# Patient Record
Sex: MALE | Race: WHITE | NOT HISPANIC OR LATINO | Employment: OTHER | URBAN - METROPOLITAN AREA
[De-identification: names, ages, dates, MRNs, and addresses within clinical notes are randomized per-mention and may not be internally consistent; named-entity substitution may affect disease eponyms.]

---

## 2017-10-03 ENCOUNTER — TRANSCRIBE ORDERS (OUTPATIENT)
Dept: CARDIOLOGY | Facility: CLINIC | Age: 67
End: 2017-10-03

## 2017-10-03 DIAGNOSIS — R94.39 ABNORMAL STRESS ECHO: Primary | ICD-10-CM

## 2017-10-05 ENCOUNTER — PREP FOR SURGERY (OUTPATIENT)
Dept: OTHER | Facility: HOSPITAL | Age: 67
End: 2017-10-05

## 2017-10-05 DIAGNOSIS — R94.39 ABNORMAL STRESS TEST: Primary | ICD-10-CM

## 2017-10-05 RX ORDER — ONDANSETRON 2 MG/ML
4 INJECTION INTRAMUSCULAR; INTRAVENOUS EVERY 6 HOURS PRN
Status: CANCELLED | OUTPATIENT
Start: 2017-10-05

## 2017-10-05 RX ORDER — ASPIRIN 325 MG
325 TABLET, DELAYED RELEASE (ENTERIC COATED) ORAL DAILY
Status: CANCELLED | OUTPATIENT
Start: 2017-10-06

## 2017-10-05 RX ORDER — SODIUM CHLORIDE 0.9 % (FLUSH) 0.9 %
1-10 SYRINGE (ML) INJECTION AS NEEDED
Status: CANCELLED | OUTPATIENT
Start: 2017-10-05

## 2017-10-05 RX ORDER — NITROGLYCERIN 0.4 MG/1
0.4 TABLET SUBLINGUAL
Status: CANCELLED | OUTPATIENT
Start: 2017-10-05

## 2017-10-05 RX ORDER — ASPIRIN 325 MG
325 TABLET ORAL ONCE
Status: CANCELLED | OUTPATIENT
Start: 2017-10-05 | End: 2017-10-05

## 2017-10-05 RX ORDER — ACETAMINOPHEN 325 MG/1
650 TABLET ORAL EVERY 4 HOURS PRN
Status: CANCELLED | OUTPATIENT
Start: 2017-10-05

## 2017-10-06 ENCOUNTER — HOSPITAL ENCOUNTER (OUTPATIENT)
Facility: HOSPITAL | Age: 67
Setting detail: HOSPITAL OUTPATIENT SURGERY
Discharge: HOME | End: 2017-10-06
Attending: INTERNAL MEDICINE | Admitting: INTERNAL MEDICINE

## 2017-10-06 VITALS
TEMPERATURE: 98.8 F | WEIGHT: 255.95 LBS | OXYGEN SATURATION: 93 % | HEART RATE: 63 BPM | HEIGHT: 72 IN | SYSTOLIC BLOOD PRESSURE: 95 MMHG | DIASTOLIC BLOOD PRESSURE: 55 MMHG | RESPIRATION RATE: 18 BRPM | BODY MASS INDEX: 34.67 KG/M2

## 2017-10-06 DIAGNOSIS — R94.39 ABNORMAL STRESS TEST: ICD-10-CM

## 2017-10-06 DIAGNOSIS — R94.39 ABNORMAL STRESS ECHO: ICD-10-CM

## 2017-10-06 LAB
ARTICHOKE IGE QN: 74 MG/DL (ref 0–130)
CHOLEST SERPL-MCNC: 144 MG/DL (ref 0–200)
GLUCOSE BLDC GLUCOMTR-MCNC: 184 MG/DL (ref 70–130)
HBA1C MFR BLD: 7.1 % (ref 4.8–5.6)
HDLC SERPL-MCNC: 30 MG/DL (ref 40–60)
INR PPP: 1.06
PROTHROMBIN TIME: 11.6 SECONDS (ref 9.6–11.5)
TRIGL SERPL-MCNC: 252 MG/DL (ref 0–150)

## 2017-10-06 PROCEDURE — 93571 IV DOP VEL&/PRESS C FLO 1ST: CPT | Performed by: INTERNAL MEDICINE

## 2017-10-06 PROCEDURE — 93458 L HRT ARTERY/VENTRICLE ANGIO: CPT | Performed by: INTERNAL MEDICINE

## 2017-10-06 PROCEDURE — 25010000002 FENTANYL CITRATE (PF) 100 MCG/2ML SOLUTION: Performed by: INTERNAL MEDICINE

## 2017-10-06 PROCEDURE — S0260 H&P FOR SURGERY: HCPCS | Performed by: INTERNAL MEDICINE

## 2017-10-06 PROCEDURE — 83036 HEMOGLOBIN GLYCOSYLATED A1C: CPT | Performed by: NURSE PRACTITIONER

## 2017-10-06 PROCEDURE — 80061 LIPID PANEL: CPT | Performed by: NURSE PRACTITIONER

## 2017-10-06 PROCEDURE — C1894 INTRO/SHEATH, NON-LASER: HCPCS | Performed by: INTERNAL MEDICINE

## 2017-10-06 PROCEDURE — 82962 GLUCOSE BLOOD TEST: CPT

## 2017-10-06 PROCEDURE — C1769 GUIDE WIRE: HCPCS | Performed by: INTERNAL MEDICINE

## 2017-10-06 PROCEDURE — C1887 CATHETER, GUIDING: HCPCS | Performed by: INTERNAL MEDICINE

## 2017-10-06 PROCEDURE — 85610 PROTHROMBIN TIME: CPT | Performed by: PHYSICIAN ASSISTANT

## 2017-10-06 PROCEDURE — 0 IOPAMIDOL PER 1 ML: Performed by: INTERNAL MEDICINE

## 2017-10-06 PROCEDURE — 36415 COLL VENOUS BLD VENIPUNCTURE: CPT

## 2017-10-06 PROCEDURE — 25010000002 MIDAZOLAM PER 1 MG: Performed by: INTERNAL MEDICINE

## 2017-10-06 PROCEDURE — 25010000002 HEPARIN (PORCINE) PER 1000 UNITS: Performed by: INTERNAL MEDICINE

## 2017-10-06 PROCEDURE — 25010000002 BIVALIRUDIN 5 MG/ML: Performed by: INTERNAL MEDICINE

## 2017-10-06 RX ORDER — METOPROLOL SUCCINATE 100 MG/1
100 TABLET, EXTENDED RELEASE ORAL DAILY
COMMUNITY
End: 2023-01-05

## 2017-10-06 RX ORDER — LOSARTAN POTASSIUM 50 MG/1
50 TABLET ORAL DAILY
COMMUNITY
End: 2022-10-28

## 2017-10-06 RX ORDER — ASPIRIN 325 MG
325 TABLET, DELAYED RELEASE (ENTERIC COATED) ORAL DAILY
Status: DISCONTINUED | OUTPATIENT
Start: 2017-10-07 | End: 2017-10-06 | Stop reason: HOSPADM

## 2017-10-06 RX ORDER — SODIUM CHLORIDE 9 MG/ML
1-3 INJECTION, SOLUTION INTRAVENOUS CONTINUOUS
Status: DISCONTINUED | OUTPATIENT
Start: 2017-10-06 | End: 2017-10-06 | Stop reason: HOSPADM

## 2017-10-06 RX ORDER — NITROGLYCERIN 0.4 MG/1
0.4 TABLET SUBLINGUAL
Status: DISCONTINUED | OUTPATIENT
Start: 2017-10-06 | End: 2017-10-06 | Stop reason: HOSPADM

## 2017-10-06 RX ORDER — FENTANYL CITRATE 50 UG/ML
INJECTION, SOLUTION INTRAMUSCULAR; INTRAVENOUS AS NEEDED
Status: DISCONTINUED | OUTPATIENT
Start: 2017-10-06 | End: 2017-10-06 | Stop reason: HOSPADM

## 2017-10-06 RX ORDER — PRAVASTATIN SODIUM 80 MG/1
80 TABLET ORAL DAILY
COMMUNITY
Start: 2013-12-25 | End: 2023-01-05

## 2017-10-06 RX ORDER — MAGNESIUM OXIDE 400 MG/1
400 TABLET ORAL DAILY
COMMUNITY

## 2017-10-06 RX ORDER — ASPIRIN 325 MG
325 TABLET ORAL ONCE
Status: COMPLETED | OUTPATIENT
Start: 2017-10-06 | End: 2017-10-06

## 2017-10-06 RX ORDER — HYDROCODONE BITARTRATE AND ACETAMINOPHEN 5; 325 MG/1; MG/1
1 TABLET ORAL EVERY 4 HOURS PRN
Status: CANCELLED | OUTPATIENT
Start: 2017-10-06 | End: 2017-10-16

## 2017-10-06 RX ORDER — CHLORAL HYDRATE 500 MG
2000 CAPSULE ORAL 2 TIMES DAILY WITH MEALS
COMMUNITY
End: 2023-01-05

## 2017-10-06 RX ORDER — MIDAZOLAM HYDROCHLORIDE 1 MG/ML
INJECTION INTRAMUSCULAR; INTRAVENOUS AS NEEDED
Status: DISCONTINUED | OUTPATIENT
Start: 2017-10-06 | End: 2017-10-06 | Stop reason: HOSPADM

## 2017-10-06 RX ORDER — AMLODIPINE BESYLATE 10 MG/1
10 TABLET ORAL DAILY
COMMUNITY
End: 2022-08-23

## 2017-10-06 RX ORDER — ACETAMINOPHEN 325 MG/1
650 TABLET ORAL EVERY 4 HOURS PRN
Status: DISCONTINUED | OUTPATIENT
Start: 2017-10-06 | End: 2017-10-06 | Stop reason: HOSPADM

## 2017-10-06 RX ORDER — ASPIRIN 81 MG/1
81 TABLET ORAL DAILY
COMMUNITY

## 2017-10-06 RX ORDER — PRAVASTATIN SODIUM 40 MG
80 TABLET ORAL NIGHTLY
COMMUNITY
End: 2019-11-12 | Stop reason: SDUPTHER

## 2017-10-06 RX ORDER — ACETAMINOPHEN 325 MG/1
650 TABLET ORAL EVERY 4 HOURS PRN
Status: CANCELLED | OUTPATIENT
Start: 2017-10-06

## 2017-10-06 RX ORDER — SODIUM CHLORIDE 0.9 % (FLUSH) 0.9 %
1-10 SYRINGE (ML) INJECTION AS NEEDED
Status: CANCELLED | OUTPATIENT
Start: 2017-10-06

## 2017-10-06 RX ORDER — LIDOCAINE HYDROCHLORIDE 10 MG/ML
INJECTION, SOLUTION INFILTRATION; PERINEURAL AS NEEDED
Status: DISCONTINUED | OUTPATIENT
Start: 2017-10-06 | End: 2017-10-06 | Stop reason: HOSPADM

## 2017-10-06 RX ORDER — WARFARIN SODIUM 5 MG/1
7.5 TABLET ORAL
COMMUNITY
End: 2022-08-23

## 2017-10-06 RX ORDER — ONDANSETRON 2 MG/ML
4 INJECTION INTRAMUSCULAR; INTRAVENOUS EVERY 6 HOURS PRN
Status: DISCONTINUED | OUTPATIENT
Start: 2017-10-06 | End: 2017-10-06 | Stop reason: HOSPADM

## 2017-10-06 RX ORDER — SODIUM CHLORIDE 0.9 % (FLUSH) 0.9 %
1-10 SYRINGE (ML) INJECTION AS NEEDED
Status: DISCONTINUED | OUTPATIENT
Start: 2017-10-06 | End: 2017-10-06 | Stop reason: HOSPADM

## 2017-10-06 RX ADMIN — ASPIRIN 325 MG ORAL TABLET 325 MG: 325 PILL ORAL at 10:45

## 2017-10-06 RX ADMIN — SODIUM CHLORIDE 3 ML/KG/HR: 9 INJECTION, SOLUTION INTRAVENOUS at 10:43
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
## 2017-10-06 NOTE — PLAN OF CARE
Problem: Patient Care Overview (Adult)  Goal: Plan of Care Review  Outcome: Outcome(s) achieved Date Met:  10/06/17    10/06/17 1350   Coping/Psychosocial Response Interventions   Plan Of Care Reviewed With patient   Patient Care Overview   Progress no change   Outcome Evaluation   Outcome Summary/Follow up Plan Patient's site stable at time of discharge. The patient is being discharged home with family.        Goal: Adult Individualization and Mutuality  Outcome: Outcome(s) achieved Date Met:  10/06/17  Goal: Discharge Needs Assessment  Outcome: Outcome(s) achieved Date Met:  10/06/17

## 2017-10-09 ENCOUNTER — DOCUMENTATION (OUTPATIENT)
Dept: CARDIAC REHAB | Facility: HOSPITAL | Age: 67
End: 2017-10-09

## 2019-11-12 ENCOUNTER — OFFICE VISIT (OUTPATIENT)
Dept: PULMONOLOGY | Facility: CLINIC | Age: 69
End: 2019-11-12

## 2019-11-12 ENCOUNTER — DOCUMENTATION (OUTPATIENT)
Dept: ONCOLOGY | Facility: CLINIC | Age: 69
End: 2019-11-12

## 2019-11-12 VITALS
HEART RATE: 59 BPM | OXYGEN SATURATION: 95 % | DIASTOLIC BLOOD PRESSURE: 62 MMHG | SYSTOLIC BLOOD PRESSURE: 116 MMHG | BODY MASS INDEX: 38.65 KG/M2 | TEMPERATURE: 98 F | WEIGHT: 270 LBS | HEIGHT: 70 IN

## 2019-11-12 DIAGNOSIS — R59.0 MEDIASTINAL ADENOPATHY: Primary | ICD-10-CM

## 2019-11-12 DIAGNOSIS — R91.8 LUNG MASS: Primary | ICD-10-CM

## 2019-11-12 DIAGNOSIS — R91.8 ABNORMAL CT LUNG SCREENING: ICD-10-CM

## 2019-11-12 DIAGNOSIS — T45.515A PROTHROMBIN TIME INCREASED DUE TO COUMADIN: ICD-10-CM

## 2019-11-12 DIAGNOSIS — R79.1 PROTHROMBIN TIME INCREASED DUE TO COUMADIN: ICD-10-CM

## 2019-11-12 DIAGNOSIS — R91.8 LUNG MASS: ICD-10-CM

## 2019-11-12 PROBLEM — R94.39 ABNORMAL STRESS ECHO: Status: RESOLVED | Noted: 2017-10-03 | Resolved: 2019-11-12

## 2019-11-12 PROCEDURE — 99203 OFFICE O/P NEW LOW 30 MIN: CPT | Performed by: INTERNAL MEDICINE

## 2019-11-12 PROCEDURE — 94729 DIFFUSING CAPACITY: CPT | Performed by: INTERNAL MEDICINE

## 2019-11-12 PROCEDURE — 94010 BREATHING CAPACITY TEST: CPT | Performed by: INTERNAL MEDICINE

## 2019-11-12 PROCEDURE — 94726 PLETHYSMOGRAPHY LUNG VOLUMES: CPT | Performed by: INTERNAL MEDICINE

## 2019-11-12 RX ORDER — PHYTONADIONE 5 MG/1
10 TABLET ORAL ONCE
Qty: 2 TABLET | Refills: 0 | Status: SHIPPED | OUTPATIENT
Start: 2019-11-12 | End: 2019-11-12

## 2019-11-12 RX ORDER — GABAPENTIN 300 MG/1
300 CAPSULE ORAL 3 TIMES DAILY PRN
Status: ON HOLD | COMMUNITY
Start: 2019-09-06 | End: 2023-02-27

## 2019-11-12 RX ORDER — PHYTONADIONE 5 MG/1
10 TABLET ORAL ONCE
Status: DISCONTINUED | OUTPATIENT
Start: 2019-11-12 | End: 2023-01-05

## 2019-11-12 RX ORDER — PIOGLITAZONEHYDROCHLORIDE 45 MG/1
45 TABLET ORAL DAILY
Status: ON HOLD | COMMUNITY
Start: 2019-09-21 | End: 2023-02-27

## 2019-11-12 RX ORDER — SILDENAFIL 100 MG/1
100 TABLET, FILM COATED ORAL DAILY PRN
COMMUNITY
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
## 2019-11-12 NOTE — PROGRESS NOTES
Met patient in lung nodule clinic with Dr. Mitchell. He has smoked at least 1ppd since age 13. History of prostate cancer treated with XRT. Patient had abnormal follow-up CT chest with subsequent hypermetabolic PET in right perihilar region along with multiple mediastinal lymph nodes. He denies weight loss, hemoptysis, new or worsening SOA. Scans and PFT's reviewed. Per Dr. Mitchell hold coumadin and vitamin K x1 prior to EBUS for biopsy and staging. Introduced lung navigator role and provided contact information. He v/u and agreeable to plan. He knows to call with questions or concerns.

## 2019-11-13 ENCOUNTER — TRANSCRIBE ORDERS (OUTPATIENT)
Dept: PULMONOLOGY | Facility: CLINIC | Age: 69
End: 2019-11-13

## 2019-11-14 ENCOUNTER — APPOINTMENT (OUTPATIENT)
Dept: PREADMISSION TESTING | Facility: HOSPITAL | Age: 69
End: 2019-11-14

## 2019-11-14 VITALS — WEIGHT: 268.52 LBS | BODY MASS INDEX: 36.37 KG/M2 | HEIGHT: 72 IN

## 2019-11-14 LAB
ALBUMIN SERPL-MCNC: 4.4 G/DL (ref 3.5–5.2)
ALBUMIN/GLOB SERPL: 1.6 G/DL
ALP SERPL-CCNC: 55 U/L (ref 39–117)
ALT SERPL W P-5'-P-CCNC: 18 U/L (ref 1–41)
ANION GAP SERPL CALCULATED.3IONS-SCNC: 13 MMOL/L (ref 5–15)
APTT PPP: 34.6 SECONDS (ref 24–37)
AST SERPL-CCNC: 18 U/L (ref 1–40)
BILIRUB SERPL-MCNC: 0.7 MG/DL (ref 0.2–1.2)
BUN BLD-MCNC: 12 MG/DL (ref 8–23)
BUN/CREAT SERPL: 15.4 (ref 7–25)
CALCIUM SPEC-SCNC: 9.5 MG/DL (ref 8.6–10.5)
CHLORIDE SERPL-SCNC: 102 MMOL/L (ref 98–107)
CO2 SERPL-SCNC: 27 MMOL/L (ref 22–29)
CREAT BLD-MCNC: 0.78 MG/DL (ref 0.76–1.27)
DEPRECATED RDW RBC AUTO: 56 FL (ref 37–54)
ERYTHROCYTE [DISTWIDTH] IN BLOOD BY AUTOMATED COUNT: 14.5 % (ref 12.3–15.4)
GFR SERPL CREATININE-BSD FRML MDRD: 99 ML/MIN/1.73
GLOBULIN UR ELPH-MCNC: 2.7 GM/DL
GLUCOSE BLD-MCNC: 118 MG/DL (ref 65–99)
HCT VFR BLD AUTO: 45.4 % (ref 37.5–51)
HGB BLD-MCNC: 15 G/DL (ref 13–17.7)
INR PPP: 1.84 (ref 0.85–1.16)
MCH RBC QN AUTO: 34.2 PG (ref 26.6–33)
MCHC RBC AUTO-ENTMCNC: 33 G/DL (ref 31.5–35.7)
MCV RBC AUTO: 103.7 FL (ref 79–97)
PLATELET # BLD AUTO: 153 10*3/MM3 (ref 140–450)
PMV BLD AUTO: 10.3 FL (ref 6–12)
POTASSIUM BLD-SCNC: 4.4 MMOL/L (ref 3.5–5.2)
PROT SERPL-MCNC: 7.1 G/DL (ref 6–8.5)
PROTHROMBIN TIME: 20.4 SECONDS (ref 11.2–14.3)
RBC # BLD AUTO: 4.38 10*6/MM3 (ref 4.14–5.8)
SODIUM BLD-SCNC: 142 MMOL/L (ref 136–145)
WBC NRBC COR # BLD: 5.87 10*3/MM3 (ref 3.4–10.8)

## 2019-11-14 PROCEDURE — 85730 THROMBOPLASTIN TIME PARTIAL: CPT | Performed by: INTERNAL MEDICINE

## 2019-11-14 PROCEDURE — 85610 PROTHROMBIN TIME: CPT | Performed by: INTERNAL MEDICINE

## 2019-11-14 PROCEDURE — 85027 COMPLETE CBC AUTOMATED: CPT | Performed by: INTERNAL MEDICINE

## 2019-11-14 PROCEDURE — 93005 ELECTROCARDIOGRAM TRACING: CPT

## 2019-11-14 PROCEDURE — 36415 COLL VENOUS BLD VENIPUNCTURE: CPT

## 2019-11-14 PROCEDURE — 80053 COMPREHEN METABOLIC PANEL: CPT | Performed by: INTERNAL MEDICINE

## 2019-11-14 PROCEDURE — 93010 ELECTROCARDIOGRAM REPORT: CPT | Performed by: INTERNAL MEDICINE

## 2019-11-14 NOTE — DISCHARGE INSTRUCTIONS
The following information and instructions were given:    Do not eat, drink, smoke or chew gum after midnight the night before surgery. This also includes no mints.  Take all routine, prescribed medications including heart and blood pressure medicines with a sip of water unless otherwise instructed by your physician.   Do NOT take diabetic medication unless instructed by your physician.    If you were instructed to drink 20 ounces (or until full) of Gatorade the morning of surgery, the Gatorade must be completed 1 hour before arrival time on the day of surgery .  No RED Gatorade.      DO NOT shave for two days before your procedure.  Do not wear makeup.      DO NOT wear fingernail polish (gel/regular) and/or acrylic/artificial nails on the day of surgery.   If you have had a recent manicure and would rather not remove polish or artificial nails, then the minimum requirement is that the polish/artificial nails must be removed from the middle finger on each hand.      If you are having surgery/procedure on an upper extremity, then fingernail polish/artificial fingernails must be removed for surgery.  NO EXCEPTIONS.      If you are having surgery/procedure on a lower extremity, then toenail polish on both extremities must be removed for surgery.  NO EXCEPTIONS.    Remove all jewelry (advise to go to jeweler if unable to remove).  Jewelry, especially rings, can no longer be taped for surgery.    Leave anything you consider valuable at home.    Leave your suitcase in the car until after your surgery.    Bring the following with you the day of your procedure (when applicable)       -picture ID and insurance cards       -Co-pay/deductible required by insurance       -Medications in the original bottles (not a list) including all over-the-counter medications if not brought to PAT       -Copy of advance directive, living will or power of  documents if not brought to PAT       -CPAP or BIPAP mask and tubing (do not  bring machine)       -Skin prep instruction(s) sheet       -PAT Pass    Education booklet, brochure, handout or binder related to procedure given to patient.    When applicable, an ERAS booklet was given to patient.    Pain Control After Surgery handout given to patient.    Respirex use (handout given to patient) and pneumonia prevention education provided.    Signs and Symptoms of infection discussed.    DVT Prevention education given.  Stressing the importance of ambulation.    Please apply Chlorhexadine wipes to surgical area (if instructed) the night before procedure and the AM of procedure and document date/time of applications on skin prep instruction sheet.        '

## 2019-11-20 ENCOUNTER — HOSPITAL ENCOUNTER (OUTPATIENT)
Facility: HOSPITAL | Age: 69
Setting detail: HOSPITAL OUTPATIENT SURGERY
Discharge: HOME | End: 2019-11-20
Attending: INTERNAL MEDICINE | Admitting: INTERNAL MEDICINE

## 2019-11-20 ENCOUNTER — ANESTHESIA EVENT (OUTPATIENT)
Dept: GASTROENTEROLOGY | Facility: HOSPITAL | Age: 69
End: 2019-11-20

## 2019-11-20 ENCOUNTER — ANESTHESIA (OUTPATIENT)
Dept: GASTROENTEROLOGY | Facility: HOSPITAL | Age: 69
End: 2019-11-20

## 2019-11-20 VITALS
HEART RATE: 73 BPM | HEIGHT: 72 IN | RESPIRATION RATE: 18 BRPM | BODY MASS INDEX: 36.57 KG/M2 | DIASTOLIC BLOOD PRESSURE: 63 MMHG | WEIGHT: 270 LBS | OXYGEN SATURATION: 92 % | TEMPERATURE: 98.6 F | SYSTOLIC BLOOD PRESSURE: 100 MMHG

## 2019-11-20 DIAGNOSIS — R91.8 LUNG MASS: ICD-10-CM

## 2019-11-20 LAB
GLUCOSE BLDC GLUCOMTR-MCNC: 107 MG/DL (ref 70–130)
GLUCOSE BLDC GLUCOMTR-MCNC: 99 MG/DL (ref 70–130)

## 2019-11-20 PROCEDURE — 88305 TISSUE EXAM BY PATHOLOGIST: CPT | Performed by: INTERNAL MEDICINE

## 2019-11-20 PROCEDURE — 88341 IMHCHEM/IMCYTCHM EA ADD ANTB: CPT | Performed by: INTERNAL MEDICINE

## 2019-11-20 PROCEDURE — 31652 BRONCH EBUS SAMPLNG 1/2 NODE: CPT | Performed by: INTERNAL MEDICINE

## 2019-11-20 PROCEDURE — 87206 SMEAR FLUORESCENT/ACID STAI: CPT | Performed by: INTERNAL MEDICINE

## 2019-11-20 PROCEDURE — 87186 SC STD MICRODIL/AGAR DIL: CPT | Performed by: INTERNAL MEDICINE

## 2019-11-20 PROCEDURE — 25010000002 PROPOFOL 10 MG/ML EMULSION: Performed by: NURSE ANESTHETIST, CERTIFIED REGISTERED

## 2019-11-20 PROCEDURE — 25010000002 NEOSTIGMINE 10 MG/10ML SOLUTION: Performed by: NURSE ANESTHETIST, CERTIFIED REGISTERED

## 2019-11-20 PROCEDURE — 88112 CYTOPATH CELL ENHANCE TECH: CPT | Performed by: INTERNAL MEDICINE

## 2019-11-20 PROCEDURE — 25010000002 ONDANSETRON PER 1 MG: Performed by: NURSE ANESTHETIST, CERTIFIED REGISTERED

## 2019-11-20 PROCEDURE — 87205 SMEAR GRAM STAIN: CPT | Performed by: INTERNAL MEDICINE

## 2019-11-20 PROCEDURE — 25010000002 FENTANYL CITRATE (PF) 100 MCG/2ML SOLUTION: Performed by: NURSE ANESTHETIST, CERTIFIED REGISTERED

## 2019-11-20 PROCEDURE — C1726 CATH, BAL DIL, NON-VASCULAR: HCPCS | Performed by: INTERNAL MEDICINE

## 2019-11-20 PROCEDURE — 87077 CULTURE AEROBIC IDENTIFY: CPT | Performed by: INTERNAL MEDICINE

## 2019-11-20 PROCEDURE — 87102 FUNGUS ISOLATION CULTURE: CPT | Performed by: INTERNAL MEDICINE

## 2019-11-20 PROCEDURE — 87116 MYCOBACTERIA CULTURE: CPT | Performed by: INTERNAL MEDICINE

## 2019-11-20 PROCEDURE — 88342 IMHCHEM/IMCYTCHM 1ST ANTB: CPT | Performed by: INTERNAL MEDICINE

## 2019-11-20 PROCEDURE — 82962 GLUCOSE BLOOD TEST: CPT

## 2019-11-20 PROCEDURE — 87070 CULTURE OTHR SPECIMN AEROBIC: CPT | Performed by: INTERNAL MEDICINE

## 2019-11-20 RX ORDER — FAMOTIDINE 10 MG/ML
20 INJECTION, SOLUTION INTRAVENOUS ONCE
Status: COMPLETED | OUTPATIENT
Start: 2019-11-20 | End: 2019-11-20

## 2019-11-20 RX ORDER — ONDANSETRON 2 MG/ML
4 INJECTION INTRAMUSCULAR; INTRAVENOUS ONCE AS NEEDED
Status: DISCONTINUED | OUTPATIENT
Start: 2019-11-20 | End: 2019-11-20 | Stop reason: HOSPADM

## 2019-11-20 RX ORDER — SODIUM CHLORIDE, SODIUM LACTATE, POTASSIUM CHLORIDE, CALCIUM CHLORIDE 600; 310; 30; 20 MG/100ML; MG/100ML; MG/100ML; MG/100ML
9 INJECTION, SOLUTION INTRAVENOUS CONTINUOUS
Status: DISCONTINUED | OUTPATIENT
Start: 2019-11-20 | End: 2019-11-20 | Stop reason: HOSPADM

## 2019-11-20 RX ORDER — SODIUM CHLORIDE 0.9 % (FLUSH) 0.9 %
3-10 SYRINGE (ML) INJECTION AS NEEDED
Status: DISCONTINUED | OUTPATIENT
Start: 2019-11-20 | End: 2019-11-20 | Stop reason: HOSPADM

## 2019-11-20 RX ORDER — IPRATROPIUM BROMIDE AND ALBUTEROL SULFATE 2.5; .5 MG/3ML; MG/3ML
3 SOLUTION RESPIRATORY (INHALATION) ONCE AS NEEDED
Status: DISCONTINUED | OUTPATIENT
Start: 2019-11-20 | End: 2019-11-20 | Stop reason: HOSPADM

## 2019-11-20 RX ORDER — HYDRALAZINE HYDROCHLORIDE 20 MG/ML
5 INJECTION INTRAMUSCULAR; INTRAVENOUS
Status: DISCONTINUED | OUTPATIENT
Start: 2019-11-20 | End: 2019-11-20 | Stop reason: HOSPADM

## 2019-11-20 RX ORDER — LIDOCAINE HYDROCHLORIDE 10 MG/ML
INJECTION, SOLUTION EPIDURAL; INFILTRATION; INTRACAUDAL; PERINEURAL AS NEEDED
Status: DISCONTINUED | OUTPATIENT
Start: 2019-11-20 | End: 2019-11-20 | Stop reason: SURG

## 2019-11-20 RX ORDER — NALOXONE HCL 0.4 MG/ML
0.4 VIAL (ML) INJECTION AS NEEDED
Status: DISCONTINUED | OUTPATIENT
Start: 2019-11-20 | End: 2019-11-20 | Stop reason: HOSPADM

## 2019-11-20 RX ORDER — NEOSTIGMINE METHYLSULFATE 1 MG/ML
INJECTION, SOLUTION INTRAVENOUS AS NEEDED
Status: DISCONTINUED | OUTPATIENT
Start: 2019-11-20 | End: 2019-11-20 | Stop reason: SURG

## 2019-11-20 RX ORDER — SODIUM CHLORIDE 0.9 % (FLUSH) 0.9 %
3 SYRINGE (ML) INJECTION EVERY 12 HOURS SCHEDULED
Status: DISCONTINUED | OUTPATIENT
Start: 2019-11-20 | End: 2019-11-20 | Stop reason: HOSPADM

## 2019-11-20 RX ORDER — ATORVASTATIN CALCIUM 80 MG/1
80 TABLET, FILM COATED ORAL DAILY
COMMUNITY

## 2019-11-20 RX ORDER — PROMETHAZINE HYDROCHLORIDE 25 MG/1
25 SUPPOSITORY RECTAL ONCE AS NEEDED
Status: DISCONTINUED | OUTPATIENT
Start: 2019-11-20 | End: 2019-11-20 | Stop reason: HOSPADM

## 2019-11-20 RX ORDER — FAMOTIDINE 20 MG/1
20 TABLET, FILM COATED ORAL ONCE
Status: DISCONTINUED | OUTPATIENT
Start: 2019-11-20 | End: 2019-11-20

## 2019-11-20 RX ORDER — PROMETHAZINE HYDROCHLORIDE 25 MG/1
25 TABLET ORAL ONCE AS NEEDED
Status: DISCONTINUED | OUTPATIENT
Start: 2019-11-20 | End: 2019-11-20 | Stop reason: HOSPADM

## 2019-11-20 RX ORDER — ONDANSETRON 2 MG/ML
INJECTION INTRAMUSCULAR; INTRAVENOUS AS NEEDED
Status: DISCONTINUED | OUTPATIENT
Start: 2019-11-20 | End: 2019-11-20 | Stop reason: SURG

## 2019-11-20 RX ORDER — GLYCOPYRROLATE 0.2 MG/ML
INJECTION INTRAMUSCULAR; INTRAVENOUS AS NEEDED
Status: DISCONTINUED | OUTPATIENT
Start: 2019-11-20 | End: 2019-11-20 | Stop reason: SURG

## 2019-11-20 RX ORDER — FENTANYL CITRATE 50 UG/ML
INJECTION, SOLUTION INTRAMUSCULAR; INTRAVENOUS AS NEEDED
Status: DISCONTINUED | OUTPATIENT
Start: 2019-11-20 | End: 2019-11-20 | Stop reason: SURG

## 2019-11-20 RX ORDER — HYDROCODONE BITARTRATE AND ACETAMINOPHEN 5; 325 MG/1; MG/1
1 TABLET ORAL ONCE AS NEEDED
Status: DISCONTINUED | OUTPATIENT
Start: 2019-11-20 | End: 2019-11-20 | Stop reason: HOSPADM

## 2019-11-20 RX ORDER — ROCURONIUM BROMIDE 10 MG/ML
INJECTION, SOLUTION INTRAVENOUS AS NEEDED
Status: DISCONTINUED | OUTPATIENT
Start: 2019-11-20 | End: 2019-11-20 | Stop reason: SURG

## 2019-11-20 RX ORDER — PROMETHAZINE HYDROCHLORIDE 25 MG/ML
6.25 INJECTION, SOLUTION INTRAMUSCULAR; INTRAVENOUS ONCE AS NEEDED
Status: DISCONTINUED | OUTPATIENT
Start: 2019-11-20 | End: 2019-11-20 | Stop reason: HOSPADM

## 2019-11-20 RX ORDER — MEPERIDINE HYDROCHLORIDE 50 MG/ML
12.5 INJECTION INTRAMUSCULAR; INTRAVENOUS; SUBCUTANEOUS
Status: DISCONTINUED | OUTPATIENT
Start: 2019-11-20 | End: 2019-11-20 | Stop reason: HOSPADM

## 2019-11-20 RX ORDER — PROPOFOL 10 MG/ML
VIAL (ML) INTRAVENOUS AS NEEDED
Status: DISCONTINUED | OUTPATIENT
Start: 2019-11-20 | End: 2019-11-20 | Stop reason: SURG

## 2019-11-20 RX ORDER — LABETALOL HYDROCHLORIDE 5 MG/ML
5 INJECTION, SOLUTION INTRAVENOUS
Status: DISCONTINUED | OUTPATIENT
Start: 2019-11-20 | End: 2019-11-20 | Stop reason: HOSPADM

## 2019-11-20 RX ORDER — FENTANYL CITRATE 50 UG/ML
50 INJECTION, SOLUTION INTRAMUSCULAR; INTRAVENOUS
Status: DISCONTINUED | OUTPATIENT
Start: 2019-11-20 | End: 2019-11-20 | Stop reason: HOSPADM

## 2019-11-20 RX ORDER — LIDOCAINE HYDROCHLORIDE 10 MG/ML
0.5 INJECTION, SOLUTION EPIDURAL; INFILTRATION; INTRACAUDAL; PERINEURAL ONCE AS NEEDED
Status: DISCONTINUED | OUTPATIENT
Start: 2019-11-20 | End: 2019-11-20

## 2019-11-20 RX ADMIN — PROPOFOL 75 MCG/KG/MIN: 10 INJECTION, EMULSION INTRAVENOUS at 10:26

## 2019-11-20 RX ADMIN — ROCURONIUM BROMIDE 45 MG: 10 INJECTION INTRAVENOUS at 10:18

## 2019-11-20 RX ADMIN — FENTANYL CITRATE 50 MCG: 50 INJECTION, SOLUTION INTRAMUSCULAR; INTRAVENOUS at 10:36

## 2019-11-20 RX ADMIN — ONDANSETRON 4 MG: 2 INJECTION INTRAMUSCULAR; INTRAVENOUS at 10:44

## 2019-11-20 RX ADMIN — GLYCOPYRROLATE 0.4 MG: 0.2 INJECTION, SOLUTION INTRAMUSCULAR; INTRAVENOUS at 10:47

## 2019-11-20 RX ADMIN — PROPOFOL 200 MG: 10 INJECTION, EMULSION INTRAVENOUS at 10:18

## 2019-11-20 RX ADMIN — SODIUM CHLORIDE, POTASSIUM CHLORIDE, SODIUM LACTATE AND CALCIUM CHLORIDE 9 ML/HR: 600; 310; 30; 20 INJECTION, SOLUTION INTRAVENOUS at 10:00

## 2019-11-20 RX ADMIN — NEOSTIGMINE METHYLSULFATE 3 MG: 1 INJECTION, SOLUTION INTRAVENOUS at 10:47

## 2019-11-20 RX ADMIN — FAMOTIDINE 20 MG: 10 INJECTION INTRAVENOUS at 10:04

## 2019-11-20 RX ADMIN — LIDOCAINE HYDROCHLORIDE 50 MG: 10 INJECTION, SOLUTION EPIDURAL; INFILTRATION; INTRACAUDAL; PERINEURAL at 10:18

## 2019-11-20 RX ADMIN — FENTANYL CITRATE 50 MCG: 50 INJECTION, SOLUTION INTRAMUSCULAR; INTRAVENOUS at 10:18

## 2019-11-20 NOTE — INTERVAL H&P NOTE
I have discussed the case with Dr. Mitchell and I feel that the most appropriate action would be to proceed with bronchoscopy for airway examination as well as interbronchial ultrasound to survey there is right hilar mass which I believe represents extension into station 11 R and is probably originating either from that area or from the right lower lobe proximally.  In addition, there is PET avidity and station 4R and 7 and I will surveyed these as well in sample if possible.    There is been no change in the patient's state of health since the examination by partner last week.    I have explained the risks, benefits, and alternatives to the procedure and the patient has agreed to proceed.

## 2019-11-20 NOTE — ANESTHESIA PROCEDURE NOTES
Airway  Urgency: elective    Date/Time: 11/20/2019 10:20 AM  Airway not difficult    General Information and Staff    Patient location during procedure: OR  CRNA: Jamel Barraza CRNA    Indications and Patient Condition  Indications for airway management: airway protection    Preoxygenated: yes  MILS not maintained throughout  Mask difficulty assessment: 2 - vent by mask + OA or adjuvant +/- NMBA    Final Airway Details  Final airway type: endotracheal airway      Successful airway: ETT  Cuffed: yes   Successful intubation technique: direct laryngoscopy  Endotracheal tube insertion site: oral  Blade: Zaki  Blade size: 4  ETT size (mm): 9.0  Cormack-Lehane Classification: grade I - full view of glottis  Placement verified by: chest auscultation and capnometry   Cuff volume (mL): 7  Measured from: lips  ETT/EBT  to lips (cm): 22  Number of attempts at approach: 1  Assessment: lips, teeth, and gum same as pre-op and atraumatic intubation    Additional Comments  Negative epigastric sounds, Breath sound equal bilaterally with symmetric chest rise and fall

## 2019-11-20 NOTE — ANESTHESIA POSTPROCEDURE EVALUATION
Patient: Stephane Noe    Procedure Summary     Date:  11/20/19 Room / Location:   [facility]    Anesthesia Start:  1013 Anesthesia Stop:  1111    Procedure:  BRONCHOSCOPY WITH EBUS (N/A Bronchus) Diagnosis:      Surgeon:  Keshawn Morejon MD Provider:  Brian Aaron MD    Anesthesia Type:  general ASA Status:  3          Anesthesia Type: general  Last vitals  BP   112/81   Temp 98.2   Pulse 70   Resp 20   SpO2 89%     Post Anesthesia Care and Evaluation    Patient location during evaluation: PACU  Patient participation: complete - patient participated  Level of consciousness: awake  Pain score: 0  Pain management: adequate  Airway patency: patent  Anesthetic complications: No anesthetic complications  PONV Status: none  Cardiovascular status: hemodynamically stable and acceptable  Respiratory status: nonlabored ventilation, acceptable and nasal cannula  Hydration status: acceptable

## 2019-11-20 NOTE — OP NOTE
Bronchoscopy Procedural Note       Date of Operation: 11/20/2019    Indication: This is a 68 y.o. found on routine screening CT to have adenopathy as well as a right hilar mass.  This was followed by a PET scan which showed PET avidity in the mediastinum as well as a right hilum.  He presents today for diagnostic bronchoscopy.    Pre-op Diagnosis: Right hilar lung mass with PET avid adenopathy    Post-op Diagnosis: Same    Surgeon: Keshawn Morejon MD    Referring Physician: Toby Mitchell MD    Procedures Performed:  Flexible fiberoptic bronchoscopy  Bronchial washings  Endobronchial ultrasound with transbronchial needle aspiration of station 7 and station 11 R lung mass    Anesthesia: General    Estimated Blood Loss: <5 ml    Description of Procedure:  After obtaining informed consent and completing a procedural pause, the flexible fiberoptic bronchoscope was passed through the pre-existing endotracheal tube.  The endotracheal tube is well positioned proximately 3 cm above the irving.  The irving is sharp.  The visualized portions of the trachea appear normal.  I first proceeded to the left side and surveyed the left upper lobe, the lingula, and the left lower lobe.  These are seem to be structurally normal with no sign of endobronchial mucosal abnormality or retained secretions.  I then proceeded to the right side and surveyed the right upper lobe, the right middle lobe, and the right lower lobe to the level of the subsegments.  These are similarly seen to be free of interbronchial lesions or secretions.  Of note, there is a bit of what appears to be some extrinsic compression of the superior segment of the right lower lobe although this may just be an anatomic variance.  No endobronchial lesions or neoplasia was seen.    I then switched to the endobronchial ultrasound scope and completed a survey of the bilateral darleen and mediastinum.  I was able to identify 2 separate targets including the approximately  1 cm station 7 lymph node conglomeration which is well-circumscribed as well as approximately a 2.5 cm right hilar soft tissue density.  Both of these correspond to PET avid lesions on the PET scan.  In that order, I took multiple transbronchial needle aspirations from those stations with good return of material.  Hemostasis was good and estimated blood loss was less than 5 mL.  No other acceptable targets were identified.  The airways were suctioned clean and the scope was removed.  The patient was turned over to anesthesia for postoperative management.  He was extubated without difficulty.      Findings and Recommendations:  We will follow-up on microbiological as well as pathological studies.    I was able to discuss this with the patient post procedure as well as his ex-wife who accompanied him today.    Our office will plan to touch base with him via telephone when some results are available within the next 24 to 48 hours if possible.    He will be able to resume all of his medicines except for warfarin which she will resume tomorrow.      [facility]eshawn Morejon MD, [facility]  Pulmonary and Critical Care Medicine   11/20/19 11:55 AM

## 2019-11-20 NOTE — ANESTHESIA PREPROCEDURE EVALUATION
Anesthesia Evaluation                  Airway   Mallampati: I  TM distance: >3 FB  Neck ROM: full  No difficulty expected  Dental      Pulmonary    Cardiovascular     (+) hypertension, CAD, dysrhythmias Atrial Fib, hyperlipidemia,       Neuro/Psych  GI/Hepatic/Renal/Endo    (+)   diabetes mellitus,     Musculoskeletal     Abdominal    Substance History      OB/GYN          Other                        Anesthesia Plan    ASA 3     general     intravenous induction     Anesthetic plan, all risks, benefits, and alternatives have been provided, discussed and informed consent has been obtained with: patient.    Plan discussed with CRNA.

## 2019-11-22 LAB
BACTERIA SPEC RESP CULT: ABNORMAL
BACTERIA SPEC RESP CULT: ABNORMAL
GRAM STN SPEC: ABNORMAL

## 2019-11-23 LAB
GIE STN SPEC: NORMAL
GIE STN SPEC: NORMAL

## 2019-11-24 LAB
CYTO UR: NORMAL
LAB AP CASE REPORT: NORMAL
LAB AP CLINICAL INFORMATION: NORMAL
LAB AP SPECIAL STAINS: NORMAL
PATH REPORT.FINAL DX SPEC: NORMAL
PATH REPORT.GROSS SPEC: NORMAL

## 2019-11-25 ENCOUNTER — DOCUMENTATION (OUTPATIENT)
Dept: PULMONOLOGY | Facility: CLINIC | Age: 69
End: 2019-11-25

## 2019-11-25 LAB
LAB AP CASE REPORT: NORMAL
PATH REPORT.FINAL DX SPEC: NORMAL

## 2019-11-25 NOTE — PROGRESS NOTES
I was able to contact Mr. Noe via telephone today at 1:40 PM.  I was able to give him the results from the biopsy of station 7 as well as the R mass which I performed last week.  This has been found positive for adenocarcinoma likely of lung primary.  He has already had his PET scan completed and I believe that he will need an MRI of the brain.  I will help facilitate scheduling for radiation and medical oncology appointments.  I was able to answer his questions to the best my ability.    Keshawn Morejon MD

## 2019-11-26 DIAGNOSIS — C78.01 SECONDARY ADENOCARCINOMA OF RIGHT LUNG (HCC): ICD-10-CM

## 2019-11-26 DIAGNOSIS — C34.91 MALIGNANT NEOPLASM OF RIGHT LUNG, UNSPECIFIED PART OF LUNG (HCC): Primary | ICD-10-CM

## 2019-11-26 DIAGNOSIS — C34.91 ADENOCARCINOMA OF RIGHT LUNG (HCC): Primary | ICD-10-CM

## 2019-12-09 ENCOUNTER — OFFICE VISIT (OUTPATIENT)
Dept: PULMONOLOGY | Facility: CLINIC | Age: 69
End: 2019-12-09

## 2019-12-09 VITALS
TEMPERATURE: 97.9 F | OXYGEN SATURATION: 92 % | SYSTOLIC BLOOD PRESSURE: 132 MMHG | HEIGHT: 72 IN | DIASTOLIC BLOOD PRESSURE: 78 MMHG | HEART RATE: 89 BPM | BODY MASS INDEX: 36.52 KG/M2 | WEIGHT: 269.6 LBS

## 2019-12-09 DIAGNOSIS — C34.91 PRIMARY LUNG CANCER, RIGHT (HCC): Primary | ICD-10-CM

## 2019-12-09 PROCEDURE — 99213 OFFICE O/P EST LOW 20 MIN: CPT | Performed by: INTERNAL MEDICINE
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
## 2019-12-11 ENCOUNTER — HOSPITAL ENCOUNTER (OUTPATIENT)
Dept: RADIATION ONCOLOGY | Facility: HOSPITAL | Age: 69
Setting detail: RADIATION/ONCOLOGY SERIES
Discharge: HOME | End: 2019-12-11

## 2019-12-12 ENCOUNTER — HOSPITAL ENCOUNTER (OUTPATIENT)
Dept: GENERAL RADIOLOGY | Facility: HOSPITAL | Age: 69
Discharge: HOME | End: 2019-12-12

## 2019-12-12 ENCOUNTER — HOSPITAL ENCOUNTER (OUTPATIENT)
Dept: MRI IMAGING | Facility: HOSPITAL | Age: 69
Discharge: HOME | End: 2019-12-12
Admitting: NURSE PRACTITIONER

## 2019-12-12 DIAGNOSIS — Z13.89 ENCOUNTER FOR IMAGING TO SCREEN FOR METAL PRIOR TO MRI: ICD-10-CM

## 2019-12-12 DIAGNOSIS — C34.91 ADENOCARCINOMA OF RIGHT LUNG (HCC): ICD-10-CM

## 2019-12-12 PROCEDURE — 70553 MRI BRAIN STEM W/O & W/DYE: CPT

## 2019-12-12 PROCEDURE — 0 GADOBENATE DIMEGLUMINE 529 MG/ML SOLUTION: Performed by: NURSE PRACTITIONER

## 2019-12-12 PROCEDURE — A9577 INJ MULTIHANCE: HCPCS | Performed by: NURSE PRACTITIONER

## 2019-12-12 PROCEDURE — 74018 RADEX ABDOMEN 1 VIEW: CPT

## 2019-12-12 RX ADMIN — GADOBENATE DIMEGLUMINE 20 ML: 529 INJECTION, SOLUTION INTRAVENOUS at 16:36

## 2020-01-01 LAB
FUNGUS WND CULT: NORMAL
MYCOBACTERIUM SPEC CULT: NORMAL
NIGHT BLUE STAIN TISS: NORMAL

## 2020-07-01 ENCOUNTER — OFFICE VISIT (OUTPATIENT)
Dept: PULMONOLOGY | Facility: CLINIC | Age: 70
End: 2020-07-01

## 2020-07-01 VITALS
OXYGEN SATURATION: 95 % | DIASTOLIC BLOOD PRESSURE: 80 MMHG | HEIGHT: 72 IN | BODY MASS INDEX: 31.86 KG/M2 | HEART RATE: 86 BPM | SYSTOLIC BLOOD PRESSURE: 120 MMHG | WEIGHT: 235.2 LBS | TEMPERATURE: 97.8 F

## 2020-07-01 DIAGNOSIS — R91.8 LUNG MASS: Primary | ICD-10-CM

## 2020-07-01 DIAGNOSIS — C34.91 PRIMARY LUNG CANCER, RIGHT (HCC): ICD-10-CM

## 2020-07-01 PROCEDURE — 99213 OFFICE O/P EST LOW 20 MIN: CPT | Performed by: INTERNAL MEDICINE
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
## 2020-12-08 ENCOUNTER — OFFICE VISIT (OUTPATIENT)
Dept: PULMONOLOGY | Facility: CLINIC | Age: 70
End: 2020-12-08

## 2020-12-08 VITALS
OXYGEN SATURATION: 92 % | HEIGHT: 72 IN | WEIGHT: 243 LBS | DIASTOLIC BLOOD PRESSURE: 86 MMHG | SYSTOLIC BLOOD PRESSURE: 130 MMHG | TEMPERATURE: 97.8 F | BODY MASS INDEX: 32.91 KG/M2 | HEART RATE: 88 BPM

## 2020-12-08 DIAGNOSIS — C34.91 PRIMARY LUNG CANCER, RIGHT (HCC): Primary | ICD-10-CM

## 2020-12-08 DIAGNOSIS — F17.210 CIGARETTE NICOTINE DEPENDENCE WITHOUT COMPLICATION: ICD-10-CM

## 2020-12-08 PROCEDURE — 99213 OFFICE O/P EST LOW 20 MIN: CPT | Performed by: NURSE PRACTITIONER

## 2020-12-08 NOTE — PROGRESS NOTES
[facility] Pulmonary Follow up    CHIEF COMPLAINT    History of Lung Cancer    HISTORY OF PRESENT ILLNESS    Stephane Noe is a 70 y.o.male here today for follow-up of a right hilar mass with adenopathy on the screening CT scan.  His biopsy was positive for adenocarcinoma.  He completed XRT, Taxol and carboplatin x6 completed in February 2020.  He then started durvalumab in May and has 4 more treatments left.  His next scan is later this month.    He was last seen by Dr. Mitchell in July.  He denies any respiratory illnesses or exacerbations since his last appointment.    He continues to smoke 2 to 3 cigarettes/day.  He does not have a stop date planned currently.  He has a 55-pack-year history.    He denies fever, chills, sputum production, hemoptysis, night sweats, weight loss, chest pain or palpitations.  Denies any lower extremity edema or calf tenderness.  He denies any sinus or allergy symptoms.  He denies reflux symptoms.    He received his influenza vaccination in October per his PCP.    Patient Active Problem List   Diagnosis   • Lung mass, right lower lobe vs right hilum, PET avid   • Mediastinal adenopathy, PET avidity station 4R, &, 11R/mass   • Primary lung cancer, right (CMS/HCC)   • Cigarette nicotine dependence without complication       Allergies   Allergen Reactions   • No Known Drug Allergy        Current Outpatient Medications:   •  amLODIPine (NORVASC) 10 MG tablet, Take 10 mg by mouth Daily., Disp: , Rfl:   •  aspirin 81 MG EC tablet, Take 81 mg by mouth Daily., Disp: , Rfl:   •  atorvastatin (LIPITOR) 80 MG tablet, Take 80 mg by mouth Daily., Disp: , Rfl:   •  gabapentin (NEURONTIN) 300 MG capsule, Take 300 mg by mouth 3 (Three) Times a Day As Needed., Disp: , Rfl:   •  losartan (COZAAR) 50 MG tablet, Take 50 mg by mouth Daily., Disp: , Rfl:   •  magnesium oxide (MAG-OX) 400 MG tablet, Take 400 mg by mouth Daily., Disp: , Rfl:   •  metFORMIN (GLUCOPHAGE) 500 MG tablet, Take 2,000 mg by  mouth Every Night., Disp: , Rfl:   •  metoprolol succinate XL (TOPROL-XL) 100 MG 24 hr tablet, Take 100 mg by mouth Daily., Disp: , Rfl:   •  Omega-3 Fatty Acids (FISH OIL) 1000 MG capsule capsule, Take 2,000 mg by mouth 2 (Two) Times a Day With Meals., Disp: , Rfl:   •  pioglitazone (ACTOS) 45 MG tablet, Take 45 mg by mouth Daily., Disp: , Rfl:   •  pravastatin (PRAVACHOL) 80 MG tablet, Take 80 mg by mouth Daily., Disp: , Rfl:   •  sildenafil (VIAGRA) 100 MG tablet, Take 100 mg by mouth Daily As Needed for Erectile Dysfunction., Disp: , Rfl:   •  warfarin (COUMADIN) 5 MG tablet, Take 7.5 mg by mouth Daily. Tuesday and Friday 10 mg and every other day 1.5 tablet so 7.5 mg   15 ld, Disp: , Rfl:     Current Facility-Administered Medications:   •  phytonadione (MEPHYTON, VITAMIN K) tablet 10 mg, 10 mg, Oral, Once, Kath Mitchell MD  MEDICATION LIST AND ALLERGIES REVIEWED.    Social History     Tobacco Use   • Smoking status: Current Every Day Smoker     Packs/day: 1.00     Years: 55.00     Pack years: 55.00     Types: Cigarettes   • Smokeless tobacco: Never Used   Substance Use Topics   • Alcohol use: Yes     Alcohol/week: 7.0 standard drinks     Types: 7 Shots of liquor per week     Frequency: Never   • Drug use: No       FAMILY AND SOCIAL HISTORY REVIEWED.    Review of Systems   Constitutional: Negative for activity change, appetite change, fatigue, fever and unexpected weight change.   HENT: Negative for congestion, postnasal drip, rhinorrhea, sinus pressure, sore throat and voice change.    Eyes: Negative for visual disturbance.   Respiratory: Negative for cough, chest tightness, shortness of breath and wheezing.    Cardiovascular: Negative for chest pain, palpitations and leg swelling.   Gastrointestinal: Negative for abdominal distention, abdominal pain, nausea and vomiting.   Endocrine: Negative for cold intolerance and heat intolerance.   Genitourinary: Negative for difficulty urinating and urgency.  "  Musculoskeletal: Negative for arthralgias, back pain and neck pain.   Skin: Negative for color change and pallor.   Allergic/Immunologic: Negative for environmental allergies and food allergies.   Neurological: Negative for dizziness, syncope, weakness and light-headedness.   Hematological: Negative for adenopathy. Does not bruise/bleed easily.   Psychiatric/Behavioral: Negative for agitation and behavioral problems.   .    /86   Pulse 88   Temp 97.8 °F (36.6 °C)   Ht 182.9 cm (72\")   Wt 110 kg (243 lb)   SpO2 92% Comment: resting, room air  BMI 32.96 kg/m²     Immunization History   Administered Date(s) Administered   • Fluzone High Dose =>65 Years ([employer] ONLY) 12/13/2018, 12/17/2019   • Influenza, Unspecified 10/14/2020       Physical Exam  Vitals signs and nursing note reviewed.   Constitutional:       Appearance: He is well-developed. He is not diaphoretic.   HENT:      Head: Normocephalic and atraumatic.   Eyes:      Pupils: Pupils are equal, round, and reactive to light.   Neck:      Musculoskeletal: Normal range of motion and neck supple.      Thyroid: No thyromegaly.   Cardiovascular:      Rate and Rhythm: Normal rate and regular rhythm.      Heart sounds: Normal heart sounds. No murmur. No friction rub. No gallop.    Pulmonary:      Effort: Pulmonary effort is normal. No respiratory distress.      Breath sounds: Normal breath sounds. No wheezing or rales.      Comments: Rhonchi present in upper lobes  Chest:      Chest wall: No tenderness.   Abdominal:      General: Bowel sounds are normal.      Palpations: Abdomen is soft.      Tenderness: There is no abdominal tenderness.   Musculoskeletal: Normal range of motion.         General: No swelling.   Lymphadenopathy:      Cervical: No cervical adenopathy.   Skin:     General: Skin is warm and dry.      Capillary Refill: Capillary refill takes less than 2 seconds.   Neurological:      Mental Status: He is alert and oriented to person, place, and " time.   Psychiatric:         Mood and Affect: Mood normal.         Behavior: Behavior normal.           RESULTS      PROBLEM LIST    Problem List Items Addressed This Visit        Respiratory    Primary lung cancer, right (CMS/HCC) - Primary       Other    Cigarette nicotine dependence without complication            DISCUSSION    Mr. Noe was here for follow-up of his history of lung cancer.  He will finish his treatment after 4 more doses.  He does follow with [facility].  He has a CT scan scheduled for later this month.    He denies any pulmonary complaints in the office today.    We did discuss smoking cessation for 3 minutes and he is not interested in any medication aids at this time.  He does not have a stop date planned currently.    He  would like to follow-up in 1 year with PFTs.  I did advise him that if he were to develop new symptoms he could be seen sooner.  He will call with any additional concerns or questions.  I spent 10-19 minutes with the patient. I spent > 50% percent of this time counseling and discussing diagnosis, prognosis, diagnostic testing, evaluation, current status, treatment options and management.    Olesya Jin, APRN  12/08/202009:18 EST  Electronically signed     Please note that portions of this note were completed with a voice recognition program. Efforts were made to edit the dictations, but occasionally words are mistranscribed.      CC: Nain Morel MD

## 2022-08-23 RX ORDER — WARFARIN SODIUM 5 MG/1
TABLET ORAL
Qty: 163 TABLET | Refills: 3 | Status: SHIPPED | OUTPATIENT
Start: 2022-08-23 | End: 2023-01-05

## 2022-08-23 RX ORDER — AMLODIPINE BESYLATE 10 MG/1
TABLET ORAL
Qty: 90 TABLET | Refills: 3 | Status: SHIPPED | OUTPATIENT
Start: 2022-08-23

## 2022-10-26 RX ORDER — METOPROLOL SUCCINATE 50 MG/1
TABLET, EXTENDED RELEASE ORAL
Qty: 90 TABLET | Refills: 0 | Status: SHIPPED | OUTPATIENT
Start: 2022-10-26

## 2022-10-28 RX ORDER — LOSARTAN POTASSIUM 50 MG/1
TABLET ORAL
Qty: 90 TABLET | Refills: 0 | Status: SHIPPED | OUTPATIENT
Start: 2022-10-28

## 2022-12-09 ENCOUNTER — TELEPHONE (OUTPATIENT)
Dept: FAMILY MEDICINE CLINIC | Facility: CLINIC | Age: 72
End: 2022-12-09

## 2022-12-09 DIAGNOSIS — R42 VERTIGO: Primary | ICD-10-CM

## 2022-12-09 RX ORDER — METFORMIN HYDROCHLORIDE 500 MG/1
TABLET, EXTENDED RELEASE ORAL
COMMUNITY
Start: 2022-10-12

## 2022-12-09 RX ORDER — MECLIZINE HYDROCHLORIDE 25 MG/1
25 TABLET ORAL 3 TIMES DAILY PRN
Status: CANCELLED | OUTPATIENT
Start: 2022-12-09

## 2022-12-09 RX ORDER — MECLIZINE HYDROCHLORIDE 25 MG/1
TABLET ORAL
Qty: 21 TABLET | Refills: 0 | Status: SHIPPED | OUTPATIENT
Start: 2022-12-09

## 2022-12-09 NOTE — TELEPHONE ENCOUNTER
Phone conversation with patient indicates since yesterday he has developed some recurrent vertigo described as some dizziness with spinning sensation but no sense of presyncope.  No chest pains or palpitations.  He has experienced this in the past and had been diagnosed with vertigo.  Requesting meclizine which we will prescribe at 25 mg, 1/2-1 3 times daily as needed.  Advise if symptoms not resolving.  He is scheduled for routine follow-up in 1/2023

## 2022-12-09 NOTE — TELEPHONE ENCOUNTER
Caller: Stephane Noe    Relationship: Self    Best call back number: [phone]     Who are you requesting to speak with (clinical staff, provider,  specific staff member): CLINICAL     What was the call regarding: PATIENT SAID HE IS DIZZY AND CAN'T HARDLY STAND UP   HE IS REQUESTING MECLIZINE       [facility] [place]     Do you require a callback: YES

## 2023-01-05 ENCOUNTER — OFFICE VISIT (OUTPATIENT)
Dept: FAMILY MEDICINE CLINIC | Facility: CLINIC | Age: 73
End: 2023-01-05
Payer: MEDICARE

## 2023-01-05 VITALS
TEMPERATURE: 98.2 F | OXYGEN SATURATION: 96 % | WEIGHT: 210.6 LBS | BODY MASS INDEX: 28.53 KG/M2 | HEART RATE: 83 BPM | HEIGHT: 72 IN | DIASTOLIC BLOOD PRESSURE: 82 MMHG | SYSTOLIC BLOOD PRESSURE: 132 MMHG

## 2023-01-05 DIAGNOSIS — F17.210 CIGARETTE NICOTINE DEPENDENCE WITHOUT COMPLICATION: ICD-10-CM

## 2023-01-05 DIAGNOSIS — I48.21 PERMANENT ATRIAL FIBRILLATION: ICD-10-CM

## 2023-01-05 DIAGNOSIS — Z00.01 ENCOUNTER FOR GENERAL ADULT MEDICAL EXAMINATION WITH ABNORMAL FINDINGS: Primary | ICD-10-CM

## 2023-01-05 DIAGNOSIS — I25.10 ATHEROSCLEROSIS OF NATIVE CORONARY ARTERY OF NATIVE HEART WITHOUT ANGINA PECTORIS: ICD-10-CM

## 2023-01-05 DIAGNOSIS — Z23 NEED FOR VACCINATION: ICD-10-CM

## 2023-01-05 DIAGNOSIS — G89.29 CHRONIC MIDLINE LOW BACK PAIN WITHOUT SCIATICA: ICD-10-CM

## 2023-01-05 DIAGNOSIS — E78.5 DYSLIPIDEMIA: ICD-10-CM

## 2023-01-05 DIAGNOSIS — M70.21 OLECRANON BURSITIS OF RIGHT ELBOW: ICD-10-CM

## 2023-01-05 DIAGNOSIS — E11.42 TYPE 2 DIABETES MELLITUS WITH DIABETIC POLYNEUROPATHY, WITHOUT LONG-TERM CURRENT USE OF INSULIN: ICD-10-CM

## 2023-01-05 DIAGNOSIS — N52.01 ERECTILE DYSFUNCTION DUE TO ARTERIAL INSUFFICIENCY: ICD-10-CM

## 2023-01-05 DIAGNOSIS — G57.12 MERALGIA PARESTHETICA OF LEFT SIDE: ICD-10-CM

## 2023-01-05 DIAGNOSIS — Z11.59 NEED FOR HEPATITIS C SCREENING TEST: ICD-10-CM

## 2023-01-05 DIAGNOSIS — C34.91 PRIMARY LUNG CANCER, RIGHT: ICD-10-CM

## 2023-01-05 DIAGNOSIS — Z95.5 HISTORY OF HEART ARTERY STENT: ICD-10-CM

## 2023-01-05 DIAGNOSIS — Z98.890 HISTORY OF AAA (ABDOMINAL AORTIC ANEURYSM) REPAIR: ICD-10-CM

## 2023-01-05 DIAGNOSIS — J30.1 NON-SEASONAL ALLERGIC RHINITIS DUE TO POLLEN: ICD-10-CM

## 2023-01-05 DIAGNOSIS — K02.9 DENTAL CARIES: ICD-10-CM

## 2023-01-05 DIAGNOSIS — I10 ESSENTIAL HYPERTENSION, BENIGN: ICD-10-CM

## 2023-01-05 DIAGNOSIS — M54.50 CHRONIC MIDLINE LOW BACK PAIN WITHOUT SCIATICA: ICD-10-CM

## 2023-01-05 DIAGNOSIS — C44.509 SKIN CANCER OF ANTERIOR CHEST: ICD-10-CM

## 2023-01-05 DIAGNOSIS — Z85.46 HISTORY OF PROSTATE CANCER: ICD-10-CM

## 2023-01-05 DIAGNOSIS — J44.9 COPD, SEVERE: ICD-10-CM

## 2023-01-05 PROBLEM — C61 PROSTATE CANCER: Status: ACTIVE | Noted: 2023-01-05

## 2023-01-05 PROBLEM — I73.9 PERIPHERAL VASCULAR DISEASE: Status: ACTIVE | Noted: 2023-01-05

## 2023-01-05 PROBLEM — M54.9 CHRONIC BACK PAIN: Status: ACTIVE | Noted: 2023-01-05

## 2023-01-05 PROBLEM — N52.9 ERECTILE DYSFUNCTION: Status: ACTIVE | Noted: 2023-01-05

## 2023-01-05 PROBLEM — C10.9 MALIGNANT NEOPLASM OF OROPHARYNX (HCC): Status: ACTIVE | Noted: 2023-01-05

## 2023-01-05 PROBLEM — I48.91 ATRIAL FIBRILLATION: Status: ACTIVE | Noted: 2023-01-05

## 2023-01-05 PROCEDURE — 93000 ELECTROCARDIOGRAM COMPLETE: CPT | Performed by: INTERNAL MEDICINE

## 2023-01-05 PROCEDURE — 99214 OFFICE O/P EST MOD 30 MIN: CPT | Performed by: INTERNAL MEDICINE

## 2023-01-05 PROCEDURE — G0008 ADMIN INFLUENZA VIRUS VAC: HCPCS | Performed by: INTERNAL MEDICINE

## 2023-01-05 PROCEDURE — 90662 IIV NO PRSV INCREASED AG IM: CPT | Performed by: INTERNAL MEDICINE

## 2023-01-05 PROCEDURE — G0009 ADMIN PNEUMOCOCCAL VACCINE: HCPCS | Performed by: INTERNAL MEDICINE

## 2023-01-05 PROCEDURE — 90677 PCV20 VACCINE IM: CPT | Performed by: INTERNAL MEDICINE

## 2023-01-05 RX ORDER — PREDNISONE 20 MG/1
20 TABLET ORAL DAILY
Qty: 5 TABLET | Refills: 0 | Status: SHIPPED | OUTPATIENT
Start: 2023-01-05 | End: 2023-01-10

## 2023-01-05 NOTE — PROGRESS NOTES
The ABCs of the Annual Wellness Visit  Subsequent Medicare Wellness Visit    Subjective    Stephane Noe is a 72 y.o. male who presents for a Subsequent Medicare Wellness Visit.    The following portions of the patient's history were reviewed and   updated as appropriate: allergies, current medications, past family history, past medical history, past social history, past surgical history and problem list.    Compared to one year ago, the patient feels his physical   health is the same.    Compared to one year ago, the patient feels his mental   health is the same.    Recent Hospitalizations:  He was not admitted to the hospital during the last year.       Current Medical Providers:  Patient Care Team:  Nain Morel MD as PCP - General (Internal Medicine)  Hanna Angel MD as Consulting Physician (Internal Medicine)  Keshawn Morejon MD as Referring Physician (Pulmonary Disease)  Viktoriya Kovacs MD as Consulting Physician (Radiation Oncology)    Outpatient Medications Prior to Visit   Medication Sig Dispense Refill   • amLODIPine (NORVASC) 10 MG tablet TAKE 1 TABLET EVERY DAY 90 tablet 3   • aspirin 81 MG EC tablet Take 81 mg by mouth Daily.     • atorvastatin (LIPITOR) 80 MG tablet Take 80 mg by mouth Daily.     • gabapentin (NEURONTIN) 300 MG capsule Take 300 mg by mouth 3 (Three) Times a Day As Needed.     • losartan (COZAAR) 50 MG tablet TAKE 1 TABLET EVERY DAY  .  STOP  LISINOPRIL 90 tablet 0   • magnesium oxide (MAG-OX) 400 MG tablet Take 400 mg by mouth Daily.     • meclizine (ANTIVERT) 25 MG tablet 1/2 to 1 tablet 3 times daily as needed for vertigo 21 tablet 0   • metFORMIN ER (GLUCOPHAGE-XR) 500 MG 24 hr tablet      • pioglitazone (ACTOS) 45 MG tablet Take 45 mg by mouth Daily.     • pravastatin (PRAVACHOL) 80 MG tablet Take 80 mg by mouth Daily.     • sildenafil (VIAGRA) 100 MG tablet Take 100 mg by mouth Daily As Needed for Erectile Dysfunction.     • metoprolol succinate XL (TOPROL-XL) 100  MG 24 hr tablet Take 100 mg by mouth Daily.     • metoprolol succinate XL (TOPROL-XL) 50 MG 24 hr tablet TAKE 1 TABLET EVERY DAY 90 tablet 0   • Omega-3 Fatty Acids (FISH OIL) 1000 MG capsule capsule Take 2,000 mg by mouth 2 (Two) Times a Day With Meals.     • warfarin (COUMADIN) 5 MG tablet TAKE 2 TABLETS ON MONDAY, WEDNESDAY, FRIDAY, SUNDAY AND TAKE 1 AND 1/2 TABLETS ON TUESDAY, THURSDAY AND SATURDAY 163 tablet 3     Facility-Administered Medications Prior to Visit   Medication Dose Route Frequency Provider Last Rate Last Admin   • phytonadione (MEPHYTON, VITAMIN K) tablet 10 mg  10 mg Oral Once Kath Mitchell MD           No opioid medication identified on active medication list. I have reviewed chart for other potential  high risk medication/s and harmful drug interactions in the elderly.          Aspirin is on active medication list. Aspirin use is indicated based on review of current medical condition/s. Pros and cons of this therapy have been discussed today. Benefits of this medication outweigh potential harm.  Patient has been encouraged to continue taking this medication.  .      Patient Active Problem List   Diagnosis   • Lung mass, right lower lobe vs right hilum, PET avid   • Mediastinal adenopathy, PET avidity station 4R, &, 11R/mass   • Primary lung cancer, right (HCC)   • Cigarette nicotine dependence without complication   • Allergic rhinitis due to pollen   • Atherosclerotic heart disease   • Atrial fibrillation (HCC)   • Chronic back pain   • COPD, severe (HCC)   • Dyslipidemia   • Erectile dysfunction   • Malignant neoplasm of oropharynx (HCC)   • Peripheral vascular disease (HCC)   • Prostate cancer (HCC)   • Type 2 diabetes mellitus with diabetic polyneuropathy (HCC)     Advance Care Planning  Advance Directive is not on file.  ACP discussion was held with the patient during this visit. Patient does not have an advance directive, information provided.     Objective    Vitals:     01/05/23 1020   BP: 132/82   BP Location: Left arm   Patient Position: Sitting   Cuff Size: Adult   Pulse: 83   Temp: 98.2 °F (36.8 °C)   TempSrc: Temporal   SpO2: 96%   Weight: 95.5 kg (210 lb 9.6 oz)   Height: 182.9 cm (72\")     Estimated body mass index is 28.56 kg/m² as calculated from the following:    Height as of this encounter: 182.9 cm (72\").    Weight as of this encounter: 95.5 kg (210 lb 9.6 oz).    BMI is >= 25 and <30. (Overweight) The following options were offered after discussion;: weight loss educational material (shared in after visit summary), exercise counseling/recommendations and nutrition counseling/recommendations      Does the patient have evidence of cognitive impairment? Yes          HEALTH RISK ASSESSMENT    Smoking Status:  Social History     Tobacco Use   Smoking Status Every Day   • Packs/day: 1.00   • Years: 55.00   • Pack years: 55.00   • Types: Cigarettes   Smokeless Tobacco Never     Alcohol Consumption:  Social History     Substance and Sexual Activity   Alcohol Use Yes   • Alcohol/week: 7.0 standard drinks   • Types: 7 Shots of liquor per week    Comment: Yes, 1-2 shots of whiskey daily     Fall Risk Screen:    LALI Fall Risk Assessment was completed, and patient is at LOW risk for falls.Assessment completed on:1/5/2023    Depression Screening:  PHQ-2/PHQ-9 Depression Screening 1/5/2023   Little Interest or Pleasure in Doing Things 0-->not at all   Feeling Down, Depressed or Hopeless 0-->not at all   PHQ-9: Brief Depression Severity Measure Score 0       Health Habits and Functional and Cognitive Screening:  No flowsheet data found.    Age-appropriate Screening Schedule:  Refer to the list below for future screening recommendations based on patient's age, sex and/or medical conditions. Orders for these recommended tests are listed in the plan section. The patient has been provided with a written plan.    Health Maintenance   Topic Date Due   • URINE MICROALBUMIN  Never done   •  TDAP/TD VACCINES (1 - Tdap) Never done   • ZOSTER VACCINE (1 of 2) Never done   • DIABETIC FOOT EXAM  Never done   • DIABETIC EYE EXAM  Never done   • HEMOGLOBIN A1C  04/06/2018   • LIPID PANEL  10/06/2018   • INFLUENZA VACCINE  Completed                CMS Preventative Services Quick Reference  Risk Factors Identified During Encounter  Alcohol Misuse: Patient encouraged to limit alcohol use to no more than 1 standard alcoholic beverage per day. (12 ounce beer, 6 ounce wine, one shot liquor)  Chronic Pain: NSAIDs per medication orders.  Immunizations Discussed/Encouraged: Influenza and Prevnar 20 (Pneumococcal 20-valent conjugate)  Tobacco Use/Dependance Risk cessation discussed  Dental Screening Recommended recommended dental evaluation  Vision Screening Recommended recommended ophthalmology or tongue to evaluate  The above risks/problems have been discussed with the patient.  Pertinent information has been shared with the patient in the After Visit Summary.  An After Visit Summary and PPPS were made available to the patient.    Follow Up:   Next Medicare Wellness visit to be scheduled in 1 year.       Additional E&M Note during same encounter follows:  Patient has multiple medical problems which are significant and separately identifiable that require additional work above and beyond the Medicare Wellness Visit.      Chief Complaint  Annual Exam    Subjective      HPI  Stephane Noe is also being seen today for general complete physical and review.  Patient has a history of adenocarcinoma lung cancer, supraglottic squamous cell cancer, and prostate cancer, also coronary artery disease with chronic atrial fibrillation, EMMETT placement, no longer on warfarin given throat hemorrhaging with treatment with a supraglottic cancer, decision made that negative risk-benefit, now just on aspirin.  No chest pains palpitations dyspnea dizziness or edema.  History of diabetes with some mild neuropathy, most recent hemoglobin  A1c 5.6% in 7/2022, blood sugars in the low 100s, blood pressures averaging 120/80 range.  History of COPD with ongoing 5 cigarette/day smoker, current with CT screening follow-up with Dr. Ray of oncology for his prior lung cancer, history of AAA repair in 2011 by Dr. Marshall Palencia.  He does have a probable skin cancer in his upper chest that is to be excised, does complain acutely of some right elbow pain with some swelling redness, nontraumatic.  No other acute problems or concerns         Objective   Vital Signs:  /82 (BP Location: Left arm, Patient Position: Sitting, Cuff Size: Adult)   Pulse 83   Temp 98.2 °F (36.8 °C) (Temporal)   Ht 182.9 cm (72\")   Wt 95.5 kg (210 lb 9.6 oz)   SpO2 96%   BMI 28.56 kg/m²     Physical Exam  Vitals and nursing note reviewed.   Constitutional:       Appearance: Normal appearance.      Comments: No acute distress, alert and oriented, fluent speech, overweight, weight down 10 pounds in the last 6 months, chronically always has a gravelly voice   HENT:      Head: Normocephalic and atraumatic.      Right Ear: Tympanic membrane, ear canal and external ear normal.      Left Ear: Tympanic membrane, ear canal and external ear normal.      Nose: Nose normal.      Comments: Chronic flattening of nose, chronic nasal congestion with clear turbinates and scant mucus     Mouth/Throat:      Mouth: Mucous membranes are moist.      Pharynx: Oropharynx is clear.      Comments: Upper denture plate, partial lower plate with residual dentition poor condition, mucous membranes otherwise moist and clear  Eyes:      Extraocular Movements: Extraocular movements intact.      Conjunctiva/sclera: Conjunctivae normal.      Pupils: Pupils are equal, round, and reactive to light.      Comments: Fundi poorly evaluated given limitations of exam always appears to have some mild injection of his conjunctive a bilaterally with no discharge and no lid edema or chemosis   Neck:      Comments:  Moderate decreased range of motion of his neck to rotation and extension with good flexion, no adenopathy masses or tenderness, 2+ carotids without bruits bilaterally  Cardiovascular:      Pulses: Normal pulses.      Heart sounds: Normal heart sounds.      Comments: Irregular rate rhythm with no auscultated murmurs gallops or rubs, chronically having trace to 1+ stasis minimally pitting edema in both lower extremities around ankles, 2+ carotids without bruits, 2+ radial pulses, femoral pulses not examined, 1-2+ DP and difficult to palpate PT pulses bilaterally given some stasis edema, 2-second capillary perfusion, chronic hyperemia of toes, chronically dry feet with multi toenail onychomycosis, sensation in both feet intact to fine touch vibration and pinprick  Pulmonary:      Effort: Pulmonary effort is normal.      Breath sounds: Normal breath sounds.      Comments: Lungs clear with no wheeze tachypnea dyspnea or cough  Abdominal:      Palpations: There is no mass.      Tenderness: There is no abdominal tenderness. There is no guarding or rebound.      Comments: Mild to moderate androgenic obesity, no rebound or guard, no organomegaly masses, normal active bowel sounds, well-healed lower abdominal surgical   Genitourinary:     Comments: Normal uncircumcised male with testes descended bilaterally, no nodules or tenderness, no inguinal herniation or adenopathy, patient declining rectal exam as routinely performed by urology  Musculoskeletal:         General: No swelling, tenderness or deformity. Normal range of motion.      Right lower leg: No edema.      Left lower leg: No edema.      Comments: Right external elbow with several centimeter diameter area of mild swelling warmth and tenderness but no skin breakdown, consistent with olecranon bursitis, mild degenerative changes in his extremities nonspecifically with no functional limitations, does intermittently have some low back pain but none to palpation   Skin:      General: Skin is warm and dry.      Capillary Refill: Capillary refill takes less than 2 seconds.      Findings: No lesion or rash.      Comments: 1/2 cm raised irregular bordered pink skin lesion in the superior sternal supraglottic notch region consistent with a skin cancer, skin in feet generally dry, multi toenail onychomycosis   Neurological:      General: No focal deficit present.      Mental Status: He is alert and oriented to person, place, and time. Mental status is at baseline.      Comments: Does note chronically some vague numbness on the left anterolateral thigh   Psychiatric:         Mood and Affect: Mood normal.         Behavior: Behavior normal.         Thought Content: Thought content normal.         Judgment: Judgment normal.              ECG 12 Lead    Date/Time: 1/5/2023 11:44 AM  Performed by: Nain Morel MD  Authorized by: Nain Morel MD   Comparison: compared with previous ECG from 1/3/2022  Similar to previous ECG  Comparison to previous ECG: Atrial fibrillation, rate 69, no acute ischemic change, no significant change versus prior study                  Assessment and Plan Diagnoses and all orders for this visit:    1. Encounter for general adult medical examination with abnormal findings (Primary)  -     TSH Rfx On Abnormal To Free T4; Future  -     CBC & Differential; Future  -     Comprehensive Metabolic Panel; Future  -     Lipid Panel; Future  -     Hepatitis C Antibody; Future  -     Hemoglobin A1c; Future  -     MicroAlbumin, Urine, Random - Urine, Clean Catch; Future  -     Urinalysis With Culture If Indicated -; Future  -     PSA DIAGNOSTIC ONLY; Future    2. Olecranon bursitis of right elbow  -     predniSONE (DELTASONE) 20 MG tablet; Take 1 tablet by mouth Daily for 5 days.  Dispense: 5 tablet; Refill: 0  Nontraumatic.  No evidence of secondary infection.  Treat with prednisone conservative dose as noted along with ibuprofen 600 mg 3 times daily PC as needed, and avoid  pressure on the lesion.  Advise if not improving over the next couple weeks at which point we will then consider orthopedic referral  3. Meralgia paresthetica of left side  Discussed pathophysiology, recommending weight loss and avoiding tight fitting pants  4. Atherosclerosis of native coronary artery of native heart without angina pectoris  -     ECG 12 Lead  Asymptomatic.  Continue risk factor modification with his metoprolol XL 50 mg daily, atorvastatin 80 mg nightly, losartan 50 mg daily, and aspirin daily.  EKG as noted revealing stable persistent A. fib with no acute ischemic change.  5. Permanent atrial fibrillation (HCC)  -     ECG 12 Lead  Previous hemorrhage  oral hemorrhaging when previously taking warfarin with his radiation therapy for supraglottic cancer, decision made secondarily by his oncologist Dr. Ray to discontinue given poor risk-benefit.  We discussed pros and cons of anticoagulation versus aspirin and we have agreed to continue aspirin alone recognizing that there is a increased risk of clot albeit a lower risk of recurrent hemorrhage.  EKG today revealing a stable persistent atrial fibrillation with no acute ischemic changes.  6. History of heart artery stent  -     ECG 12 Lead  Continue risk factor modification.  7. Type 2 diabetes mellitus with diabetic polyneuropathy, without long-term current use of insulin (HCC)  -     Comprehensive Metabolic Panel; Future  -     Hemoglobin A1c; Future  Most recent hemoglobin A1c 5.6% in 7/2022.  Continue current regimen with metformin  mg at 4 tablets daily and Actos 45 mg daily, taking ACE inhibitor.  Not requiring any targeted medication for his neuropathic symptoms  8. Essential hypertension, benign  -     Comprehensive Metabolic Panel; Future  -     ECG 12 Lead  Satisfactory control blood pressure acutely chronically, continuing current regimen of losartan 50 mg daily, amlodipine 10 mg daily, and metoprolol XL 50 mg daily.  9.  Dyslipidemia  -     Lipid Panel; Future  Continues on atorvastatin 80 mg nightly.  Update lipid profile.  10. History of AAA (abdominal aortic aneurysm) repair  -     [place] aaa screen limited; Future  Status post repair in 2011 by Dr. Andi Palencia.  We will repeat aortic surveillance ultrasound  11. COPD, severe (HCC)  Really having very minimal symptoms continue to smoke about 5 cigarettes daily, longstanding heavy cigarette smoker.  Does not require any targeted inhalers at this time, previously having never taken them when prescribed  12. Primary lung cancer, right (HCC)  Keep follow-up with Dr. Ray, appears to be in remission per most recent imaging  13. Cigarette nicotine dependence without complication  Currently reduced from his prior 1.5 pack/day cigarette smoking down to 5 cigarettes daily.  Encouraged complete abstinence  14. History of prostate cancer  -     PSA DIAGNOSTIC ONLY; Future  Check diagnostic PSA.  Keep follow-up with Dr. Anand Watson  15. Erectile dysfunction due to arterial insufficiency    16. Chronic midline low back pain without sciatica  Intermittent symptoms.  Not lifestyle limiting  17. Non-seasonal allergic rhinitis due to pollen  Has prior prescription of nasal steroid inhaler which she can use as needed.  18. Dental caries  Advised regarding need for dental evaluation.  19. Skin cancer of anterior chest  Patient indicates already scheduled to see dermatology for excision.  20. Need for vaccination  -     Fluzone High-Dose 65+yrs  -     Pneumococcal Conjugate Vaccine 20-Valent All    21. Need for hepatitis C screening test  -     Hepatitis C Antibody; Future    Patient current with colonoscopy screening, 5-year follow-up due in 9/2026      Follow Up Return in about 6 months (around 7/5/2023) for Recheck.  Patient was given instructions and counseling regarding his condition or for health maintenance advice. Please see specific information pulled into the AVS if appropriate.

## 2023-01-06 ENCOUNTER — CLINICAL SUPPORT (OUTPATIENT)
Dept: FAMILY MEDICINE CLINIC | Facility: CLINIC | Age: 73
End: 2023-01-06
Payer: MEDICARE

## 2023-01-06 DIAGNOSIS — Z00.01 ENCOUNTER FOR GENERAL ADULT MEDICAL EXAMINATION WITH ABNORMAL FINDINGS: ICD-10-CM

## 2023-01-06 DIAGNOSIS — E78.5 DYSLIPIDEMIA: ICD-10-CM

## 2023-01-06 DIAGNOSIS — E11.42 TYPE 2 DIABETES MELLITUS WITH DIABETIC POLYNEUROPATHY, WITHOUT LONG-TERM CURRENT USE OF INSULIN: ICD-10-CM

## 2023-01-06 DIAGNOSIS — Z85.46 HISTORY OF PROSTATE CANCER: ICD-10-CM

## 2023-01-06 DIAGNOSIS — Z11.59 NEED FOR HEPATITIS C SCREENING TEST: ICD-10-CM

## 2023-01-06 DIAGNOSIS — I10 ESSENTIAL HYPERTENSION, BENIGN: ICD-10-CM

## 2023-01-06 PROCEDURE — 36415 COLL VENOUS BLD VENIPUNCTURE: CPT | Performed by: INTERNAL MEDICINE

## 2023-01-06 NOTE — PROGRESS NOTES
Venipuncture Blood Specimen Collection  Venipuncture performed in left arm by Erin Gutierrez MA with good hemostasis. Patient tolerated the procedure well without complications.   01/06/23   Erin Gutierrez MA

## 2023-01-07 LAB
ALBUMIN SERPL-MCNC: 4.3 G/DL (ref 3.7–4.7)
ALBUMIN/GLOB SERPL: 1.6 {RATIO} (ref 1.2–2.2)
ALP SERPL-CCNC: 79 IU/L (ref 44–121)
ALT SERPL-CCNC: 22 IU/L (ref 0–44)
APPEARANCE UR: CLEAR
AST SERPL-CCNC: 36 IU/L (ref 0–40)
BACTERIA #/AREA URNS HPF: NORMAL /[HPF]
BASOPHILS # BLD AUTO: 0 X10E3/UL (ref 0–0.2)
BASOPHILS NFR BLD AUTO: 1 %
BILIRUB SERPL-MCNC: 0.7 MG/DL (ref 0–1.2)
BILIRUB UR QL STRIP: NEGATIVE
BUN SERPL-MCNC: 14 MG/DL (ref 8–27)
BUN/CREAT SERPL: 16 (ref 10–24)
CALCIUM SERPL-MCNC: 9.5 MG/DL (ref 8.6–10.2)
CASTS URNS QL MICRO: NORMAL /LPF
CHLORIDE SERPL-SCNC: 99 MMOL/L (ref 96–106)
CHOLEST SERPL-MCNC: 94 MG/DL (ref 100–199)
CO2 SERPL-SCNC: 22 MMOL/L (ref 20–29)
COLOR UR: YELLOW
CREAT SERPL-MCNC: 0.85 MG/DL (ref 0.76–1.27)
EGFRCR SERPLBLD CKD-EPI 2021: 92 ML/MIN/1.73
EOSINOPHIL # BLD AUTO: 0 X10E3/UL (ref 0–0.4)
EOSINOPHIL NFR BLD AUTO: 1 %
EPI CELLS #/AREA URNS HPF: NORMAL /HPF (ref 0–10)
ERYTHROCYTE [DISTWIDTH] IN BLOOD BY AUTOMATED COUNT: 14.4 % (ref 11.6–15.4)
GLOBULIN SER CALC-MCNC: 2.7 G/DL (ref 1.5–4.5)
GLUCOSE SERPL-MCNC: 81 MG/DL (ref 70–99)
GLUCOSE UR QL STRIP: NEGATIVE
HBA1C MFR BLD: 5.6 % (ref 4.8–5.6)
HCT VFR BLD AUTO: 40.6 % (ref 37.5–51)
HCV AB S/CO SERPL IA: <0.1 S/CO RATIO (ref 0–0.9)
HDLC SERPL-MCNC: 42 MG/DL
HGB BLD-MCNC: 14.2 G/DL (ref 13–17.7)
HGB UR QL STRIP: NEGATIVE
IMM GRANULOCYTES # BLD AUTO: 0 X10E3/UL (ref 0–0.1)
IMM GRANULOCYTES NFR BLD AUTO: 1 %
KETONES UR QL STRIP: ABNORMAL
LDLC SERPL CALC-MCNC: 33 MG/DL (ref 0–99)
LEUKOCYTE ESTERASE UR QL STRIP: NEGATIVE
LYMPHOCYTES # BLD AUTO: 1.1 X10E3/UL (ref 0.7–3.1)
LYMPHOCYTES NFR BLD AUTO: 22 %
MCH RBC QN AUTO: 38.2 PG (ref 26.6–33)
MCHC RBC AUTO-ENTMCNC: 35 G/DL (ref 31.5–35.7)
MCV RBC AUTO: 109 FL (ref 79–97)
MICRO URNS: ABNORMAL
MICRO URNS: ABNORMAL
MICROALBUMIN UR-MCNC: 17.8 UG/ML
MONOCYTES # BLD AUTO: 0.4 X10E3/UL (ref 0.1–0.9)
MONOCYTES NFR BLD AUTO: 8 %
NEUTROPHILS # BLD AUTO: 3.4 X10E3/UL (ref 1.4–7)
NEUTROPHILS NFR BLD AUTO: 67 %
NITRITE UR QL STRIP: NEGATIVE
PH UR STRIP: 5.5 [PH] (ref 5–7.5)
PLATELET # BLD AUTO: 186 X10E3/UL (ref 150–450)
POTASSIUM SERPL-SCNC: 4.5 MMOL/L (ref 3.5–5.2)
PROT SERPL-MCNC: 7 G/DL (ref 6–8.5)
PROT UR QL STRIP: NEGATIVE
PSA SERPL-MCNC: 0.2 NG/ML (ref 0–4)
RBC # BLD AUTO: 3.72 X10E6/UL (ref 4.14–5.8)
RBC #/AREA URNS HPF: NORMAL /HPF (ref 0–2)
SODIUM SERPL-SCNC: 140 MMOL/L (ref 134–144)
SP GR UR STRIP: 1.01 (ref 1–1.03)
TRIGL SERPL-MCNC: 98 MG/DL (ref 0–149)
TSH SERPL DL<=0.005 MIU/L-ACNC: 3.82 UIU/ML (ref 0.45–4.5)
URINALYSIS REFLEX: ABNORMAL
UROBILINOGEN UR STRIP-MCNC: 0.2 MG/DL (ref 0.2–1)
VLDLC SERPL CALC-MCNC: 19 MG/DL (ref 5–40)
WBC # BLD AUTO: 4.9 X10E3/UL (ref 3.4–10.8)
WBC #/AREA URNS HPF: NORMAL /HPF (ref 0–5)

## 2023-01-10 ENCOUNTER — TELEPHONE (OUTPATIENT)
Dept: FAMILY MEDICINE CLINIC | Facility: CLINIC | Age: 73
End: 2023-01-10
Payer: MEDICARE

## 2023-01-10 NOTE — TELEPHONE ENCOUNTER
I have spoke with him regarding his lab results and have let him know we will repeat in a year. TF

## 2023-01-10 NOTE — TELEPHONE ENCOUNTER
----- Message from Nain Morel MD sent at 1/10/2023  4:02 PM EST -----  Please advise patient that his recent ultrasound of the aorta reveals his stent in good position, with a very minimal dilation of his aorta which will simply be monitored on a yearly basis with another ultrasound.

## 2023-01-10 NOTE — TELEPHONE ENCOUNTER
----- Message from Nain Morel MD sent at 1/10/2023  4:00 PM EST -----  Please advise patient that his recently obtained lab testing is very satisfactory including his hemoglobin A1c screening for diabetes very borderline at 5.6%, cholesterol profile excellent, urinalysis normal, urine microalbumin for protein normal, hepatitis C negative, CBC normal other than enlarged red blood cells which are consistent with his alcohol consumption advised to discontinue, CMP/chemistry profile normal, thyroid testing normal and PSA for prostate cancer screening normal.  No changes to be made regarding medication.

## 2023-02-20 ENCOUNTER — OFFICE VISIT (OUTPATIENT)
Dept: FAMILY MEDICINE CLINIC | Facility: CLINIC | Age: 73
End: 2023-02-20
Payer: MEDICARE

## 2023-02-20 VITALS
BODY MASS INDEX: 27.71 KG/M2 | DIASTOLIC BLOOD PRESSURE: 58 MMHG | HEART RATE: 78 BPM | TEMPERATURE: 97.2 F | SYSTOLIC BLOOD PRESSURE: 107 MMHG | WEIGHT: 204.6 LBS | OXYGEN SATURATION: 96 % | HEIGHT: 72 IN

## 2023-02-20 DIAGNOSIS — E11.621 ULCER OF RIGHT GREAT TOE DUE TO DIABETES MELLITUS: Primary | ICD-10-CM

## 2023-02-20 DIAGNOSIS — L97.519 ULCER OF RIGHT GREAT TOE DUE TO DIABETES MELLITUS: Primary | ICD-10-CM

## 2023-02-20 DIAGNOSIS — L97.909 PERIPHERAL VASCULAR DISEASE OF LOWER EXTREMITY WITH ULCERATION: ICD-10-CM

## 2023-02-20 DIAGNOSIS — I73.9 PERIPHERAL VASCULAR DISEASE OF LOWER EXTREMITY WITH ULCERATION: ICD-10-CM

## 2023-02-20 DIAGNOSIS — E11.42 TYPE 2 DIABETES MELLITUS WITH DIABETIC POLYNEUROPATHY, WITHOUT LONG-TERM CURRENT USE OF INSULIN: ICD-10-CM

## 2023-02-20 PROBLEM — C32.1 MALIGNANT NEOPLASM OF SUPRAGLOTTIS (HCC): Status: ACTIVE | Noted: 2023-01-04

## 2023-02-20 PROBLEM — C34.81 MALIGNANT NEOPLASM OF OVERLAPPING SITES OF RIGHT LUNG: Status: ACTIVE | Noted: 2023-01-04

## 2023-02-20 PROCEDURE — 99214 OFFICE O/P EST MOD 30 MIN: CPT | Performed by: INTERNAL MEDICINE

## 2023-02-20 RX ORDER — AMOXICILLIN AND CLAVULANATE POTASSIUM 875; 125 MG/1; MG/1
1 TABLET, FILM COATED ORAL 2 TIMES DAILY
Qty: 20 TABLET | Refills: 0 | Status: SHIPPED | OUTPATIENT
Start: 2023-02-20 | End: 2023-02-20

## 2023-02-20 RX ORDER — AMOXICILLIN AND CLAVULANATE POTASSIUM 875; 125 MG/1; MG/1
1 TABLET, FILM COATED ORAL 2 TIMES DAILY
Qty: 20 TABLET | Refills: 0 | Status: SHIPPED | OUTPATIENT
Start: 2023-02-20 | End: 2023-03-02

## 2023-02-20 NOTE — PROGRESS NOTES
Follow Up Office Visit      Date: 2023   Patient Name: Stephane Noe  : 1950   MRN: [id number]     Chief Complaint:    Chief Complaint   Patient presents with   • Toe Pain     Bleeding. Black. oozing       History of Present Illness: Stephane Noe is a 72 y.o. male who is here today for note of blister with oozing and blackened discoloration on the right great toe 2 or 3 days ago, having some mild associated pain.  No history of trauma.  No fevers or chills.  Patient is a diabetic historically having good control with blood sugars around 100, history of PVD of bilateral lower extremities, status post AAA repair by Dr. Palencia in .  Patient continues to smoke, approximately 2 or 3 cigarettes daily, previous 1 pack/day cigarette smoker, history of lung cancer in remission, epiglottic carcinoma in remission.    Subjective      Review of Systems:   Review of Systems    I have reviewed the patients family history, social history, past medical history, past surgical history and have updated it as appropriate.     Medications:     Current Outpatient Medications:   •  amLODIPine (NORVASC) 10 MG tablet, TAKE 1 TABLET EVERY DAY, Disp: 90 tablet, Rfl: 3  •  amoxicillin-clavulanate (Augmentin) 875-125 MG per tablet, Take 1 tablet by mouth 2 (Two) Times a Day for 10 days. Take with probiotics over-the-counter twice daily, Disp: 20 tablet, Rfl: 0  •  aspirin 81 MG EC tablet, Take 81 mg by mouth Daily., Disp: , Rfl:   •  atorvastatin (LIPITOR) 80 MG tablet, Take 80 mg by mouth Daily., Disp: , Rfl:   •  gabapentin (NEURONTIN) 300 MG capsule, Take 300 mg by mouth 3 (Three) Times a Day As Needed., Disp: , Rfl:   •  losartan (COZAAR) 50 MG tablet, TAKE 1 TABLET EVERY DAY  .  STOP  LISINOPRIL, Disp: 90 tablet, Rfl: 0  •  magnesium oxide (MAG-OX) 400 MG tablet, Take 400 mg by mouth Daily., Disp: , Rfl:   •  meclizine (ANTIVERT) 25 MG tablet, 1/2 to 1 tablet 3 times daily as needed for vertigo, Disp: 21 tablet,  "Rfl: 0  •  metFORMIN ER (GLUCOPHAGE-XR) 500 MG 24 hr tablet, , Disp: , Rfl:   •  metoprolol succinate XL (TOPROL-XL) 50 MG 24 hr tablet, TAKE 1 TABLET EVERY DAY, Disp: 90 tablet, Rfl: 0  •  pioglitazone (ACTOS) 45 MG tablet, Take 45 mg by mouth Daily., Disp: , Rfl:   •  sildenafil (VIAGRA) 100 MG tablet, Take 100 mg by mouth Daily As Needed for Erectile Dysfunction., Disp: , Rfl:     Allergies:   Allergies   Allergen Reactions   • Lisinopril Cough       Objective     Physical Exam: Please see above  Vital Signs:   Vitals:    02/20/23 1148   BP: 107/58   Pulse: 78   Temp: 97.2 °F (36.2 °C)   TempSrc: Temporal   SpO2: 96%   Weight: 92.8 kg (204 lb 9.6 oz)   Height: 182.9 cm (72\")     Body mass index is 27.75 kg/m².       Physical Exam  General: Pleasant 72-year-old white male, BMI 27.7, no acute distress.  Lungs clear  Cardiac regular rate rhythm with no murmurs gallops or rubs  Bilateral femoral arteries 2+ with no auscultated bruits, bipedal exam with difficult to palpate bipedal pulses, dry flaky skin chronically with multi toenail onychomycosis, noting the distal phalanx of the right great toe has a large 3 x 3.5 cm bullous lesion covered by bandage, removing the bandage causing the skin to exude with copious serous material, underlying erythema and some purplish discoloration of the base of the ulcer with some mild tenderness, foot does appear warm again but poorly palpable pulses, 2-second capillary perfusion.  The open ulcer on the right great toe was cleansed with chlorhexidine water wash, followed by Maxorb application, nonstick pad, and gauze wrap    Procedures    Results:   Labs:   Hemoglobin A1C   Date Value Ref Range Status   01/06/2023 5.6 4.8 - 5.6 % Final     Comment:              Prediabetes: 5.7 - 6.4           Diabetes: >6.4           Glycemic control for adults with diabetes: <7.0     10/06/2017 7.10 (H) 4.80 - 5.60 % Final     TSH   Date Value Ref Range Status   01/06/2023 3.820 0.450 - 4.500 " uIU/mL Final        POCT Results (if applicable):   Results for orders placed or performed in visit on 01/06/23   Hemoglobin A1c    Specimen: Arm, Left; Blood    Blood  Release to fredis   Result Value Ref Range    Hemoglobin A1C 5.6 4.8 - 5.6 %   Lipid Panel    Specimen: Arm, Left; Blood    Blood  Release to [facility]   Result Value Ref Range    Total Cholesterol 94 (L) 100 - 199 mg/dL    Triglycerides 98 0 - 149 mg/dL    HDL Cholesterol 42 >39 mg/dL    VLDL Cholesterol Henrry 19 5 - 40 mg/dL    LDL Chol Calc (NIH) 33 0 - 99 mg/dL   Hepatitis C Antibody    Specimen: Arm, Left; Blood    Blood  Release to [facility]redis   Result Value Ref Range    Hep C Virus Ab <0.1 0.0 - 0.9 s/co ratio   Comprehensive Metabolic Panel    Specimen: Arm, Left; Blood    Blood  Release to [facility]redis   Result Value Ref Range    Glucose 81 70 - 99 mg/dL    BUN 14 8 - 27 mg/dL    Creatinine 0.85 0.76 - 1.27 mg/dL    EGFR Result 92 >59 mL/min/1.73    BUN/Creatinine Ratio 16 10 - 24    Sodium 140 134 - 144 mmol/L    Potassium 4.5 3.5 - 5.2 mmol/L    Chloride 99 96 - 106 mmol/L    Total CO2 22 20 - 29 mmol/L    Calcium 9.5 8.6 - 10.2 mg/dL    Total Protein 7.0 6.0 - 8.5 g/dL    Albumin 4.3 3.7 - 4.7 g/dL    Globulin 2.7 1.5 - 4.5 g/dL    A/G Ratio 1.6 1.2 - 2.2    Total Bilirubin 0.7 0.0 - 1.2 mg/dL    Alkaline Phosphatase 79 44 - 121 IU/L    AST (SGOT) 36 0 - 40 IU/L    ALT (SGPT) 22 0 - 44 IU/L   TSH Rfx On Abnormal To Free T4    Specimen: Arm, Left; Blood    Blood  Release to fredis   Result Value Ref Range    TSH 3.820 0.450 - 4.500 uIU/mL   PSA DIAGNOSTIC ONLY    Specimen: Arm, Left; Blood    Blood  Release to fredis   Result Value Ref Range    PSA 0.2 0.0 - 4.0 ng/mL   Urinalysis With Culture If Indicated - Urine, Random Void    Specimen: Urine, Random Void    Urine   Result Value Ref Range    Specific Gravity, UA 1.015 1.005 - 1.030    pH, UA 5.5 5.0 - 7.5    Color, UA Yellow Yellow    Appearance, UA Clear Clear    Leukocytes, UA Negative Negative    Protein  Negative Negative/Trace    Glucose, UA Negative Negative    Ketones Trace (A) Negative    Blood, UA Negative Negative    Bilirubin, UA Negative Negative    Urobilinogen, UA 0.2 0.2 - 1.0 mg/dL    Nitrite, UA Negative Negative    Microscopic Examination Comment     Microscopic Examination See below:     Urinalysis Reflex Comment    MicroAlbumin, Urine, Random - Urine, Random Void    Specimen: Urine, Random Void    Urine   Result Value Ref Range    Microalbumin, Urine 17.8 Not Estab. ug/mL   Microscopic Examination -    Urine   Result Value Ref Range    WBC, UA 0-5 0 - 5 /hpf    RBC, UA None seen 0 - 2 /hpf    Epithelial Cells (non renal) 0-10 0 - 10 /hpf    Casts None seen None seen /lpf    Bacteria, UA None seen None seen/Few   CBC & Differential    Specimen: Arm, Left; Blood    Blood  Release to fredis   Result Value Ref Range    WBC 4.9 3.4 - 10.8 x10E3/uL    RBC 3.72 (L) 4.14 - 5.80 x10E6/uL    Hemoglobin 14.2 13.0 - 17.7 g/dL    Hematocrit 40.6 37.5 - 51.0 %     (H) 79 - 97 fL    MCH 38.2 (H) 26.6 - 33.0 pg    MCHC 35.0 31.5 - 35.7 g/dL    RDW 14.4 11.6 - 15.4 %    Platelets 186 150 - 450 x10E3/uL    Neutrophil Rel % 67 Not Estab. %    Lymphocyte Rel % 22 Not Estab. %    Monocyte Rel % 8 Not Estab. %    Eosinophil Rel % 1 Not Estab. %    Basophil Rel % 1 Not Estab. %    Neutrophils Absolute 3.4 1.4 - 7.0 x10E3/uL    Lymphocytes Absolute 1.1 0.7 - 3.1 x10E3/uL    Monocytes Absolute 0.4 0.1 - 0.9 x10E3/uL    Eosinophils Absolute 0.0 0.0 - 0.4 x10E3/uL    Basophils Absolute 0.0 0.0 - 0.2 x10E3/uL    Immature Granulocyte Rel % 1 Not Estab. %    Immature Grans Absolute 0.0 0.0 - 0.1 x10E3/uL       Imaging:   No valid procedures specified.     Smoking Cessation:   3-10 mintues spent counseling Will try to cut down    Assessment / Plan      Assessment/Plan:   Diagnoses and all orders for this visit:    1. Ulcer of right great toe due to diabetes mellitus (HCC) (Primary)  -     Discontinue: amoxicillin-clavulanate  (Augmentin) 875-125 MG per tablet; Take 1 tablet by mouth 2 (Two) Times a Day for 10 days. Take with probiotics over-the-counter twice daily  Dispense: 20 tablet; Refill: 0  -     amoxicillin-clavulanate (Augmentin) 875-125 MG per tablet; Take 1 tablet by mouth 2 (Two) Times a Day for 10 days. Take with probiotics over-the-counter twice daily  Dispense: 20 tablet; Refill: 0  Patient has developed spontaneous large ulcer on the right great toe, distal phalanx, with possible secondary infection though not definitive.  Concern regarding underlying PVD as the etiology compounded by his diabetes mellitus, though the latter has historically had good control recently.  The wound has been dressed with chlorhexidine followed by Maxorb and dressing, patient to be referred to both wound care as well as cardiovascular surgery, noting he has previous documented aorta iliac disease and previous AAA repair by Dr. Marshall Palencia of CV surgery.  Patient advised strongly of need to completely discontinue his cigarette habit  2. Peripheral vascular disease of lower extremity with ulceration (HCC)  See above.  3. Type 2 diabetes mellitus with diabetic polyneuropathy, without long-term current use of insulin (HCC)  Most recent hemoglobin A1c 5.6% in 1/2023, currently taking Actos 45 mg daily and metformin XL 2000 mg daily.    Addendum: Patient has been scheduled for wound care through [facility] this coming Friday, 2/24/2023, and advised to change his dressings in 2 days, with the above samples having been given to patient, which should be sufficient until his wound care appointment.  He will also be given appointment to follow-up with CV surgery as referred above.  Patient is aware of his above appointment.    Follow Up:   Return if symptoms worsen or fail to improve.      At [facility], we believe that sharing information builds trust and better relationships. You are receiving this note because you recently visited [facility]. It is possible you will see health information before a provider has talked with you about it. This kind of information can be easy to misunderstand. To help you fully understand what it means for your health, we urge you to discuss this note with your provider.    Nain Morel MD  [facility]

## 2023-02-24 ENCOUNTER — HOSPITAL ENCOUNTER (OUTPATIENT)
Dept: PHYSICAL THERAPY | Facility: HOSPITAL | Age: 73
Setting detail: THERAPIES SERIES
Discharge: HOME | End: 2023-02-24
Payer: MEDICARE

## 2023-02-24 DIAGNOSIS — S91.101D OPEN WOUND OF RIGHT GREAT TOE, SUBSEQUENT ENCOUNTER: Primary | ICD-10-CM

## 2023-02-24 DIAGNOSIS — L97.519 DIABETIC ULCER OF TOE OF RIGHT FOOT ASSOCIATED WITH TYPE 2 DIABETES MELLITUS, UNSPECIFIED ULCER STAGE: ICD-10-CM

## 2023-02-24 DIAGNOSIS — E11.621 DIABETIC ULCER OF TOE OF RIGHT FOOT ASSOCIATED WITH TYPE 2 DIABETES MELLITUS, UNSPECIFIED ULCER STAGE: ICD-10-CM

## 2023-02-24 PROCEDURE — 97161 PT EVAL LOW COMPLEX 20 MIN: CPT

## 2023-02-24 PROCEDURE — 97597 DBRDMT OPN WND 1ST 20 CM/<: CPT
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
## 2023-02-27 ENCOUNTER — HOSPITAL ENCOUNTER (INPATIENT)
Facility: HOSPITAL | Age: 73
LOS: 3 days | Discharge: LEFT BEFORE DISCHARGE | DRG: 616 | End: 2023-03-03
Attending: INTERNAL MEDICINE | Admitting: FAMILY MEDICINE
Payer: MEDICARE

## 2023-02-27 ENCOUNTER — APPOINTMENT (OUTPATIENT)
Dept: PHYSICAL THERAPY | Facility: HOSPITAL | Age: 73
End: 2023-02-27
Payer: MEDICARE

## 2023-02-27 ENCOUNTER — TELEPHONE (OUTPATIENT)
Dept: FAMILY MEDICINE CLINIC | Facility: CLINIC | Age: 73
End: 2023-02-27
Payer: MEDICARE

## 2023-02-27 ENCOUNTER — OFFICE VISIT (OUTPATIENT)
Dept: CARDIAC SURGERY | Facility: CLINIC | Age: 73
End: 2023-02-27
Payer: MEDICARE

## 2023-02-27 ENCOUNTER — APPOINTMENT (OUTPATIENT)
Dept: GENERAL RADIOLOGY | Facility: HOSPITAL | Age: 73
DRG: 616 | End: 2023-02-27
Payer: MEDICARE

## 2023-02-27 VITALS
TEMPERATURE: 98 F | HEIGHT: 72 IN | WEIGHT: 200.6 LBS | SYSTOLIC BLOOD PRESSURE: 90 MMHG | HEART RATE: 100 BPM | DIASTOLIC BLOOD PRESSURE: 50 MMHG | OXYGEN SATURATION: 95 % | BODY MASS INDEX: 27.17 KG/M2

## 2023-02-27 DIAGNOSIS — E11.621 ULCER OF RIGHT GREAT TOE DUE TO DIABETES MELLITUS: ICD-10-CM

## 2023-02-27 DIAGNOSIS — L97.909 PERIPHERAL VASCULAR DISEASE OF LOWER EXTREMITY WITH ULCERATION: ICD-10-CM

## 2023-02-27 DIAGNOSIS — I73.9 PERIPHERAL VASCULAR DISEASE: ICD-10-CM

## 2023-02-27 DIAGNOSIS — E11.621 ULCER OF RIGHT GREAT TOE DUE TO DIABETES MELLITUS: Primary | ICD-10-CM

## 2023-02-27 DIAGNOSIS — I73.9 PERIPHERAL VASCULAR DISEASE OF LOWER EXTREMITY WITH ULCERATION: ICD-10-CM

## 2023-02-27 DIAGNOSIS — L97.519 ULCER OF RIGHT GREAT TOE DUE TO DIABETES MELLITUS: ICD-10-CM

## 2023-02-27 DIAGNOSIS — L97.519 SKIN ULCER OF RIGHT GREAT TOE, UNSPECIFIED ULCER STAGE: Primary | ICD-10-CM

## 2023-02-27 DIAGNOSIS — L97.519 ULCER OF RIGHT GREAT TOE DUE TO DIABETES MELLITUS: Primary | ICD-10-CM

## 2023-02-27 PROBLEM — E11.622 DIABETIC ULCER OF LOWER EXTREMITY (HCC): Status: ACTIVE | Noted: 2023-02-27

## 2023-02-27 LAB
ACANTHOCYTES BLD QL SMEAR: NORMAL
ALBUMIN SERPL-MCNC: 3.9 G/DL (ref 3.5–5.2)
ALBUMIN/GLOB SERPL: 1.3 G/DL
ALP SERPL-CCNC: 77 U/L (ref 39–117)
ALT SERPL W P-5'-P-CCNC: 19 U/L (ref 1–41)
ANION GAP SERPL CALCULATED.3IONS-SCNC: 11 MMOL/L (ref 5–15)
AST SERPL-CCNC: 34 U/L (ref 1–40)
BASOPHILS # BLD AUTO: 0.03 10*3/MM3 (ref 0–0.2)
BASOPHILS NFR BLD AUTO: 0.7 % (ref 0–1.5)
BILIRUB SERPL-MCNC: 0.9 MG/DL (ref 0–1.2)
BUN SERPL-MCNC: 7 MG/DL (ref 8–23)
BUN/CREAT SERPL: 10.3 (ref 7–25)
CALCIUM SPEC-SCNC: 9.2 MG/DL (ref 8.6–10.5)
CHLORIDE SERPL-SCNC: 100 MMOL/L (ref 98–107)
CO2 SERPL-SCNC: 29 MMOL/L (ref 22–29)
CREAT SERPL-MCNC: 0.68 MG/DL (ref 0.76–1.27)
CRP SERPL-MCNC: 1.12 MG/DL (ref 0–0.5)
D DIMER PPP FEU-MCNC: 3.45 MCGFEU/ML (ref 0–0.73)
D-LACTATE SERPL-SCNC: 1.3 MMOL/L (ref 0.5–2)
DEPRECATED RDW RBC AUTO: 60.8 FL (ref 37–54)
EGFRCR SERPLBLD CKD-EPI 2021: 98.1 ML/MIN/1.73
EOSINOPHIL # BLD AUTO: 0.02 10*3/MM3 (ref 0–0.4)
EOSINOPHIL NFR BLD AUTO: 0.5 % (ref 0.3–6.2)
ERYTHROCYTE [DISTWIDTH] IN BLOOD BY AUTOMATED COUNT: 14.6 % (ref 12.3–15.4)
ERYTHROCYTE [SEDIMENTATION RATE] IN BLOOD: 21 MM/HR (ref 0–20)
GEN 5 2HR TROPONIN T REFLEX: 18 NG/L
GLOBULIN UR ELPH-MCNC: 3.1 GM/DL
GLUCOSE BLDC GLUCOMTR-MCNC: 118 MG/DL (ref 70–130)
GLUCOSE SERPL-MCNC: 90 MG/DL (ref 65–99)
HBA1C MFR BLD: 5 % (ref 4.8–5.6)
HCT VFR BLD AUTO: 39.1 % (ref 37.5–51)
HGB BLD-MCNC: 13.6 G/DL (ref 13–17.7)
IMM GRANULOCYTES # BLD AUTO: 0.01 10*3/MM3 (ref 0–0.05)
IMM GRANULOCYTES NFR BLD AUTO: 0.2 % (ref 0–0.5)
LYMPHOCYTES # BLD AUTO: 0.57 10*3/MM3 (ref 0.7–3.1)
LYMPHOCYTES NFR BLD AUTO: 13.3 % (ref 19.6–45.3)
MACROCYTES BLD QL SMEAR: NORMAL
MCH RBC QN AUTO: 39.1 PG (ref 26.6–33)
MCHC RBC AUTO-ENTMCNC: 34.8 G/DL (ref 31.5–35.7)
MCV RBC AUTO: 112.4 FL (ref 79–97)
MONOCYTES # BLD AUTO: 0.41 10*3/MM3 (ref 0.1–0.9)
MONOCYTES NFR BLD AUTO: 9.5 % (ref 5–12)
NEUTROPHILS NFR BLD AUTO: 3.26 10*3/MM3 (ref 1.7–7)
NEUTROPHILS NFR BLD AUTO: 75.8 % (ref 42.7–76)
NRBC BLD AUTO-RTO: 0 /100 WBC (ref 0–0.2)
NT-PROBNP SERPL-MCNC: 946.1 PG/ML (ref 0–900)
PLAT MORPH BLD: NORMAL
PLATELET # BLD AUTO: 153 10*3/MM3 (ref 140–450)
PMV BLD AUTO: 11 FL (ref 6–12)
POTASSIUM SERPL-SCNC: 3.7 MMOL/L (ref 3.5–5.2)
PROCALCITONIN SERPL-MCNC: 0.07 NG/ML (ref 0–0.25)
PROT SERPL-MCNC: 7 G/DL (ref 6–8.5)
RBC # BLD AUTO: 3.48 10*6/MM3 (ref 4.14–5.8)
SODIUM SERPL-SCNC: 140 MMOL/L (ref 136–145)
TOXIC GRANULATION: NORMAL
TROPONIN T DELTA: -1 NG/L
TROPONIN T SERPL HS-MCNC: 19 NG/L
WBC NRBC COR # BLD: 4.3 10*3/MM3 (ref 3.4–10.8)

## 2023-02-27 PROCEDURE — 25010000002 VANCOMYCIN 10 G RECONSTITUTED SOLUTION: Performed by: NURSE PRACTITIONER

## 2023-02-27 PROCEDURE — 82962 GLUCOSE BLOOD TEST: CPT

## 2023-02-27 PROCEDURE — 93923 UPR/LXTR ART STDY 3+ LVLS: CPT | Performed by: STUDENT IN AN ORGANIZED HEALTH CARE EDUCATION/TRAINING PROGRAM

## 2023-02-27 PROCEDURE — 84484 ASSAY OF TROPONIN QUANT: CPT | Performed by: INTERNAL MEDICINE

## 2023-02-27 PROCEDURE — 86140 C-REACTIVE PROTEIN: CPT | Performed by: NURSE PRACTITIONER

## 2023-02-27 PROCEDURE — 87040 BLOOD CULTURE FOR BACTERIA: CPT | Performed by: NURSE PRACTITIONER

## 2023-02-27 PROCEDURE — 80053 COMPREHEN METABOLIC PANEL: CPT | Performed by: NURSE PRACTITIONER

## 2023-02-27 PROCEDURE — 73660 X-RAY EXAM OF TOE(S): CPT

## 2023-02-27 PROCEDURE — 83605 ASSAY OF LACTIC ACID: CPT | Performed by: INTERNAL MEDICINE

## 2023-02-27 PROCEDURE — 85007 BL SMEAR W/DIFF WBC COUNT: CPT | Performed by: INTERNAL MEDICINE

## 2023-02-27 PROCEDURE — 85025 COMPLETE CBC W/AUTO DIFF WBC: CPT | Performed by: INTERNAL MEDICINE

## 2023-02-27 PROCEDURE — 25010000002 PIPERACILLIN SOD-TAZOBACTAM PER 1 G: Performed by: NURSE PRACTITIONER

## 2023-02-27 PROCEDURE — G0378 HOSPITAL OBSERVATION PER HR: HCPCS

## 2023-02-27 PROCEDURE — 85379 FIBRIN DEGRADATION QUANT: CPT | Performed by: NURSE PRACTITIONER

## 2023-02-27 PROCEDURE — 84145 PROCALCITONIN (PCT): CPT | Performed by: NURSE PRACTITIONER

## 2023-02-27 PROCEDURE — 83880 ASSAY OF NATRIURETIC PEPTIDE: CPT | Performed by: NURSE PRACTITIONER

## 2023-02-27 PROCEDURE — 85652 RBC SED RATE AUTOMATED: CPT | Performed by: NURSE PRACTITIONER

## 2023-02-27 PROCEDURE — 83036 HEMOGLOBIN GLYCOSYLATED A1C: CPT | Performed by: NURSE PRACTITIONER

## 2023-02-27 PROCEDURE — 99204 OFFICE O/P NEW MOD 45 MIN: CPT | Performed by: THORACIC SURGERY (CARDIOTHORACIC VASCULAR SURGERY)

## 2023-02-27 RX ORDER — METOPROLOL SUCCINATE 50 MG/1
50 TABLET, EXTENDED RELEASE ORAL DAILY
Status: DISCONTINUED | OUTPATIENT
Start: 2023-02-28 | End: 2023-03-03 | Stop reason: HOSPADM

## 2023-02-27 RX ORDER — NICOTINE POLACRILEX 4 MG
15 LOZENGE BUCCAL
Status: DISCONTINUED | OUTPATIENT
Start: 2023-02-27 | End: 2023-03-03 | Stop reason: HOSPADM

## 2023-02-27 RX ORDER — BISACODYL 5 MG/1
5 TABLET, DELAYED RELEASE ORAL DAILY PRN
Status: DISCONTINUED | OUTPATIENT
Start: 2023-02-27 | End: 2023-03-03 | Stop reason: HOSPADM

## 2023-02-27 RX ORDER — IBUPROFEN 600 MG/1
1 TABLET ORAL
Status: DISCONTINUED | OUTPATIENT
Start: 2023-02-27 | End: 2023-03-03 | Stop reason: HOSPADM

## 2023-02-27 RX ORDER — ACETAMINOPHEN 650 MG/1
650 SUPPOSITORY RECTAL EVERY 4 HOURS PRN
Status: DISCONTINUED | OUTPATIENT
Start: 2023-02-27 | End: 2023-03-03 | Stop reason: HOSPADM

## 2023-02-27 RX ORDER — ALBUTEROL SULFATE 2.5 MG/3ML
2.5 SOLUTION RESPIRATORY (INHALATION) EVERY 6 HOURS PRN
Status: DISCONTINUED | OUTPATIENT
Start: 2023-02-27 | End: 2023-03-03 | Stop reason: HOSPADM

## 2023-02-27 RX ORDER — SODIUM CHLORIDE 0.9 % (FLUSH) 0.9 %
10 SYRINGE (ML) INJECTION EVERY 12 HOURS SCHEDULED
Status: DISCONTINUED | OUTPATIENT
Start: 2023-02-27 | End: 2023-03-03 | Stop reason: HOSPADM

## 2023-02-27 RX ORDER — SODIUM CHLORIDE 9 MG/ML
40 INJECTION, SOLUTION INTRAVENOUS AS NEEDED
Status: DISCONTINUED | OUTPATIENT
Start: 2023-02-27 | End: 2023-03-03 | Stop reason: HOSPADM

## 2023-02-27 RX ORDER — AMOXICILLIN 250 MG
2 CAPSULE ORAL 2 TIMES DAILY
Status: DISCONTINUED | OUTPATIENT
Start: 2023-02-27 | End: 2023-03-03 | Stop reason: HOSPADM

## 2023-02-27 RX ORDER — VANCOMYCIN HYDROCHLORIDE 1 G/200ML
1000 INJECTION, SOLUTION INTRAVENOUS EVERY 12 HOURS
Status: DISCONTINUED | OUTPATIENT
Start: 2023-02-28 | End: 2023-03-01

## 2023-02-27 RX ORDER — ACETAMINOPHEN 160 MG/5ML
650 SOLUTION ORAL EVERY 4 HOURS PRN
Status: DISCONTINUED | OUTPATIENT
Start: 2023-02-27 | End: 2023-03-03 | Stop reason: HOSPADM

## 2023-02-27 RX ORDER — SODIUM CHLORIDE 0.9 % (FLUSH) 0.9 %
10 SYRINGE (ML) INJECTION AS NEEDED
Status: DISCONTINUED | OUTPATIENT
Start: 2023-02-27 | End: 2023-03-03 | Stop reason: HOSPADM

## 2023-02-27 RX ORDER — ONDANSETRON 4 MG/1
4 TABLET, FILM COATED ORAL EVERY 6 HOURS PRN
Status: DISCONTINUED | OUTPATIENT
Start: 2023-02-27 | End: 2023-03-03 | Stop reason: HOSPADM

## 2023-02-27 RX ORDER — ATORVASTATIN CALCIUM 40 MG/1
80 TABLET, FILM COATED ORAL NIGHTLY
Status: DISCONTINUED | OUTPATIENT
Start: 2023-02-27 | End: 2023-03-03 | Stop reason: HOSPADM

## 2023-02-27 RX ORDER — ACETAMINOPHEN 325 MG/1
650 TABLET ORAL EVERY 4 HOURS PRN
Status: DISCONTINUED | OUTPATIENT
Start: 2023-02-27 | End: 2023-03-03 | Stop reason: HOSPADM

## 2023-02-27 RX ORDER — ASPIRIN 81 MG/1
81 TABLET ORAL DAILY
Status: DISCONTINUED | OUTPATIENT
Start: 2023-02-28 | End: 2023-03-03 | Stop reason: HOSPADM

## 2023-02-27 RX ORDER — DEXTROSE MONOHYDRATE 25 G/50ML
25 INJECTION, SOLUTION INTRAVENOUS
Status: DISCONTINUED | OUTPATIENT
Start: 2023-02-27 | End: 2023-03-03 | Stop reason: HOSPADM

## 2023-02-27 RX ORDER — LOSARTAN POTASSIUM 50 MG/1
50 TABLET ORAL
Status: DISCONTINUED | OUTPATIENT
Start: 2023-02-28 | End: 2023-03-01

## 2023-02-27 RX ORDER — ONDANSETRON 2 MG/ML
4 INJECTION INTRAMUSCULAR; INTRAVENOUS EVERY 6 HOURS PRN
Status: DISCONTINUED | OUTPATIENT
Start: 2023-02-27 | End: 2023-03-03 | Stop reason: HOSPADM

## 2023-02-27 RX ORDER — IPRATROPIUM BROMIDE AND ALBUTEROL SULFATE 2.5; .5 MG/3ML; MG/3ML
3 SOLUTION RESPIRATORY (INHALATION) EVERY 6 HOURS PRN
Status: DISCONTINUED | OUTPATIENT
Start: 2023-02-27 | End: 2023-03-03 | Stop reason: HOSPADM

## 2023-02-27 RX ORDER — AMLODIPINE BESYLATE 10 MG/1
10 TABLET ORAL DAILY
Status: DISCONTINUED | OUTPATIENT
Start: 2023-02-28 | End: 2023-02-28

## 2023-02-27 RX ORDER — BISACODYL 10 MG
10 SUPPOSITORY, RECTAL RECTAL DAILY PRN
Status: DISCONTINUED | OUTPATIENT
Start: 2023-02-27 | End: 2023-03-03 | Stop reason: HOSPADM

## 2023-02-27 RX ORDER — MECLIZINE HYDROCHLORIDE 25 MG/1
25 TABLET ORAL 3 TIMES DAILY PRN
Status: DISCONTINUED | OUTPATIENT
Start: 2023-02-27 | End: 2023-03-03 | Stop reason: HOSPADM

## 2023-02-27 RX ORDER — INSULIN LISPRO 100 [IU]/ML
0-7 INJECTION, SOLUTION INTRAVENOUS; SUBCUTANEOUS
Status: DISCONTINUED | OUTPATIENT
Start: 2023-02-27 | End: 2023-03-03 | Stop reason: HOSPADM

## 2023-02-27 RX ORDER — CALCIUM CARBONATE 200(500)MG
2 TABLET,CHEWABLE ORAL 2 TIMES DAILY PRN
Status: DISCONTINUED | OUTPATIENT
Start: 2023-02-27 | End: 2023-03-03 | Stop reason: HOSPADM

## 2023-02-27 RX ORDER — POLYETHYLENE GLYCOL 3350 17 G/17G
17 POWDER, FOR SOLUTION ORAL DAILY PRN
Status: DISCONTINUED | OUTPATIENT
Start: 2023-02-27 | End: 2023-03-03 | Stop reason: HOSPADM

## 2023-02-27 RX ADMIN — VANCOMYCIN HYDROCHLORIDE 1750 MG: 10 INJECTION, POWDER, LYOPHILIZED, FOR SOLUTION INTRAVENOUS at 20:32

## 2023-02-27 RX ADMIN — Medication 10 ML: at 20:33

## 2023-02-27 RX ADMIN — ATORVASTATIN CALCIUM 80 MG: 40 TABLET, FILM COATED ORAL at 20:33

## 2023-02-27 RX ADMIN — TAZOBACTAM SODIUM AND PIPERACILLIN SODIUM 3.38 G: 375; 3 INJECTION, SOLUTION INTRAVENOUS at 19:36

## 2023-02-27 NOTE — TELEPHONE ENCOUNTER
He has gotten a rx from [facility] for an antibiotic and then he received a notification regarding an rx of antibiotic from his mail order pharmacy.  I have let him know to take the rx from [facility] and I have canceled the rx with his mail order. TF
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
## 2023-02-27 NOTE — TELEPHONE ENCOUNTER
Caller: Stephane Noe    Relationship: Self    Best call back number: [phone]    What is the best time to reach you: ANYTIME    Who are you requesting to speak with (clinical staff, provider,  specific staff member): PROVIDER OR CLINICAL STAFF    What was the call regarding: PATIENT STATES THAT HE WAS PRESCRIBED THE SAME MEDICATION  FROM 2 DIFFERENT PHARMACY'S AND WOULD LIKE TO DISCUSS    Do you require a callback: YES

## 2023-02-28 ENCOUNTER — ANESTHESIA EVENT (OUTPATIENT)
Dept: PERIOP | Facility: HOSPITAL | Age: 73
DRG: 616 | End: 2023-02-28
Payer: MEDICARE

## 2023-02-28 ENCOUNTER — APPOINTMENT (OUTPATIENT)
Dept: MRI IMAGING | Facility: HOSPITAL | Age: 73
DRG: 616 | End: 2023-02-28
Payer: MEDICARE

## 2023-02-28 PROBLEM — L97.509 ULCER OF GREAT TOE (HCC): Status: ACTIVE | Noted: 2023-02-28

## 2023-02-28 PROBLEM — M86.9 OSTEOMYELITIS OF TOE: Status: ACTIVE | Noted: 2023-02-20

## 2023-02-28 LAB
ABO GROUP BLD: NORMAL
ANION GAP SERPL CALCULATED.3IONS-SCNC: 10 MMOL/L (ref 5–15)
BLD GP AB SCN SERPL QL: NEGATIVE
BUN SERPL-MCNC: 7 MG/DL (ref 8–23)
BUN/CREAT SERPL: 11.3 (ref 7–25)
CALCIUM SPEC-SCNC: 8.6 MG/DL (ref 8.6–10.5)
CHLORIDE SERPL-SCNC: 105 MMOL/L (ref 98–107)
CO2 SERPL-SCNC: 27 MMOL/L (ref 22–29)
CREAT SERPL-MCNC: 0.62 MG/DL (ref 0.76–1.27)
DEPRECATED RDW RBC AUTO: 60.9 FL (ref 37–54)
EGFRCR SERPLBLD CKD-EPI 2021: 100.9 ML/MIN/1.73
ERYTHROCYTE [DISTWIDTH] IN BLOOD BY AUTOMATED COUNT: 14.7 % (ref 12.3–15.4)
GLUCOSE BLDC GLUCOMTR-MCNC: 107 MG/DL (ref 70–130)
GLUCOSE BLDC GLUCOMTR-MCNC: 130 MG/DL (ref 70–130)
GLUCOSE BLDC GLUCOMTR-MCNC: 90 MG/DL (ref 70–130)
GLUCOSE SERPL-MCNC: 79 MG/DL (ref 65–99)
HCT VFR BLD AUTO: 34.1 % (ref 37.5–51)
HGB BLD-MCNC: 11.8 G/DL (ref 13–17.7)
MAGNESIUM SERPL-MCNC: 2.3 MG/DL (ref 1.6–2.4)
MCH RBC QN AUTO: 38.9 PG (ref 26.6–33)
MCHC RBC AUTO-ENTMCNC: 34.6 G/DL (ref 31.5–35.7)
MCV RBC AUTO: 112.5 FL (ref 79–97)
MRSA DNA SPEC QL NAA+PROBE: NEGATIVE
PLATELET # BLD AUTO: 128 10*3/MM3 (ref 140–450)
PMV BLD AUTO: 10.4 FL (ref 6–12)
POTASSIUM SERPL-SCNC: 3.7 MMOL/L (ref 3.5–5.2)
PREALB SERPL-MCNC: 12.3 MG/DL (ref 20–40)
RBC # BLD AUTO: 3.03 10*6/MM3 (ref 4.14–5.8)
RH BLD: POSITIVE
SODIUM SERPL-SCNC: 142 MMOL/L (ref 136–145)
T&S EXPIRATION DATE: NORMAL
WBC NRBC COR # BLD: 5.23 10*3/MM3 (ref 3.4–10.8)

## 2023-02-28 PROCEDURE — 84134 ASSAY OF PREALBUMIN: CPT

## 2023-02-28 PROCEDURE — 82962 GLUCOSE BLOOD TEST: CPT

## 2023-02-28 PROCEDURE — A9577 INJ MULTIHANCE: HCPCS | Performed by: INTERNAL MEDICINE

## 2023-02-28 PROCEDURE — 25010000002 VANCOMYCIN PER 500 MG

## 2023-02-28 PROCEDURE — 86850 RBC ANTIBODY SCREEN: CPT | Performed by: PHYSICIAN ASSISTANT

## 2023-02-28 PROCEDURE — 99232 SBSQ HOSP IP/OBS MODERATE 35: CPT | Performed by: INTERNAL MEDICINE

## 2023-02-28 PROCEDURE — 73720 MRI LWR EXTREMITY W/O&W/DYE: CPT

## 2023-02-28 PROCEDURE — 25010000002 ENOXAPARIN PER 10 MG: Performed by: INTERNAL MEDICINE

## 2023-02-28 PROCEDURE — 86900 BLOOD TYPING SEROLOGIC ABO: CPT | Performed by: PHYSICIAN ASSISTANT

## 2023-02-28 PROCEDURE — 0 GADOBENATE DIMEGLUMINE 529 MG/ML SOLUTION: Performed by: INTERNAL MEDICINE

## 2023-02-28 PROCEDURE — 25010000002 PIPERACILLIN SOD-TAZOBACTAM PER 1 G: Performed by: NURSE PRACTITIONER

## 2023-02-28 PROCEDURE — 87641 MR-STAPH DNA AMP PROBE: CPT

## 2023-02-28 PROCEDURE — 99232 SBSQ HOSP IP/OBS MODERATE 35: CPT | Performed by: THORACIC SURGERY (CARDIOTHORACIC VASCULAR SURGERY)

## 2023-02-28 PROCEDURE — 86901 BLOOD TYPING SEROLOGIC RH(D): CPT | Performed by: PHYSICIAN ASSISTANT

## 2023-02-28 PROCEDURE — 83735 ASSAY OF MAGNESIUM: CPT | Performed by: NURSE PRACTITIONER

## 2023-02-28 PROCEDURE — 85027 COMPLETE CBC AUTOMATED: CPT | Performed by: NURSE PRACTITIONER

## 2023-02-28 PROCEDURE — 80048 BASIC METABOLIC PNL TOTAL CA: CPT | Performed by: NURSE PRACTITIONER

## 2023-02-28 RX ORDER — FAMOTIDINE 10 MG/ML
20 INJECTION, SOLUTION INTRAVENOUS ONCE
Status: CANCELLED | OUTPATIENT
Start: 2023-02-28 | End: 2023-02-28

## 2023-02-28 RX ORDER — ENOXAPARIN SODIUM 100 MG/ML
40 INJECTION SUBCUTANEOUS EVERY 24 HOURS
Status: DISCONTINUED | OUTPATIENT
Start: 2023-02-28 | End: 2023-03-03 | Stop reason: HOSPADM

## 2023-02-28 RX ADMIN — LOSARTAN POTASSIUM 50 MG: 50 TABLET, FILM COATED ORAL at 08:50

## 2023-02-28 RX ADMIN — TAZOBACTAM SODIUM AND PIPERACILLIN SODIUM 3.38 G: 375; 3 INJECTION, SOLUTION INTRAVENOUS at 00:01

## 2023-02-28 RX ADMIN — ENOXAPARIN SODIUM 40 MG: 40 INJECTION SUBCUTANEOUS at 21:28

## 2023-02-28 RX ADMIN — VANCOMYCIN HYDROCHLORIDE 1000 MG: 1 INJECTION, SOLUTION INTRAVENOUS at 11:09

## 2023-02-28 RX ADMIN — VANCOMYCIN HYDROCHLORIDE 1000 MG: 1 INJECTION, SOLUTION INTRAVENOUS at 21:29

## 2023-02-28 RX ADMIN — TAZOBACTAM SODIUM AND PIPERACILLIN SODIUM 3.38 G: 375; 3 INJECTION, SOLUTION INTRAVENOUS at 18:45

## 2023-02-28 RX ADMIN — SENNOSIDES AND DOCUSATE SODIUM 2 TABLET: 8.6; 5 TABLET ORAL at 21:28

## 2023-02-28 RX ADMIN — AMLODIPINE BESYLATE 10 MG: 10 TABLET ORAL at 08:51

## 2023-02-28 RX ADMIN — Medication 10 ML: at 21:29

## 2023-02-28 RX ADMIN — METOPROLOL SUCCINATE 50 MG: 50 TABLET, EXTENDED RELEASE ORAL at 08:51

## 2023-02-28 RX ADMIN — GADOBENATE DIMEGLUMINE 15 ML: 529 INJECTION, SOLUTION INTRAVENOUS at 15:04

## 2023-02-28 RX ADMIN — TAZOBACTAM SODIUM AND PIPERACILLIN SODIUM 3.38 G: 375; 3 INJECTION, SOLUTION INTRAVENOUS at 08:50

## 2023-02-28 RX ADMIN — ASPIRIN 81 MG: 81 TABLET, COATED ORAL at 08:50

## 2023-02-28 RX ADMIN — SENNOSIDES AND DOCUSATE SODIUM 2 TABLET: 8.6; 5 TABLET ORAL at 08:51

## 2023-02-28 RX ADMIN — Medication 10 ML: at 08:51

## 2023-02-28 RX ADMIN — ATORVASTATIN CALCIUM 80 MG: 40 TABLET, FILM COATED ORAL at 21:27

## 2023-02-28 RX ADMIN — MAGNESIUM OXIDE 400 MG (241.3 MG MAGNESIUM) TABLET 400 MG: TABLET at 08:51

## 2023-02-28 NOTE — PROGRESS NOTES
Procedure   Noninvasive vascular study -segmental blood pressures and pulse volume recordings    Date/Time: 2023 2:25 PM  Performed by: Keshawn Todd MD  Authorized by: Keshawn Todd MD           [facility]  [address] [place], [place] [address] [phone]            Procedure Performed: Segmental Blood Pressures and Pulse Volume Recordings    Reading Physician:   Keshawn Todd M.D., R.P.V.I.   Ordered by:   Marshall Palencia MD Study date:   2023       Patient Information    Patient Name    Stephane Noe MRN    [id number]  (Age)    December 3, 1950 (72 y.o.)     Clinical Indication    Diabetes, active tobacco smoking, blister on right toe, history of open infrarenal aortic aneurysm repair     Interpretation Summary    • Right lower extremity: FABIO 1.06. Waveforms consistent with normal exam.   • Left lower extremity: FABIO 1.07. Waveforms consistent with normal exam.         Right Lower Extremity Pulse Volume Recordings    Right Measurements Waveform Characteristics   Right high thigh  Multiphasic   Right low thigh  Multiphasic   Right below knee calf Multiphasic   Right ankle Multiphasic        Left Lower Extremity Pulse Volume Recordings    Left Measurements Waveform Characteristics   Left high thigh Multiphasic   Left low thigh Multiphasic   Left below knee calf Multiphasic   Left ankle Multiphasic        Pressure Measurements    Pressures Right (mmHg) Left (mmHg)   Brachial  104  88   High thigh  127  138   Low thigh  115  100   Below knee calf  128  120   PT   109  110     111   FABIO  1.06  1.07                  Keshawn Todd M.D., R.P.V.I.  Cardiothoracic and Vascular Surgeon  [facility]

## 2023-03-01 ENCOUNTER — DOCUMENTATION (OUTPATIENT)
Dept: PHYSICAL THERAPY | Facility: HOSPITAL | Age: 73
End: 2023-03-01
Payer: MEDICARE

## 2023-03-01 ENCOUNTER — ANESTHESIA (OUTPATIENT)
Dept: PERIOP | Facility: HOSPITAL | Age: 73
DRG: 616 | End: 2023-03-01
Payer: MEDICARE

## 2023-03-01 ENCOUNTER — ANESTHESIA EVENT CONVERTED (OUTPATIENT)
Dept: ANESTHESIOLOGY | Facility: HOSPITAL | Age: 73
DRG: 616 | End: 2023-03-01
Payer: MEDICARE

## 2023-03-01 LAB
ANION GAP SERPL CALCULATED.3IONS-SCNC: 10 MMOL/L (ref 5–15)
BUN SERPL-MCNC: 7 MG/DL (ref 8–23)
BUN/CREAT SERPL: 14.3 (ref 7–25)
CALCIUM SPEC-SCNC: 8.7 MG/DL (ref 8.6–10.5)
CHLORIDE SERPL-SCNC: 103 MMOL/L (ref 98–107)
CO2 SERPL-SCNC: 29 MMOL/L (ref 22–29)
CREAT SERPL-MCNC: 0.49 MG/DL (ref 0.76–1.27)
DEPRECATED RDW RBC AUTO: 61.1 FL (ref 37–54)
EGFRCR SERPLBLD CKD-EPI 2021: 108.4 ML/MIN/1.73
ERYTHROCYTE [DISTWIDTH] IN BLOOD BY AUTOMATED COUNT: 14.5 % (ref 12.3–15.4)
GLUCOSE BLDC GLUCOMTR-MCNC: 110 MG/DL (ref 70–130)
GLUCOSE BLDC GLUCOMTR-MCNC: 122 MG/DL (ref 70–130)
GLUCOSE BLDC GLUCOMTR-MCNC: 91 MG/DL (ref 70–130)
GLUCOSE BLDC GLUCOMTR-MCNC: 91 MG/DL (ref 70–130)
GLUCOSE BLDC GLUCOMTR-MCNC: 96 MG/DL (ref 70–130)
GLUCOSE SERPL-MCNC: 83 MG/DL (ref 65–99)
HCT VFR BLD AUTO: 36.2 % (ref 37.5–51)
HGB BLD-MCNC: 12.6 G/DL (ref 13–17.7)
MAGNESIUM SERPL-MCNC: 1.6 MG/DL (ref 1.6–2.4)
MCH RBC QN AUTO: 39.7 PG (ref 26.6–33)
MCHC RBC AUTO-ENTMCNC: 34.8 G/DL (ref 31.5–35.7)
MCV RBC AUTO: 114.2 FL (ref 79–97)
PLATELET # BLD AUTO: 122 10*3/MM3 (ref 140–450)
PMV BLD AUTO: 10.8 FL (ref 6–12)
POTASSIUM SERPL-SCNC: 3.6 MMOL/L (ref 3.5–5.2)
POTASSIUM SERPL-SCNC: 4.3 MMOL/L (ref 3.5–5.2)
RBC # BLD AUTO: 3.17 10*6/MM3 (ref 4.14–5.8)
SODIUM SERPL-SCNC: 142 MMOL/L (ref 136–145)
VANCOMYCIN SERPL-MCNC: 12.4 MCG/ML (ref 5–40)
WBC NRBC COR # BLD: 5.04 10*3/MM3 (ref 3.4–10.8)

## 2023-03-01 PROCEDURE — 25010000002 DEXAMETHASONE PER 1 MG: Performed by: NURSE ANESTHETIST, CERTIFIED REGISTERED

## 2023-03-01 PROCEDURE — 25010000002 FENTANYL CITRATE (PF) 100 MCG/2ML SOLUTION: Performed by: NURSE ANESTHETIST, CERTIFIED REGISTERED

## 2023-03-01 PROCEDURE — 25010000002 PIPERACILLIN SOD-TAZOBACTAM PER 1 G: Performed by: PHYSICIAN ASSISTANT

## 2023-03-01 PROCEDURE — 25010000002 DEXAMETHASONE SODIUM PHOSPHATE 10 MG/ML SOLUTION: Performed by: NURSE ANESTHETIST, CERTIFIED REGISTERED

## 2023-03-01 PROCEDURE — 87205 SMEAR GRAM STAIN: CPT | Performed by: PHYSICIAN ASSISTANT

## 2023-03-01 PROCEDURE — 88305 TISSUE EXAM BY PATHOLOGIST: CPT | Performed by: THORACIC SURGERY (CARDIOTHORACIC VASCULAR SURGERY)

## 2023-03-01 PROCEDURE — 87176 TISSUE HOMOGENIZATION CULTR: CPT | Performed by: PHYSICIAN ASSISTANT

## 2023-03-01 PROCEDURE — 25010000002 VANCOMYCIN 10 G RECONSTITUTED SOLUTION

## 2023-03-01 PROCEDURE — 80202 ASSAY OF VANCOMYCIN: CPT

## 2023-03-01 PROCEDURE — 25010000002 PROPOFOL 10 MG/ML EMULSION: Performed by: NURSE ANESTHETIST, CERTIFIED REGISTERED

## 2023-03-01 PROCEDURE — 82962 GLUCOSE BLOOD TEST: CPT

## 2023-03-01 PROCEDURE — 99024 POSTOP FOLLOW-UP VISIT: CPT | Performed by: THORACIC SURGERY (CARDIOTHORACIC VASCULAR SURGERY)

## 2023-03-01 PROCEDURE — 87015 SPECIMEN INFECT AGNT CONCNTJ: CPT | Performed by: PHYSICIAN ASSISTANT

## 2023-03-01 PROCEDURE — 87102 FUNGUS ISOLATION CULTURE: CPT | Performed by: PHYSICIAN ASSISTANT

## 2023-03-01 PROCEDURE — 87147 CULTURE TYPE IMMUNOLOGIC: CPT | Performed by: PHYSICIAN ASSISTANT

## 2023-03-01 PROCEDURE — 25010000002 VANCOMYCIN 10 G RECONSTITUTED SOLUTION: Performed by: PHYSICIAN ASSISTANT

## 2023-03-01 PROCEDURE — 28810 AMPUTATION TOE & METATARSAL: CPT | Performed by: THORACIC SURGERY (CARDIOTHORACIC VASCULAR SURGERY)

## 2023-03-01 PROCEDURE — 87116 MYCOBACTERIA CULTURE: CPT | Performed by: PHYSICIAN ASSISTANT

## 2023-03-01 PROCEDURE — 83735 ASSAY OF MAGNESIUM: CPT | Performed by: INTERNAL MEDICINE

## 2023-03-01 PROCEDURE — 0Y6M0Z9 DETACHMENT AT RIGHT FOOT, PARTIAL 1ST RAY, OPEN APPROACH: ICD-10-PCS | Performed by: THORACIC SURGERY (CARDIOTHORACIC VASCULAR SURGERY)

## 2023-03-01 PROCEDURE — 25010000002 PIPERACILLIN SOD-TAZOBACTAM PER 1 G: Performed by: NURSE PRACTITIONER

## 2023-03-01 PROCEDURE — 87186 SC STD MICRODIL/AGAR DIL: CPT | Performed by: PHYSICIAN ASSISTANT

## 2023-03-01 PROCEDURE — 87070 CULTURE OTHR SPECIMN AEROBIC: CPT | Performed by: PHYSICIAN ASSISTANT

## 2023-03-01 PROCEDURE — 84132 ASSAY OF SERUM POTASSIUM: CPT | Performed by: PHYSICIAN ASSISTANT

## 2023-03-01 PROCEDURE — 80048 BASIC METABOLIC PNL TOTAL CA: CPT | Performed by: INTERNAL MEDICINE

## 2023-03-01 PROCEDURE — 99232 SBSQ HOSP IP/OBS MODERATE 35: CPT | Performed by: INTERNAL MEDICINE

## 2023-03-01 PROCEDURE — 85027 COMPLETE CBC AUTOMATED: CPT | Performed by: INTERNAL MEDICINE

## 2023-03-01 PROCEDURE — 87206 SMEAR FLUORESCENT/ACID STAI: CPT | Performed by: PHYSICIAN ASSISTANT

## 2023-03-01 PROCEDURE — 25010000002 ENOXAPARIN PER 10 MG: Performed by: PHYSICIAN ASSISTANT

## 2023-03-01 PROCEDURE — 25010000002 ONDANSETRON PER 1 MG: Performed by: NURSE ANESTHETIST, CERTIFIED REGISTERED

## 2023-03-01 PROCEDURE — 87075 CULTR BACTERIA EXCEPT BLOOD: CPT | Performed by: PHYSICIAN ASSISTANT

## 2023-03-01 PROCEDURE — 0 MAGNESIUM SULFATE 4 GM/100ML SOLUTION: Performed by: INTERNAL MEDICINE

## 2023-03-01 PROCEDURE — 28810 AMPUTATION TOE & METATARSAL: CPT | Performed by: PHYSICIAN ASSISTANT

## 2023-03-01 RX ORDER — DROPERIDOL 2.5 MG/ML
0.62 INJECTION, SOLUTION INTRAMUSCULAR; INTRAVENOUS
Status: DISCONTINUED | OUTPATIENT
Start: 2023-03-01 | End: 2023-03-01

## 2023-03-01 RX ORDER — LORAZEPAM 2 MG/ML
1 INJECTION INTRAMUSCULAR
Status: DISCONTINUED | OUTPATIENT
Start: 2023-03-01 | End: 2023-03-03 | Stop reason: HOSPADM

## 2023-03-01 RX ORDER — LORAZEPAM 0.5 MG/1
0.5 TABLET ORAL
Status: DISCONTINUED | OUTPATIENT
Start: 2023-03-01 | End: 2023-03-03 | Stop reason: HOSPADM

## 2023-03-01 RX ORDER — ONDANSETRON 2 MG/ML
4 INJECTION INTRAMUSCULAR; INTRAVENOUS ONCE AS NEEDED
Status: DISCONTINUED | OUTPATIENT
Start: 2023-03-01 | End: 2023-03-01

## 2023-03-01 RX ORDER — SODIUM CHLORIDE 0.9 % (FLUSH) 0.9 %
3 SYRINGE (ML) INJECTION EVERY 12 HOURS SCHEDULED
Status: DISCONTINUED | OUTPATIENT
Start: 2023-03-01 | End: 2023-03-01

## 2023-03-01 RX ORDER — LIDOCAINE HYDROCHLORIDE 10 MG/ML
0.5 INJECTION, SOLUTION EPIDURAL; INFILTRATION; INTRACAUDAL; PERINEURAL ONCE AS NEEDED
Status: DISCONTINUED | OUTPATIENT
Start: 2023-03-01 | End: 2023-03-01 | Stop reason: HOSPADM

## 2023-03-01 RX ORDER — MAGNESIUM HYDROXIDE 1200 MG/15ML
LIQUID ORAL AS NEEDED
Status: DISCONTINUED | OUTPATIENT
Start: 2023-03-01 | End: 2023-03-01 | Stop reason: HOSPADM

## 2023-03-01 RX ORDER — SODIUM CHLORIDE 450 MG/100ML
50 INJECTION, SOLUTION INTRAVENOUS CONTINUOUS
Status: DISCONTINUED | OUTPATIENT
Start: 2023-03-01 | End: 2023-03-03 | Stop reason: HOSPADM

## 2023-03-01 RX ORDER — PHENYLEPHRINE HCL IN 0.9% NACL 1 MG/10 ML
SYRINGE (ML) INTRAVENOUS AS NEEDED
Status: DISCONTINUED | OUTPATIENT
Start: 2023-03-01 | End: 2023-03-01 | Stop reason: SURG

## 2023-03-01 RX ORDER — LIDOCAINE HYDROCHLORIDE 10 MG/ML
INJECTION, SOLUTION EPIDURAL; INFILTRATION; INTRACAUDAL; PERINEURAL AS NEEDED
Status: DISCONTINUED | OUTPATIENT
Start: 2023-03-01 | End: 2023-03-01 | Stop reason: SURG

## 2023-03-01 RX ORDER — LORAZEPAM 1 MG/1
1 TABLET ORAL
Status: DISCONTINUED | OUTPATIENT
Start: 2023-03-01 | End: 2023-03-03 | Stop reason: HOSPADM

## 2023-03-01 RX ORDER — PROMETHAZINE HYDROCHLORIDE 25 MG/1
25 SUPPOSITORY RECTAL ONCE AS NEEDED
Status: DISCONTINUED | OUTPATIENT
Start: 2023-03-01 | End: 2023-03-01

## 2023-03-01 RX ORDER — ONDANSETRON 2 MG/ML
INJECTION INTRAMUSCULAR; INTRAVENOUS AS NEEDED
Status: DISCONTINUED | OUTPATIENT
Start: 2023-03-01 | End: 2023-03-01 | Stop reason: SURG

## 2023-03-01 RX ORDER — HYDRALAZINE HYDROCHLORIDE 20 MG/ML
5 INJECTION INTRAMUSCULAR; INTRAVENOUS
Status: DISCONTINUED | OUTPATIENT
Start: 2023-03-01 | End: 2023-03-01

## 2023-03-01 RX ORDER — SODIUM CHLORIDE 9 MG/ML
40 INJECTION, SOLUTION INTRAVENOUS AS NEEDED
Status: DISCONTINUED | OUTPATIENT
Start: 2023-03-01 | End: 2023-03-01

## 2023-03-01 RX ORDER — SODIUM CHLORIDE, SODIUM LACTATE, POTASSIUM CHLORIDE, CALCIUM CHLORIDE 600; 310; 30; 20 MG/100ML; MG/100ML; MG/100ML; MG/100ML
9 INJECTION, SOLUTION INTRAVENOUS CONTINUOUS
Status: DISCONTINUED | OUTPATIENT
Start: 2023-03-01 | End: 2023-03-01

## 2023-03-01 RX ORDER — IPRATROPIUM BROMIDE AND ALBUTEROL SULFATE 2.5; .5 MG/3ML; MG/3ML
3 SOLUTION RESPIRATORY (INHALATION) ONCE AS NEEDED
Status: DISCONTINUED | OUTPATIENT
Start: 2023-03-01 | End: 2023-03-01

## 2023-03-01 RX ORDER — FENTANYL CITRATE 50 UG/ML
INJECTION, SOLUTION INTRAMUSCULAR; INTRAVENOUS AS NEEDED
Status: DISCONTINUED | OUTPATIENT
Start: 2023-03-01 | End: 2023-03-01 | Stop reason: SURG

## 2023-03-01 RX ORDER — SODIUM CHLORIDE 9 MG/ML
40 INJECTION, SOLUTION INTRAVENOUS AS NEEDED
Status: DISCONTINUED | OUTPATIENT
Start: 2023-03-01 | End: 2023-03-01 | Stop reason: HOSPADM

## 2023-03-01 RX ORDER — LOSARTAN POTASSIUM 25 MG/1
25 TABLET ORAL
Status: DISCONTINUED | OUTPATIENT
Start: 2023-03-02 | End: 2023-03-03 | Stop reason: HOSPADM

## 2023-03-01 RX ORDER — BUPIVACAINE HYDROCHLORIDE 2.5 MG/ML
INJECTION, SOLUTION EPIDURAL; INFILTRATION; INTRACAUDAL
Status: COMPLETED | OUTPATIENT
Start: 2023-03-01 | End: 2023-03-01

## 2023-03-01 RX ORDER — LABETALOL HYDROCHLORIDE 5 MG/ML
5 INJECTION, SOLUTION INTRAVENOUS
Status: DISCONTINUED | OUTPATIENT
Start: 2023-03-01 | End: 2023-03-01

## 2023-03-01 RX ORDER — SODIUM CHLORIDE 0.9 % (FLUSH) 0.9 %
3-10 SYRINGE (ML) INJECTION AS NEEDED
Status: DISCONTINUED | OUTPATIENT
Start: 2023-03-01 | End: 2023-03-01

## 2023-03-01 RX ORDER — PROMETHAZINE HYDROCHLORIDE 25 MG/1
25 TABLET ORAL ONCE AS NEEDED
Status: DISCONTINUED | OUTPATIENT
Start: 2023-03-01 | End: 2023-03-01

## 2023-03-01 RX ORDER — HYDROCODONE BITARTRATE AND ACETAMINOPHEN 5; 325 MG/1; MG/1
1 TABLET ORAL ONCE AS NEEDED
Status: DISCONTINUED | OUTPATIENT
Start: 2023-03-01 | End: 2023-03-01

## 2023-03-01 RX ORDER — DROPERIDOL 2.5 MG/ML
0.62 INJECTION, SOLUTION INTRAMUSCULAR; INTRAVENOUS ONCE AS NEEDED
Status: DISCONTINUED | OUTPATIENT
Start: 2023-03-01 | End: 2023-03-01

## 2023-03-01 RX ORDER — PROPOFOL 10 MG/ML
VIAL (ML) INTRAVENOUS AS NEEDED
Status: DISCONTINUED | OUTPATIENT
Start: 2023-03-01 | End: 2023-03-01 | Stop reason: SURG

## 2023-03-01 RX ORDER — LORAZEPAM 2 MG/ML
0.5 INJECTION INTRAMUSCULAR
Status: DISCONTINUED | OUTPATIENT
Start: 2023-03-01 | End: 2023-03-03 | Stop reason: HOSPADM

## 2023-03-01 RX ORDER — DEXAMETHASONE SODIUM PHOSPHATE 4 MG/ML
INJECTION, SOLUTION INTRA-ARTICULAR; INTRALESIONAL; INTRAMUSCULAR; INTRAVENOUS; SOFT TISSUE AS NEEDED
Status: DISCONTINUED | OUTPATIENT
Start: 2023-03-01 | End: 2023-03-01 | Stop reason: SURG

## 2023-03-01 RX ORDER — MIDAZOLAM HYDROCHLORIDE 1 MG/ML
0.5 INJECTION INTRAMUSCULAR; INTRAVENOUS
Status: DISCONTINUED | OUTPATIENT
Start: 2023-03-01 | End: 2023-03-01 | Stop reason: HOSPADM

## 2023-03-01 RX ORDER — FENTANYL CITRATE 50 UG/ML
50 INJECTION, SOLUTION INTRAMUSCULAR; INTRAVENOUS
Status: DISCONTINUED | OUTPATIENT
Start: 2023-03-01 | End: 2023-03-01

## 2023-03-01 RX ORDER — EPHEDRINE SULFATE 50 MG/ML
INJECTION INTRAVENOUS AS NEEDED
Status: DISCONTINUED | OUTPATIENT
Start: 2023-03-01 | End: 2023-03-01 | Stop reason: SURG

## 2023-03-01 RX ORDER — SODIUM CHLORIDE 0.9 % (FLUSH) 0.9 %
10 SYRINGE (ML) INJECTION EVERY 12 HOURS SCHEDULED
Status: DISCONTINUED | OUTPATIENT
Start: 2023-03-01 | End: 2023-03-01 | Stop reason: HOSPADM

## 2023-03-01 RX ORDER — FAMOTIDINE 20 MG/1
20 TABLET, FILM COATED ORAL ONCE
Status: COMPLETED | OUTPATIENT
Start: 2023-03-01 | End: 2023-03-01

## 2023-03-01 RX ORDER — HYDROCODONE BITARTRATE AND ACETAMINOPHEN 5; 325 MG/1; MG/1
1 TABLET ORAL EVERY 8 HOURS PRN
Status: DISCONTINUED | OUTPATIENT
Start: 2023-03-01 | End: 2023-03-03 | Stop reason: HOSPADM

## 2023-03-01 RX ORDER — DEXAMETHASONE SODIUM PHOSPHATE 10 MG/ML
INJECTION, SOLUTION INTRAMUSCULAR; INTRAVENOUS
Status: COMPLETED | OUTPATIENT
Start: 2023-03-01 | End: 2023-03-01

## 2023-03-01 RX ORDER — NALOXONE HCL 0.4 MG/ML
0.4 VIAL (ML) INJECTION AS NEEDED
Status: DISCONTINUED | OUTPATIENT
Start: 2023-03-01 | End: 2023-03-01

## 2023-03-01 RX ORDER — POTASSIUM CHLORIDE 750 MG/1
40 CAPSULE, EXTENDED RELEASE ORAL EVERY 4 HOURS
Status: DISPENSED | OUTPATIENT
Start: 2023-03-01 | End: 2023-03-01

## 2023-03-01 RX ORDER — MAGNESIUM SULFATE HEPTAHYDRATE 40 MG/ML
4 INJECTION, SOLUTION INTRAVENOUS ONCE
Status: COMPLETED | OUTPATIENT
Start: 2023-03-01 | End: 2023-03-01

## 2023-03-01 RX ORDER — SODIUM CHLORIDE 0.9 % (FLUSH) 0.9 %
10 SYRINGE (ML) INJECTION AS NEEDED
Status: DISCONTINUED | OUTPATIENT
Start: 2023-03-01 | End: 2023-03-01 | Stop reason: HOSPADM

## 2023-03-01 RX ADMIN — ENOXAPARIN SODIUM 40 MG: 40 INJECTION SUBCUTANEOUS at 20:39

## 2023-03-01 RX ADMIN — POTASSIUM CHLORIDE 40 MEQ: 750 CAPSULE, EXTENDED RELEASE ORAL at 08:21

## 2023-03-01 RX ADMIN — SODIUM CHLORIDE 50 ML/HR: 4.5 INJECTION, SOLUTION INTRAVENOUS at 16:53

## 2023-03-01 RX ADMIN — EPHEDRINE SULFATE 5 MG: 50 INJECTION INTRAVENOUS at 13:56

## 2023-03-01 RX ADMIN — EPHEDRINE SULFATE 10 MG: 50 INJECTION INTRAVENOUS at 13:54

## 2023-03-01 RX ADMIN — Medication 100 MCG: at 13:56

## 2023-03-01 RX ADMIN — TAZOBACTAM SODIUM AND PIPERACILLIN SODIUM 3.38 G: 375; 3 INJECTION, SOLUTION INTRAVENOUS at 03:48

## 2023-03-01 RX ADMIN — TAZOBACTAM SODIUM AND PIPERACILLIN SODIUM 3.38 G: 375; 3 INJECTION, SOLUTION INTRAVENOUS at 10:05

## 2023-03-01 RX ADMIN — Medication 100 MCG: at 14:30

## 2023-03-01 RX ADMIN — MAGNESIUM SULFATE HEPTAHYDRATE 4 G: 40 INJECTION, SOLUTION INTRAVENOUS at 10:05

## 2023-03-01 RX ADMIN — PROPOFOL 150 MG: 10 INJECTION, EMULSION INTRAVENOUS at 13:47

## 2023-03-01 RX ADMIN — ONDANSETRON 4 MG: 2 INJECTION INTRAMUSCULAR; INTRAVENOUS at 14:25

## 2023-03-01 RX ADMIN — DEXAMETHASONE SODIUM PHOSPHATE 2 MG: 10 INJECTION, SOLUTION INTRAMUSCULAR; INTRAVENOUS at 13:11

## 2023-03-01 RX ADMIN — LIDOCAINE HYDROCHLORIDE 50 MG: 10 INJECTION, SOLUTION EPIDURAL; INFILTRATION; INTRACAUDAL; PERINEURAL at 13:47

## 2023-03-01 RX ADMIN — SODIUM CHLORIDE, POTASSIUM CHLORIDE, SODIUM LACTATE AND CALCIUM CHLORIDE 9 ML/HR: 600; 310; 30; 20 INJECTION, SOLUTION INTRAVENOUS at 12:55

## 2023-03-01 RX ADMIN — SENNOSIDES AND DOCUSATE SODIUM 2 TABLET: 8.6; 5 TABLET ORAL at 08:21

## 2023-03-01 RX ADMIN — TAZOBACTAM SODIUM AND PIPERACILLIN SODIUM 3.38 G: 375; 3 INJECTION, SOLUTION INTRAVENOUS at 16:53

## 2023-03-01 RX ADMIN — MAGNESIUM OXIDE 400 MG (241.3 MG MAGNESIUM) TABLET 400 MG: TABLET at 08:21

## 2023-03-01 RX ADMIN — SENNOSIDES AND DOCUSATE SODIUM 2 TABLET: 8.6; 5 TABLET ORAL at 20:38

## 2023-03-01 RX ADMIN — ATORVASTATIN CALCIUM 80 MG: 40 TABLET, FILM COATED ORAL at 20:39

## 2023-03-01 RX ADMIN — VANCOMYCIN HYDROCHLORIDE 1500 MG: 10 INJECTION, POWDER, LYOPHILIZED, FOR SOLUTION INTRAVENOUS at 08:21

## 2023-03-01 RX ADMIN — VANCOMYCIN HYDROCHLORIDE 1250 MG: 10 INJECTION, POWDER, LYOPHILIZED, FOR SOLUTION INTRAVENOUS at 20:40

## 2023-03-01 RX ADMIN — DEXAMETHASONE SODIUM PHOSPHATE 4 MG: 4 INJECTION, SOLUTION INTRAMUSCULAR; INTRAVENOUS at 13:51

## 2023-03-01 RX ADMIN — FENTANYL CITRATE 50 MCG: 50 INJECTION, SOLUTION INTRAMUSCULAR; INTRAVENOUS at 13:47

## 2023-03-01 RX ADMIN — EPHEDRINE SULFATE 5 MG: 50 INJECTION INTRAVENOUS at 14:30

## 2023-03-01 RX ADMIN — FENTANYL CITRATE 50 MCG: 50 INJECTION, SOLUTION INTRAMUSCULAR; INTRAVENOUS at 14:05

## 2023-03-01 RX ADMIN — PROPOFOL 50 MG: 10 INJECTION, EMULSION INTRAVENOUS at 14:05

## 2023-03-01 RX ADMIN — BUPIVACAINE HYDROCHLORIDE 30 ML: 2.5 INJECTION, SOLUTION EPIDURAL; INFILTRATION; INTRACAUDAL; PERINEURAL at 13:11

## 2023-03-01 RX ADMIN — FAMOTIDINE 20 MG: 20 TABLET ORAL at 12:59

## 2023-03-01 RX ADMIN — Medication 10 ML: at 20:40

## 2023-03-01 NOTE — ANESTHESIA PROCEDURE NOTES
Airway  Urgency: elective    Date/Time: 3/1/2023 1:48 PM  Airway not difficult    General Information and Staff    Patient location during procedure: OR  CRNA/CAA: Viviane Yoo CRNA    Indications and Patient Condition  Indications for airway management: airway protection    Preoxygenated: yes  MILS maintained throughout  Mask difficulty assessment: 2 - vent by mask + OA or adjuvant +/- NMBA    Final Airway Details  Final airway type: supraglottic airway      Successful airway: I-gel  Size 5     Number of attempts at approach: 1  Assessment: lips, teeth, and gum same as pre-op and atraumatic intubation    Additional Comments  Symmetrical chest rise and fall, +ETCO2, dentition and lips unchanged, head and neck neutral PPP'd

## 2023-03-01 NOTE — PROGRESS NOTES
Cardiothoracic Surgery Progress Note      POD #:     LOS: 1 day      Subjective: Amputation of right great toe planned for later today.  Discussed risk and benefits of the surgery with the patient and his former wife.  Risk of bleeding, stroke, heart attack, death, postop phantom pain, possible infection or nonhealing at the amputation site requiring amputation a at a higher level in the future.  Patient and former wife agrees for me to proceed ahead    Objective:  Vital Signs  Temp:  [97 °F (36.1 °C)-97.9 °F (36.6 °C)] 97 °F (36.1 °C)  Heart Rate:  [53-87] 87  Resp:  [16-18] 18  BP: (105-115)/(75-83) 105/75    Physical Exam:   General Appearance:    Lungs:   Heart:   Skin:   Incision:     Results:  Results from last 7 days   Lab Units 02/28/23  0612   WBC 10*3/mm3 5.23   HEMOGLOBIN g/dL 11.8*   HEMATOCRIT % 34.1*   PLATELETS 10*3/mm3 128*     Results from last 7 days   Lab Units 02/28/23  0612   SODIUM mmol/L 142   POTASSIUM mmol/L 3.7   CHLORIDE mmol/L 105   CO2 mmol/L 27.0   BUN mg/dL 7*   CREATININE mg/dL 0.62*   GLUCOSE mg/dL 79   CALCIUM mg/dL 8.6   MRI reviewed with Dr. Dae Cao of  radiology patient has findings of osteomyelitis of the distal as well as the proximal phalangeal bone of the right great toe this most likely will require a transmetatarsal amputation      Assessment: #1.  Osteomyelitis of the distal and proximal phalangeal bone of the right great toe secondary to pressure ulceration  #2.  Ongoing tobacco abuse  3.  Status post remote abdominal aortic aneurysm repair per Dr. Palencia over 10 years ago    Plan: Probable transmetatarsal amputation of the right great toe planned for later today.  Overall medical manage per hospitalist.  Antibiotics per infectious disease.      Francisco Ramirez MD - 03/01/23 - 05:21 EST
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
## 2023-03-01 NOTE — PLAN OF CARE
Goal Outcome Evaluation:  Plan of Care Reviewed With: patient        Progress: improving  Outcome Evaluation: patiemt alert amd oriented, complainso f backpain but refuses tylenol heating pad provided, skiun precautions in effect patient turns independently heels elevated off bed, patient not eating well but drinking premier protein supplements, wound cleaned with iodine, walks with stand by scheduled for surgery tomorrow, consent signed and on chart

## 2023-03-01 NOTE — ANESTHESIA PROCEDURE NOTES
Popliteal SS      Patient reassessed immediately prior to procedure    Patient location during procedure: pre-op  Reason for block: at surgeon's request and post-op pain management  Performed by  CRNA/CAA: Fawad Arana, CRNA  Assisted by: Rylie Chirinos RN  Preanesthetic Checklist  Completed: patient identified, IV checked, site marked, risks and benefits discussed, surgical consent, monitors and equipment checked, pre-op evaluation and timeout performed  Prep:  Sterile barriers:cap, gloves, mask and washed/disinfected hands  Prep: ChloraPrep  Patient monitoring: blood pressure monitoring, continuous pulse oximetry and EKG  Procedure    Sedation: yes  Performed under: local infiltration  Guidance:ultrasound guided    ULTRASOUND INTERPRETATION.  Using ultrasound guidance a 20 G gauge needle was placed in close proximity to the nerve, at which point, under ultrasound guidance anesthetic was injected in the area of the nerve and spread of the anesthesia was seen on ultrasound in close proximity thereto.  There were no abnormalities seen on ultrasound; a digital image was taken; and the patient tolerated the procedure with no complications. Images:still images obtained, printed/placed on chart    Laterality:right  Block Type:popliteal  Injection Technique:single-shot  Needle Type:echogenic and Tuohy  Needle Gauge:18 G  Resistance on Injection: none  Catheter Size:20 G    Medications Used: dexamethasone sodium phosphate injection - Injection   2 mg - 3/1/2023 1:11:00 PM  bupivacaine PF (MARCAINE) 0.25 % injection - Injection   30 mL - 3/1/2023 1:11:00 PM      Post Assessment  Injection Assessment: negative aspiration for heme, no paresthesia on injection and incremental injection  Patient Tolerance:comfortable throughout block  Complications:no  Additional Notes  SINGLE shot    A high-frequency linear transducer, with sterile cover, was placed in the popliteal fossa to identify the popliteal artery and vein, Tibial  "nerve (TN) and Common Peroneal nerve (CP). The transducer was then moved in a cephalad fashion to observe the TN and CP nerve bifurcation to form the Sciatic Nerve. The insertion site was prepped and draped in sterile fashion. Skin and cutaneous tissue was infiltrated with 2-5 ml of 1% Lidocaine. Using ultrasound-guidance, a 20-gauge B-Molina 4\" Ultraplex 360 non-stimulating echogenic needle was then inserted and advanced in plane from lateral to medial. Preservative-free normal saline was utilized for hydro-dissection of tissue, advancement of Touhy, and to confirm final needle placement posterior to the nerves. Local anesthetic injection spread, in incremental 3-5 ml injections, to surround both nerve structures. Aspiration every 5 ml to prevent intravascular injection. Injection was completed with negative aspiration of blood and negative intravascular injection. Injection pressures were normal with minimal resistance            "

## 2023-03-01 NOTE — PLAN OF CARE
Goal Outcome Evaluation:  Plan of Care Reviewed With: patient           Outcome Evaluation: Patient AAOx4, denies pain to Right great toe, NPO since midnight for surgery, bilateral heels elevated off bed, ex-spouse remains at beside, NAD noted.

## 2023-03-01 NOTE — PROGRESS NOTES
[facility] Medicine Services  PROGRESS NOTE    Patient Name: Stephane Noe  : 1950  MRN: [id number]    Date of Admission: 2023  Primary Care Physician: Nain Morel MD    Subjective   Subjective     CC:  Great toe ulceration    HPI:  No new issues. No n/v/d. No fever    ROS:  No f/c  No palpitations  No cp    Objective   Objective     Vital Signs:   Temp:  [97 °F (36.1 °C)-97.9 °F (36.6 °C)] 97.4 °F (36.3 °C)  Heart Rate:  [54-87] 69  Resp:  [16-18] 16  BP: ()/(68-83) 97/68  Flow (L/min):  [2] 2     Physical Exam:  Constitutional:Alert, oriented x 3, nontoxic appearing, nasal canula in place  Psych:Normal/appropriate affect  HEENT:NCAT, oropharynx clear  Neck: neck supple, full range of motion  Neuro: Face symmetric, speech clear, equal , moves all extremities  Cardiac: rrr; No pretibial pitting edema  Resp: ctab, normal effort, 2Lnc in place  GI: abd soft, nontender  Skin: right great toe medial ulceration ~1 inch unchanged  Musculoskeletal/extremities: no cyanosis of extremities; no significant ankle edema            Results Reviewed:  LAB RESULTS:      Lab 23  0506 23  1844   WBC 5.04 5.23 4.30   HEMOGLOBIN 12.6* 11.8* 13.6   HEMATOCRIT 36.2* 34.1* 39.1   PLATELETS 122* 128* 153   NEUTROS ABS  --   --  3.26   IMMATURE GRANS (ABS)  --   --  0.01   LYMPHS ABS  --   --  0.57*   MONOS ABS  --   --  0.41   EOS ABS  --   --  0.02   .2* 112.5* 112.4*   SED RATE  --   --  21*   CRP  --   --  1.12*   PROCALCITONIN  --   --  0.07   LACTATE  --   --  1.3   D DIMER QUANT  --   --  3.45*         Lab 23  0506 23  0612 23  1844   SODIUM 142 142 140   POTASSIUM 3.6 3.7 3.7   CHLORIDE 103 105 100   CO2 29.0 27.0 29.0   ANION GAP 10.0 10.0 11.0   BUN 7* 7* 7*   CREATININE 0.49* 0.62* 0.68*   EGFR 108.4 100.9 98.1   GLUCOSE 83 79 90   CALCIUM 8.7 8.6 9.2   MAGNESIUM 1.6 2.3  --    HEMOGLOBIN A1C  --   --  5.00         Lab  02/27/23  1844   TOTAL PROTEIN 7.0   ALBUMIN 3.9   GLOBULIN 3.1   ALT (SGPT) 19   AST (SGOT) 34   BILIRUBIN 0.9   ALK PHOS 77         Lab 02/27/23 2041 02/27/23  1844   PROBNP  --  946.1*   HSTROP T 18* 19*             Lab 02/28/23  1100   ABO TYPING A   RH TYPING Positive   ANTIBODY SCREEN Negative         Brief Urine Lab Results  (Last result in the past 365 days)      Color   Clarity   Blood   Leuk Est   Nitrite   Protein   CREAT   Urine HCG        01/06/23 1036 Yellow   Clear   Negative   Negative   Negative   Negative                 Microbiology Results Abnormal     Procedure Component Value - Date/Time    Blood Culture - Blood, Arm, Right [[id number]]  (Normal) Collected: 02/27/23 1638    Lab Status: Preliminary result Specimen: Blood from Arm, Right Updated: 02/28/23 1946     Blood Culture No growth at 24 hours    Blood Culture - Blood, Arm, Right [[id number]]  (Normal) Collected: 02/27/23 1856    Lab Status: Preliminary result Specimen: Blood from Arm, Right Updated: 02/28/23 1916     Blood Culture No growth at 24 hours    MRSA Screen, PCR (Inpatient) - Swab, Nares [[id number]]  (Normal) Collected: 02/28/23 1632    Lab Status: Final result Specimen: Swab from Nares Updated: 02/28/23 1752     MRSA PCR Negative    Narrative:      The negative predictive value of this diagnostic test is high and should only be used to consider de-escalating anti-MRSA therapy. A positive result may indicate colonization with MRSA and must be correlated clinically.  MRSA Negative          XR Toe 2+ View Right    Result Date: 2/27/2023  XR TOE 2+ VW RIGHT Date of Exam: 2/27/2023 6:55 PM EST Indication: right great toe ulcer. Comparison: None available. Findings: Study is limited by overlying artifact from bandaging. There is diffuse soft tissue swelling over the great toe. No definite disruption of the cortex noted given limitations of the exam. Question osteopenia versus artifact at the distal aspect ventrally of the distal  phalanx     Impression: Impression: Limited study secondary to overlying artifact Diffuse soft tissue swelling Questionable osteopenia versus artifact of the distal phalanx. No disruption of the cortex identified on limited imaging Electronically Signed: Fermin Madison  2/27/2023 7:37 PM EST  Workstation ID: [id number]    MRI Foot Right With & Without Contrast    Result Date: 2/28/2023  MRI FOOT RIGHT W WO CONTRAST Date of Exam: 2/28/2023 3:01 PM EST Indication: Osteomyelitis, foot.  Comparison: None available. Technique:  Routine multiplanar/multisequence sequence images of the right foot were obtained before and after the uneventful administration of  15 mL Multihance.  Findings: There is a wound or ulceration at the medial aspect of the great toe. Edema and enhancement are seen throughout the underlying subcutaneous soft tissues along the medial aspect of the great toe and extending circumferentially throughout the great toe. There is also extension towards the base of the toe. The findings suggest changes of soft tissue cellulitis. No well-defined fluid collection is seen to suggest abscess. There is abnormal edema and enhancement within the distal phalanx of the great toe indicating changes of osteomyelitis. No definitive additional sites of osteomyelitis are identified.     Impression: Impression: 1.There is a wound or ulceration at the medial aspect of the great toe. Edema and enhancement are seen throughout the soft tissues indicating soft tissue cellulitis. No definitive abscess is seen. 2.Evidence for osteomyelitis involving the distal phalanx of the great toe. Electronically Signed: José Rivers  2/28/2023 3:26 PM EST  Workstation ID: [id number]          Current medications:  Scheduled Meds:[START ON 3/3/2023] Pharmacy Consult, , Does not apply, Once  aspirin, 81 mg, Oral, Daily  atorvastatin, 80 mg, Oral, Nightly  enoxaparin, 40 mg, Subcutaneous, Q24H  insulin lispro, 0-7 Units, Subcutaneous, TID  AC  [START ON 3/2/2023] losartan, 25 mg, Oral, Q24H  magnesium oxide, 400 mg, Oral, Daily  magnesium sulfate, 4 g, Intravenous, Once  metoprolol succinate XL, 50 mg, Oral, Daily  piperacillin-tazobactam, 3.375 g, Intravenous, Q8H  potassium chloride, 40 mEq, Oral, Q4H  senna-docusate sodium, 2 tablet, Oral, BID  sodium chloride, 10 mL, Intravenous, Q12H  vancomycin, 1,250 mg, Intravenous, Q12H      Continuous Infusions:Pharmacy to dose vancomycin,       PRN Meds:.•  acetaminophen **OR** acetaminophen **OR** acetaminophen  •  albuterol  •  senna-docusate sodium **AND** polyethylene glycol **AND** bisacodyl **AND** bisacodyl  •  calcium carbonate  •  Calcium Replacement - Initiate Nurse / BPA Driven Protocol  •  dextrose  •  dextrose  •  glucagon (human recombinant)  •  ipratropium-albuterol  •  Magnesium Standard Dose Replacement - Initiate Nurse / BPA Driven Protocol  •  meclizine  •  ondansetron **OR** ondansetron  •  Pharmacy to dose vancomycin  •  Phosphorus Replacement - Initiate Nurse / BPA Driven Protocol  •  Potassium Replacement - Initiate Nurse / BPA Driven Protocol  •  sodium chloride  •  sodium chloride    Assessment & Plan   Assessment & Plan     Active Hospital Problems    Diagnosis  POA   • **Osteomyelitis of right great toe [M86.9]  Yes   • Ulcer of great toe (HCC) [L97.509]  Yes   • Peripheral vascular disease of lower extremity with ulceration (HCC) [I73.9, L97.909]  Unknown   • Atrial fibrillation (HCC) [I48.91]  Yes   • COPD, severe (HCC) [J44.9]  Yes   • Type 2 diabetes mellitus with diabetic polyneuropathy (HCC) [E11.42]  Yes   • Peripheral vascular disease (HCC) [I73.9]  Unknown      Resolved Hospital Problems   No resolved problems to display.        Brief Hospital Course to date:  Stephane Noe is a 72 y.o. male w/ hx lung cancer, copd oral cancer, prostate cancer, cad, hth, hl, dm2 who was directly admitted from ct/vascular surgery office due to concerns for right great toe necrotic  ulceration and evaluation for possible amputation. Dr. Ramirez was consulted    Right great toe necrotic ulceration w/ osteomyelitis  PAD, w/ Hx remote AAA repair (>10 years ago)  -continue zosyn & vanc empiric rx  -mri right foot: osteomyelitis distal phalanx great toe  -ID following: continue vanc & zosyn for now  -Dr. Ramirez following: planning to take to OR for amputation right great toe later today    DM2 (a1c 5.0), well controlled  -continue sliding scale humalog, titrate insulins prn    CAD  HTN  HL  -[facility] 10/2017: 50% mid circumflex stenosis, normal LV function (ef 60%)  -have been holding home amlodipine due to marginal bp  -3/1/23: sbp ~110. Hold losartan. Holding amlodipine. Continue toprol xL 50mg & statin. Add back amlodipine and losartan and bp allows    Daily etoh use  -nurse informed me via [employer] chat that patient's ex-wife has reported patient uses whiskey daily  -will put in prn CIWA protocol    Hx oral cancer  -s/p treatment, in remission per patient    Hx lung cancer   -s/p xrt right lung, per patient in remission    Hx prostate cancer  -s/p treatment    Am labs: cbc,bmp      Expected Discharge Location and Transportation: home  Expected Discharge ? 3/2 or 3/3 (depending on clinical course following toe amputation and when ok w/ ID and Dr. Ramirez)  Expected Discharge Date and Time     Expected Discharge Date Expected Discharge Time    Mar 6, 2023            DVT prophylaxis:  Medical and mechanical DVT prophylaxis orders are present. sq lovenox    AM-PAC 6 Clicks Score (PT): 21 (02/28/23 0800)    CODE STATUS:   Code Status and Medical Interventions:   Ordered at: 02/27/23 0395     Level Of Support Discussed With:    Patient     Code Status (Patient has no pulse and is not breathing):    CPR (Attempt to Resuscitate)     Medical Interventions (Patient has pulse or is breathing):    Full Support       Olaf Morel MD  03/01/23

## 2023-03-01 NOTE — PROGRESS NOTES
Pharmacy Consult-Vancomycin Dosing  Stephane Noe is a  72 y.o. male receiving vancomycin therapy.     Indication: Skin and soft tissue infection  Consulting Provider: Hospitalist  ID Consult: No    Goal AUC: 400 - 600 mg/L*hr    Current Antimicrobial Therapy  Anti-Infectives (From admission, onward)      Ordered     Dose/Rate Route Frequency Start Stop    03/01/23 0738  vancomycin 1250 mg/250 mL 0.9% NS IVPB ([facility])        Ordering Provider: Rudy Colón PharmD    1,250 mg  over 90 Minutes Intravenous Every 12 Hours 03/01/23 2100 03/06/23 2059 03/01/23 0738  vancomycin 1500 mg/500 mL 0.9% NS IVPB ([facility])        Ordering Provider: Rudy Colón PharmD    1,500 mg Intravenous Once 03/01/23 0830      02/27/23 1756  piperacillin-tazobactam (ZOSYN) 3.375 g in iso-osmotic dextrose 50 ml (premix)        Ordering Provider: Ladonna Gaspar APRN    3.375 g  over 4 Hours Intravenous Every 8 Hours 02/28/23 0100 03/07/23 0059    02/27/23 1808  vancomycin 1750 mg/500 mL 0.9% NS IVPB ([facility])        Ordering Provider: Ladonna Gaspar APRN    20 mg/kg × 90.7 kg  over 120 Minutes Intravenous Once 02/27/23 2000 02/27/23 2232 02/27/23 1756  piperacillin-tazobactam (ZOSYN) 3.375 g in iso-osmotic dextrose 50 ml (premix)        Ordering Provider: Ladonna Gaspar APRN    3.375 g  over 30 Minutes Intravenous Once 02/27/23 1845 02/27/23 2006 02/27/23 1756  Pharmacy to dose vancomycin        Ordering Provider: Ladonna Gaspar APRN     Does not apply Continuous PRN 02/27/23 1754 03/06/23 1753            Allergies  Allergies as of 02/27/2023 - Reviewed 02/27/2023   Allergen Reaction Noted    Lisinopril Cough 10/27/2022       Labs    Results from last 7 days   Lab Units 03/01/23  0506 02/28/23  0612 02/27/23  1844   BUN mg/dL 7* 7* 7*   CREATININE mg/dL 0.49* 0.62* 0.68*       Results from last 7 days   Lab Units 03/01/23  0506 02/28/23  0612 02/27/23  1844   WBC 10*3/mm3 5.04 5.23 4.30       Evaluation of Dosing     Last Dose  "Received in the ED/Outside Facility: None  Is Patient on Dialysis or Renal Replacement: No    Ht - 182.9 cm (72\")  Wt - 90.7 kg (200 lb)    Estimated Creatinine Clearance: 172.2 mL/min (A) (by C-G formula based on SCr of 0.49 mg/dL (L)).    Intake & Output (last 3 days)         02/26 0701 02/27 0700 02/27 0701 02/28 0700 02/28 0701 03/01 0700 03/01 0701 03/02 0700    P.O.  300 597     IV Piggyback   250     Total Intake(mL/kg)  300 (3.3) 847 (9.3)     Urine (mL/kg/hr)  800 775 (0.4)     Total Output  800 775     Net  -500 +72                     Microbiology and Radiology  Microbiology Results (last 10 days)       Procedure Component Value - Date/Time    MRSA Screen, PCR (Inpatient) - Swab, Nares [[id number]]  (Normal) Collected: 02/28/23 1632    Lab Status: Final result Specimen: Swab from Nares Updated: 02/28/23 1752     MRSA PCR Negative    Narrative:      The negative predictive value of this diagnostic test is high and should only be used to consider de-escalating anti-MRSA therapy. A positive result may indicate colonization with MRSA and must be correlated clinically.  MRSA Negative    Blood Culture - Blood, Arm, Right [[id number]]  (Normal) Collected: 02/27/23 1856    Lab Status: Preliminary result Specimen: Blood from Arm, Right Updated: 02/28/23 1916     Blood Culture No growth at 24 hours    Blood Culture - Blood, Arm, Right [[id number]]  (Normal) Collected: 02/27/23 1638    Lab Status: Preliminary result Specimen: Blood from Arm, Right Updated: 02/28/23 1946     Blood Culture No growth at 24 hours            Reported Vancomycin Levels    Results from last 7 days   Lab Units 03/01/23  0506   VANCOMYCIN RM mcg/mL 12.40                      [employer] AUC Calculation:    Current AUC:   343 mg/L*hr    Predicted Steady State AUC on Current Dose: 346 mg/L*hr  _________________________________    Predicted Steady State AUC on New Dose:   431 mg/L*hr    Assessment/Plan:  Pharmacy to dose vancomycin for SSTI.  " Goal AUC of 400-600 mg/L*hr.  Will slightly increase dose today due to current PK parameters.  1500mg IV x 1, and increase dose to 1250mg IV q12h starting tonight at 2100.  Will monitor patient closely; renal function remains stable, but long term toleration of q12h dosing is questionable due to current age.  Level check 3/3 at 0600 if still on therapy.  PK evaluation to follow.  Pharmacy will continue to follow and adjust dose based on renal function, drug levels, and clinical status.    Thanks,  Rudy Colón, PharmD, BCPS, BCCCP  03/01/23  07:42 EST
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
## 2023-03-01 NOTE — OP NOTE
"OP NOTE    Patient Name:  Stephane Noe    Date of Surgery: March 1, 2023      Pre-op Diagnosis: Osteomyelitis of the right great toe distal and proximal phalangeal bones.    Post-op Diagnosis: Same with possible involvement of the proximal phalangeal joint bone back to the metatarsal phalangeal joint space area    Surgeon(s):  Francisco Ramirez MD      Assistant(s): Assistant: Rebecca Angeles PA-C  Assistant: Rebecca Angeles PA-C  was responsible for performing the following activities: Retraction, Suction, Irrigation, Suturing, Closing and Placing Dressing and their skilled assistance was necessary for the success of this case.    Anesthesia: Right popliteal single shot nerve block under ultrasound guidance by anesthesia in the preop area followed by intravenous induction of general LMA anesthesia    Indications: Patient is a longtime smoker 1 pack a day for 55 years who recently developed a \"blister\" on his medial surface of the right great toe.  He did not immediately seek medical attention from this.  He had a remote endovascular repair of abdominal aortic aneurysm by Dr. Palencia over 10 years ago and did well from that.  He finally saw his local physician approxi-4 days ago and was referred on to Dr. Palencia for further evaluation of this blister/ulceration of his right great toe.  Dr. Palencia evaluated the patient in the office 2 days ago and asked for immediate see the patient due to this blister.  I evaluated the patient in the office and felt as though the patient needed admission to the hospital for what clinically was osteomyelitis of this right great toe involving the distal phalanx and may be the proximal phalanx just based on the size and location of the blister.  After admission to the hospitalist service he underwent MRI which showed osteomyelitis of the distal phalanx and at least the distal portion of the proximal phalanx.  He was placed on IV antibiotics per infectious disease and is now for right " great toe amputation.  I talked to the patient and his former wife regarding the risk and benefits of this procedure include bleeding, stroke, heart attack, death, possible postop phantom pain and possible need for amputation a higher in the future due to infection or poor wound healing of the great toe amputation site.  He and his former wife agreed for me to proceed ahead with the surgery at this time.  Procedure(s): Right great toe transmetatarsal amputation through the distal third of the metatarsal bone with primary closure.    The patient underwent a single shot popliteal nerve block under ultrasound guidance in the preop area by anesthesia and then was taken the operating suite underwent intravenous induction of general LMA anesthesia without difficulty.  Timeout was called to verify procedure be done: Right great toe amputation the right foot and right lower extremity to the knee was prepped circumvents with ChloraPrep and applying 3-minute drying time was sterilely draped.  The initial incision of this amputation or creation of a fishmouth skin flaps on the dorsal and plantar surface near the webspace of this right great toe dissection was then carried down to the phalangeal joint space.  Dissection was then carried along the proximal phalangeal bone using the Bovie cautery.  It should be noted that there was excellent arterial digital artery blood flow which was controlled the Bovie cautery the proximal third of the proximal phalangeal bone was then transected with the bone power saw.  It was noted on transection that this area of bone had what appeared to be ischemic cortex with no bleeding encountered.  It was then decided to extend the dorsal flap more proximally in order to take out the distal portion of the metatarsal bone along with the metatarsal head.  This was done with the Bovie cautery and the distal third of the metatarsal bone was then transected with the bone power saw with out difficulty.   It should be noted the cortex of the metatarsal bone was very viable appearing with bleeding from the cortex.  The flexor and extensor tendons were then identified in the open surgical wound and dissected back to the most proximal portion of the surgical incision where they were then brought along extension and transected with the Bovie cautery and allowed to retract into the  depth of surgical wound.  The sesamoid bone apparatus within the plantar deep soft tissues of the open surgical wound was then dissected free with the Bovie cautery and removed from the operative field.  There was excellent arterial bleeding within the deep soft tissues after the sesamoid bone apparatus have been removed.  This open wound was then irrigated with 4 and 50 mL of Irrisept orthopedic antibiotic solution.  After assurance of hemostasis the dorsal and plantar flaps were then reapproximated with interrupted 3-0 Vicryl for the deep soft tissue layer and interrupted simple 2 oh nylons for the skin closure.  The incision was then covered with 6-6 Optifoam gauze followed by 4 x 4 fluffs with 4 x 4 between the toes and a Kerlix wrap to hold the 4 x 4's in place followed by #8 Spandage to mid dressing to hold the Kerlix in place.  Patient was awakened from the general LMA anesthesia and taken to the recovery room with stable vital signs.  Sponge/needle count were correct x2.  A sample of the soft tissue within the area of these skin ulceration was sampled and sent for aerobic anaerobic culture and Gram stain.  The toe with its proximal phalangeal bone was sent for pathology review and separately the distal third of the metatarsal bone along with the metatarsal head was sent for pathologic review.  Estimated Blood Loss: Approximately 10 mL.    Findings: On the initial resection of the proximal phalangeal bone the cortex at this level appeared ischemic with no bleeding encountered within the bone cortex.  Therefore the dorsal flap incision  was extended more proximally in order to do a formal transmetatarsal amputation of the right great toe.  On resection of the metatarsal bone at the distal third there was excellent cortex viability and bleeding encountered.  Complications: No immediate complications occurred.    Francisco Ramirez MD     Date: 3/1/2023 Time: 15:03 EST

## 2023-03-01 NOTE — ANESTHESIA PREPROCEDURE EVALUATION
Anesthesia Evaluation                  Airway   Mallampati: I  TM distance: >3 FB  Neck ROM: full  No difficulty expected  Dental      Pulmonary    (+) lung cancer, COPD,   Cardiovascular     ECG reviewed    (+) CAD, dysrhythmias Atrial Fib, CHF , PVD,       Neuro/Psych  (+) dizziness/light headedness, numbness,    GI/Hepatic/Renal/Endo    (+) obesity,   diabetes mellitus,     Musculoskeletal     Abdominal    Substance History      OB/GYN          Other   arthritis,    history of cancer                    Anesthesia Plan    ASA 4     general     (Blocks as ness)    Anesthetic plan, risks, benefits, and alternatives have been provided, discussed and informed consent has been obtained with: patient.    Plan discussed with CRNA.        CODE STATUS:    Level Of Support Discussed With: Patient  Code Status (Patient has no pulse and is not breathing): CPR (Attempt to Resuscitate)  Medical Interventions (Patient has pulse or is breathing): Full Support

## 2023-03-02 LAB
ANION GAP SERPL CALCULATED.3IONS-SCNC: 10 MMOL/L (ref 5–15)
BASOPHILS # BLD AUTO: 0.01 10*3/MM3 (ref 0–0.2)
BASOPHILS NFR BLD AUTO: 0.1 % (ref 0–1.5)
BUN SERPL-MCNC: 9 MG/DL (ref 8–23)
BUN/CREAT SERPL: 14.5 (ref 7–25)
CALCIUM SPEC-SCNC: 8.5 MG/DL (ref 8.6–10.5)
CHLORIDE SERPL-SCNC: 104 MMOL/L (ref 98–107)
CO2 SERPL-SCNC: 26 MMOL/L (ref 22–29)
CREAT SERPL-MCNC: 0.62 MG/DL (ref 0.76–1.27)
DEPRECATED RDW RBC AUTO: 61.1 FL (ref 37–54)
EGFRCR SERPLBLD CKD-EPI 2021: 100.9 ML/MIN/1.73
EOSINOPHIL # BLD AUTO: 0 10*3/MM3 (ref 0–0.4)
EOSINOPHIL NFR BLD AUTO: 0 % (ref 0.3–6.2)
ERYTHROCYTE [DISTWIDTH] IN BLOOD BY AUTOMATED COUNT: 14.7 % (ref 12.3–15.4)
GLUCOSE BLDC GLUCOMTR-MCNC: 102 MG/DL (ref 70–130)
GLUCOSE BLDC GLUCOMTR-MCNC: 142 MG/DL (ref 70–130)
GLUCOSE BLDC GLUCOMTR-MCNC: 154 MG/DL (ref 70–130)
GLUCOSE SERPL-MCNC: 126 MG/DL (ref 65–99)
HCT VFR BLD AUTO: 33.4 % (ref 37.5–51)
HGB BLD-MCNC: 11.7 G/DL (ref 13–17.7)
IMM GRANULOCYTES # BLD AUTO: 0.05 10*3/MM3 (ref 0–0.05)
IMM GRANULOCYTES NFR BLD AUTO: 0.5 % (ref 0–0.5)
LYMPHOCYTES # BLD AUTO: 0.38 10*3/MM3 (ref 0.7–3.1)
LYMPHOCYTES NFR BLD AUTO: 4.1 % (ref 19.6–45.3)
MAGNESIUM SERPL-MCNC: 2.1 MG/DL (ref 1.6–2.4)
MCH RBC QN AUTO: 39.3 PG (ref 26.6–33)
MCHC RBC AUTO-ENTMCNC: 35 G/DL (ref 31.5–35.7)
MCV RBC AUTO: 112.1 FL (ref 79–97)
MONOCYTES # BLD AUTO: 0.51 10*3/MM3 (ref 0.1–0.9)
MONOCYTES NFR BLD AUTO: 5.5 % (ref 5–12)
NEUTROPHILS NFR BLD AUTO: 8.39 10*3/MM3 (ref 1.7–7)
NEUTROPHILS NFR BLD AUTO: 89.8 % (ref 42.7–76)
NRBC BLD AUTO-RTO: 0 /100 WBC (ref 0–0.2)
PLATELET # BLD AUTO: 128 10*3/MM3 (ref 140–450)
PMV BLD AUTO: 10.9 FL (ref 6–12)
POTASSIUM SERPL-SCNC: 4.2 MMOL/L (ref 3.5–5.2)
RBC # BLD AUTO: 2.98 10*6/MM3 (ref 4.14–5.8)
SODIUM SERPL-SCNC: 140 MMOL/L (ref 136–145)
WBC NRBC COR # BLD: 9.34 10*3/MM3 (ref 3.4–10.8)

## 2023-03-02 PROCEDURE — 82962 GLUCOSE BLOOD TEST: CPT

## 2023-03-02 PROCEDURE — 99232 SBSQ HOSP IP/OBS MODERATE 35: CPT | Performed by: FAMILY MEDICINE

## 2023-03-02 PROCEDURE — 25010000002 VANCOMYCIN 10 G RECONSTITUTED SOLUTION: Performed by: PHYSICIAN ASSISTANT

## 2023-03-02 PROCEDURE — 25010000002 ENOXAPARIN PER 10 MG: Performed by: PHYSICIAN ASSISTANT

## 2023-03-02 PROCEDURE — 25010000002 PIPERACILLIN SOD-TAZOBACTAM PER 1 G: Performed by: PHYSICIAN ASSISTANT

## 2023-03-02 PROCEDURE — 83735 ASSAY OF MAGNESIUM: CPT | Performed by: PHYSICIAN ASSISTANT

## 2023-03-02 PROCEDURE — 99024 POSTOP FOLLOW-UP VISIT: CPT | Performed by: THORACIC SURGERY (CARDIOTHORACIC VASCULAR SURGERY)

## 2023-03-02 PROCEDURE — 80048 BASIC METABOLIC PNL TOTAL CA: CPT | Performed by: PHYSICIAN ASSISTANT

## 2023-03-02 PROCEDURE — 97161 PT EVAL LOW COMPLEX 20 MIN: CPT

## 2023-03-02 PROCEDURE — 85025 COMPLETE CBC W/AUTO DIFF WBC: CPT | Performed by: PHYSICIAN ASSISTANT

## 2023-03-02 PROCEDURE — 97530 THERAPEUTIC ACTIVITIES: CPT

## 2023-03-02 RX ADMIN — SODIUM CHLORIDE 50 ML/HR: 4.5 INJECTION, SOLUTION INTRAVENOUS at 17:22

## 2023-03-02 RX ADMIN — LOSARTAN POTASSIUM 25 MG: 25 TABLET, FILM COATED ORAL at 10:00

## 2023-03-02 RX ADMIN — TAZOBACTAM SODIUM AND PIPERACILLIN SODIUM 3.38 G: 375; 3 INJECTION, SOLUTION INTRAVENOUS at 17:22

## 2023-03-02 RX ADMIN — SENNOSIDES AND DOCUSATE SODIUM 2 TABLET: 8.6; 5 TABLET ORAL at 10:00

## 2023-03-02 RX ADMIN — TAZOBACTAM SODIUM AND PIPERACILLIN SODIUM 3.38 G: 375; 3 INJECTION, SOLUTION INTRAVENOUS at 10:01

## 2023-03-02 RX ADMIN — Medication 10 ML: at 20:03

## 2023-03-02 RX ADMIN — METOPROLOL SUCCINATE 50 MG: 50 TABLET, EXTENDED RELEASE ORAL at 10:00

## 2023-03-02 RX ADMIN — VANCOMYCIN HYDROCHLORIDE 1250 MG: 10 INJECTION, POWDER, LYOPHILIZED, FOR SOLUTION INTRAVENOUS at 10:01

## 2023-03-02 RX ADMIN — Medication 10 ML: at 10:01

## 2023-03-02 RX ADMIN — ASPIRIN 81 MG: 81 TABLET, COATED ORAL at 10:00

## 2023-03-02 RX ADMIN — ENOXAPARIN SODIUM 40 MG: 40 INJECTION SUBCUTANEOUS at 20:02

## 2023-03-02 RX ADMIN — ATORVASTATIN CALCIUM 80 MG: 40 TABLET, FILM COATED ORAL at 20:02

## 2023-03-02 RX ADMIN — MAGNESIUM OXIDE 400 MG (241.3 MG MAGNESIUM) TABLET 400 MG: TABLET at 10:00

## 2023-03-02 RX ADMIN — TAZOBACTAM SODIUM AND PIPERACILLIN SODIUM 3.38 G: 375; 3 INJECTION, SOLUTION INTRAVENOUS at 00:18

## 2023-03-02 RX ADMIN — VANCOMYCIN HYDROCHLORIDE 1250 MG: 10 INJECTION, POWDER, LYOPHILIZED, FOR SOLUTION INTRAVENOUS at 20:03

## 2023-03-02 RX ADMIN — SENNOSIDES AND DOCUSATE SODIUM 2 TABLET: 8.6; 5 TABLET ORAL at 20:02

## 2023-03-02 NOTE — PROGRESS NOTES
[facility] Medicine Services  PROGRESS NOTE    Patient Name: Stephane Noe  : 1950  MRN: [id number]    Date of Admission: 2023  Primary Care Physician: Nain Morel MD    Subjective   Subjective     CC:  Toe amputation    HPI:  Patient is a 72-year-old who underwent right transmetatarsal amputation of the great toe on 3/1/2023.  Surgery was with Dr. Ramirez.  Patient reports feeling fine today and denies complaints.  He understands nonweightbearing on the right foot.  PT and OT consults are pending.  He is tolerating p.o. well.  He is wearing oxygen but states he does not wear oxygen at home.  When offered to send him home with oxygen potentially he declined.  He states he smokes occasionally.    ROS:  Gen- No fevers, chills  CV- No chest pain, palpitations  Resp- No cough, dyspnea  GI- No N/V/D, abd pain         Objective   Objective     Vital Signs:   Temp:  [97.4 °F (36.3 °C)-98.3 °F (36.8 °C)] 97.8 °F (36.6 °C)  Heart Rate:  [] 72  Resp:  [14-18] 18  BP: ()/(58-98) 117/70  Flow (L/min):  [2-6] 2     Physical Exam:  Constitutional: No acute distress, awake, alert  HENT: NCAT, mucous membranes moist  Respiratory: Clear to auscultation bilaterally, respiratory effort normal   Cardiovascular: RRR  Gastrointestinal: Positive bowel sounds, soft, nontender, nondistended  Musculoskeletal: Right foot in postop bandage  Psychiatric: Appropriate affect, cooperative  Neurologic: Oriented x 3, strength symmetric in all extremities, Cranial Nerves grossly intact to confrontation, speech clear  Skin: No rashes      Results Reviewed:  LAB RESULTS:      Lab 23  0736 23  0506 23  0612 23  1844   WBC 9.34 5.04 5.23 4.30   HEMOGLOBIN 11.7* 12.6* 11.8* 13.6   HEMATOCRIT 33.4* 36.2* 34.1* 39.1   PLATELETS 128* 122* 128* 153   NEUTROS ABS 8.39*  --   --  3.26   IMMATURE GRANS (ABS) 0.05  --   --  0.01   LYMPHS ABS 0.38*  --   --  0.57*   MONOS ABS 0.51   --   --  0.41   EOS ABS 0.00  --   --  0.02   .1* 114.2* 112.5* 112.4*   SED RATE  --   --   --  21*   CRP  --   --   --  1.12*   PROCALCITONIN  --   --   --  0.07   LACTATE  --   --   --  1.3   D DIMER QUANT  --   --   --  3.45*         Lab 03/02/23  0715 03/01/23  1802 03/01/23  0506 02/28/23  0612 02/27/23  1844   SODIUM 140  --  142 142 140   POTASSIUM 4.2 4.3 3.6 3.7 3.7   CHLORIDE 104  --  103 105 100   CO2 26.0  --  29.0 27.0 29.0   ANION GAP 10.0  --  10.0 10.0 11.0   BUN 9  --  7* 7* 7*   CREATININE 0.62*  --  0.49* 0.62* 0.68*   EGFR 100.9  --  108.4 100.9 98.1   GLUCOSE 126*  --  83 79 90   CALCIUM 8.5*  --  8.7 8.6 9.2   MAGNESIUM 2.1  --  1.6 2.3  --    HEMOGLOBIN A1C  --   --   --   --  5.00         Lab 02/27/23  1844   TOTAL PROTEIN 7.0   ALBUMIN 3.9   GLOBULIN 3.1   ALT (SGPT) 19   AST (SGOT) 34   BILIRUBIN 0.9   ALK PHOS 77         Lab 02/27/23  2041 02/27/23  1844   PROBNP  --  946.1*   HSTROP T 18* 19*             Lab 02/28/23  1100   ABO TYPING A   RH TYPING Positive   ANTIBODY SCREEN Negative         Brief Urine Lab Results  (Last result in the past 365 days)      Color   Clarity   Blood   Leuk Est   Nitrite   Protein   CREAT   Urine HCG        01/06/23 1036 Yellow   Clear   Negative   Negative   Negative   Negative                 Microbiology Results Abnormal     Procedure Component Value - Date/Time    Blood Culture - Blood, Arm, Right [[id number]]  (Normal) Collected: 02/27/23 1638    Lab Status: Preliminary result Specimen: Blood from Arm, Right Updated: 03/01/23 1931     Blood Culture No growth at 2 days    Blood Culture - Blood, Arm, Right [[id number]]  (Normal) Collected: 02/27/23 1856    Lab Status: Preliminary result Specimen: Blood from Arm, Right Updated: 03/01/23 1916     Blood Culture No growth at 2 days    Tissue / Bone Culture - Tissue, Toe, Right [[id number]] Collected: 03/01/23 1451    Lab Status: Preliminary result Specimen: Tissue from Toe, Right Updated: 03/01/23 3489      Gram Stain Rare (1+) WBCs seen      No organisms seen    MRSA Screen, PCR (Inpatient) - Swab, Nares [[id number]]  (Normal) Collected: 02/28/23 1632    Lab Status: Final result Specimen: Swab from Nares Updated: 02/28/23 1752     MRSA PCR Negative    Narrative:      The negative predictive value of this diagnostic test is high and should only be used to consider de-escalating anti-MRSA therapy. A positive result may indicate colonization with MRSA and must be correlated clinically.  MRSA Negative          MRI Foot Right With & Without Contrast    Result Date: 2/28/2023  MRI FOOT RIGHT W WO CONTRAST Date of Exam: 2/28/2023 3:01 PM EST Indication: Osteomyelitis, foot.  Comparison: None available. Technique:  Routine multiplanar/multisequence sequence images of the right foot were obtained before and after the uneventful administration of  15 mL Multihance.  Findings: There is a wound or ulceration at the medial aspect of the great toe. Edema and enhancement are seen throughout the underlying subcutaneous soft tissues along the medial aspect of the great toe and extending circumferentially throughout the great toe. There is also extension towards the base of the toe. The findings suggest changes of soft tissue cellulitis. No well-defined fluid collection is seen to suggest abscess. There is abnormal edema and enhancement within the distal phalanx of the great toe indicating changes of osteomyelitis. No definitive additional sites of osteomyelitis are identified.     Impression: Impression: 1.There is a wound or ulceration at the medial aspect of the great toe. Edema and enhancement are seen throughout the soft tissues indicating soft tissue cellulitis. No definitive abscess is seen. 2.Evidence for osteomyelitis involving the distal phalanx of the great toe. Electronically Signed: José Rivers  2/28/2023 3:26 PM EST  Workstation ID: [id number]    popliteal SS    Result Date: 3/1/2023  Fawad Arana CRNA     3/1/2023   1:11 PM Popliteal SS Patient reassessed immediately prior to procedure Patient location during procedure: pre-op Reason for block: at surgeon's request and post-op pain management Performed by CRNA/CAA: Fawad Arana, CRNA Assisted by: Rylie Chirinos RN Preanesthetic Checklist Completed: patient identified, IV checked, site marked, risks and benefits discussed, surgical consent, monitors and equipment checked, pre-op evaluation and timeout performed Prep: Sterile barriers:cap, gloves, mask and washed/disinfected hands Prep: ChloraPrep Patient monitoring: blood pressure monitoring, continuous pulse oximetry and EKG Procedure Sedation: yes Performed under: local infiltration Guidance:ultrasound guided ULTRASOUND INTERPRETATION.  Using ultrasound guidance a 20 G gauge needle was placed in close proximity to the nerve, at which point, under ultrasound guidance anesthetic was injected in the area of the nerve and spread of the anesthesia was seen on ultrasound in close proximity thereto.  There were no abnormalities seen on ultrasound; a digital image was taken; and the patient tolerated the procedure with no complications. Images:still images obtained, printed/placed on chart Laterality:right Block Type:popliteal Injection Technique:single-shot Needle Type:echogenic and Tuohy Needle Gauge:18 G Resistance on Injection: none Catheter Size:20 G Medications Used: dexamethasone sodium phosphate injection - Injection  2 mg - 3/1/2023 1:11:00 PM bupivacaine PF (MARCAINE) 0.25 % injection - Injection  30 mL - 3/1/2023 1:11:00 PM Post Assessment Injection Assessment: negative aspiration for heme, no paresthesia on injection and incremental injection Patient Tolerance:comfortable throughout block Complications:no Additional Notes SINGLE shot  A high-frequency linear transducer, with sterile cover, was placed in the popliteal fossa to identify the popliteal artery and vein, Tibial nerve (TN) and Common Peroneal nerve (CP). The  "transducer was then moved in a cephalad fashion to observe the TN and CP nerve bifurcation to form the Sciatic Nerve. The insertion site was prepped and draped in sterile fashion. Skin and cutaneous tissue was infiltrated with 2-5 ml of 1% Lidocaine. Using ultrasound-guidance, a 20-gauge B-Molina 4\" Ultraplex 360 non-stimulating echogenic needle was then inserted and advanced in plane from lateral to medial. Preservative-free normal saline was utilized for hydro-dissection of tissue, advancement of Touhy, and to confirm final needle placement posterior to the nerves. Local anesthetic injection spread, in incremental 3-5 ml injections, to surround both nerve structures. Aspiration every 5 ml to prevent intravascular injection. Injection was completed with negative aspiration of blood and negative intravascular injection. Injection pressures were normal with minimal resistance           Current medications:  Scheduled Meds:[START ON 3/3/2023] Pharmacy Consult, , Does not apply, Once  aspirin, 81 mg, Oral, Daily  atorvastatin, 80 mg, Oral, Nightly  enoxaparin, 40 mg, Subcutaneous, Q24H  insulin lispro, 0-7 Units, Subcutaneous, TID AC  losartan, 25 mg, Oral, Q24H  magnesium oxide, 400 mg, Oral, Daily  metoprolol succinate XL, 50 mg, Oral, Daily  piperacillin-tazobactam, 3.375 g, Intravenous, Q8H  senna-docusate sodium, 2 tablet, Oral, BID  sodium chloride, 10 mL, Intravenous, Q12H  vancomycin, 1,250 mg, Intravenous, Q12H      Continuous Infusions:Pharmacy to dose vancomycin,   sodium chloride, 50 mL/hr, Last Rate: 50 mL/hr (03/01/23 6263)      PRN Meds:.•  acetaminophen **OR** acetaminophen **OR** acetaminophen  •  albuterol  •  senna-docusate sodium **AND** polyethylene glycol **AND** bisacodyl **AND** bisacodyl  •  calcium carbonate  •  Calcium Replacement - Initiate Nurse / BPA Driven Protocol  •  dextrose  •  dextrose  •  glucagon (human recombinant)  •  HYDROcodone-acetaminophen  •  ipratropium-albuterol  •  " LORazepam **OR** LORazepam **OR** LORazepam **OR** LORazepam **OR** LORazepam **OR** LORazepam  •  Magnesium Standard Dose Replacement - Initiate Nurse / BPA Driven Protocol  •  meclizine  •  ondansetron **OR** ondansetron  •  Pharmacy to dose vancomycin  •  Phosphorus Replacement - Initiate Nurse / BPA Driven Protocol  •  Potassium Replacement - Initiate Nurse / BPA Driven Protocol  •  sodium chloride  •  sodium chloride    Assessment & Plan   Assessment & Plan     Active Hospital Problems    Diagnosis  POA   • **Osteomyelitis of right great toe [M86.9]  Yes   • Ulcer of great toe (HCC) [L97.509]  Yes   • Peripheral vascular disease of lower extremity with ulceration (HCC) [I73.9, L97.909]  Unknown   • Atrial fibrillation (HCC) [I48.91]  Yes   • COPD, severe (HCC) [J44.9]  Yes   • Type 2 diabetes mellitus with diabetic polyneuropathy (HCC) [E11.42]  Yes   • Peripheral vascular disease (HCC) [I73.9]  Unknown      Resolved Hospital Problems   No resolved problems to display.        Brief Hospital Course to date:  Stephane Noe is a 72 y.o. male w/ hx lung cancer, copd oral cancer, prostate cancer, cad, hth, hl, dm2 who was directly admitted from ct/vascular surgery office due to concerns for right great toe necrotic ulceration and evaluation for possible amputation. Dr. Ramirez was consulted and he underwent right great toe amputation on 3/1/2023.     Right great toe necrotic ulceration w/ osteomyelitis status post amputation on 3/1/2023  PAD, w/ Hx remote AAA repair (>10 years ago)  -continue zosyn & vanc empiric rx  -mri right foot: osteomyelitis distal phalanx great toe  -ID following: continue vanc & zosyn for now-followed by Dr. Brian Borja  -Dr. Ramirez following for routine postop care  -PT and OT consults pending    DM2 (a1c 5.0), well controlled  -continue sliding scale humalog, titrate insulins prn     CAD  HTN  HL  -[facility] 10/2017: 50% mid circumflex stenosis, normal LV function (ef 60%)  -have been holding  home amlodipine due to marginal bp  -3/1/23: sbp ~110. Hold losartan. Holding amlodipine. Continue toprol xL 50mg & statin. Add back amlodipine and losartan and bp allows     Daily etoh use  -nurse informed me via [employer] chat that patient's ex-wife has reported patient uses whiskey daily  -will put in prn CIWA protocol     Hx oral cancer  -s/p treatment, in remission per patient     Hx lung cancer   -s/p xrt right lung, per patient in remission     Hx prostate cancer  -s/p treatment    COPD  Ongoing tobacco abuse  -Wean O2 as tolerated  -Encourage smoking cessation  -Patient declined being sent home with oxygen at this time.    More than 50% of time spent counseling on current illness and plan of care. Case discussed with: Dr. Brian Borja and Dr. Ramirez  Total time spent face to face with the patient was 10 minutes.  Total time of the encounter was 22 minutes.      Expected Discharge Location and Transportation: Home versus rehab pending PT consult, private vehicle  Expected Discharge 3/6/2023  Expected Discharge Date and Time     Expected Discharge Date Expected Discharge Time    Mar 6, 2023            DVT prophylaxis:  Medical and mechanical DVT prophylaxis orders are present.     AM-PAC 6 Clicks Score (PT): 19 (03/01/23 1948)    CODE STATUS:   Code Status and Medical Interventions:   Ordered at: 02/27/23 8248     Level Of Support Discussed With:    Patient     Code Status (Patient has no pulse and is not breathing):    CPR (Attempt to Resuscitate)     Medical Interventions (Patient has pulse or is breathing):    Full Support       Amy Grant MD  03/02/23

## 2023-03-02 NOTE — PROGRESS NOTES
Cardiothoracic Surgery Progress Note      POD #: 1-transmetatarsal amputation of the right great toe and primary closure     LOS: 2 days      Subjective: Awake and seems alert    Objective:  Vital Signs vital signs below noted Tmax past 24 hours 90.3 °F  Temp:  [97.4 °F (36.3 °C)-98.3 °F (36.8 °C)] 97.5 °F (36.4 °C)  Heart Rate:  [] 81  Resp:  [14-18] 16  BP: ()/(58-98) 109/68    Physical Exam:   General Appearance: Awake and oriented   Lungs:   Heart:   Skin:   Incision: Amputation dressing change.  Suture intact skin margin viable skin flap viable.     Results:  Results from last 7 days   Lab Units 03/01/23  0506   WBC 10*3/mm3 5.04   HEMOGLOBIN g/dL 12.6*   HEMATOCRIT % 36.2*   PLATELETS 10*3/mm3 122*     Results from last 7 days   Lab Units 03/01/23  1802 03/01/23  0506   SODIUM mmol/L  --  142   POTASSIUM mmol/L 4.3 3.6   CHLORIDE mmol/L  --  103   CO2 mmol/L  --  29.0   BUN mg/dL  --  7*   CREATININE mg/dL  --  0.49*   GLUCOSE mg/dL  --  83   CALCIUM mg/dL  --  8.7         Assessment: #1 postop day 1 transmetatarsal amputation right great toe with primary closure  2.  Ongoing tobacco abuse  3.  Status post remote abdominal aortic aneurysm repair by Dr. Palencia over 10 years ago  4.  Alcoholic abuse according to former wife      Plan: Continue daily dressing change amputation site.  Medical manage per hospitalist.  Antibiotics per infectious disease.      Francisco Ramirez MD - 03/02/23 - 05:26 EST
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
## 2023-03-02 NOTE — PLAN OF CARE
Goal Outcome Evaluation:  Plan of Care Reviewed With: patient           Outcome Evaluation: PT initial evaluation completed for pt s/p R great toe amputation presenting with generalized weakness, impaired balance, and decreased functional mobility. Pt ambulated 60ft with Erik and knee scooter. Pt's decreased independence warrants PT skilled care. Recommend D/C to IP rehab facility.

## 2023-03-02 NOTE — PLAN OF CARE
Goal Outcome Evaluation:  Plan of Care Reviewed With: patient        Progress: improving  Outcome Evaluation: patient alert and oriented, denies toe pain but has some back pain from  the bed.  Patient on fall precatuosn will call out for assist, bed alarm in place, patient strict non-weight bearing, needs reminding, skin precautions in effect, postitions self independly but reminded to turn every 2 hours, heels elevated off bed sleeping if undisturbed

## 2023-03-02 NOTE — PLAN OF CARE
Goal Outcome Evaluation:  Plan of Care Reviewed With: patient           Outcome Evaluation: Pt. has slept on and off throughout the shift, dressing remains CD&I to right foot, pt. continues to deny pain/distress, O2 @ 2L NC remains in place, VSS.

## 2023-03-02 NOTE — CASE MANAGEMENT/SOCIAL WORK
Continued Stay Note  [facility]     Patient Name: Stephane Noe  MRN: [id number]  Today's Date: 3/2/2023    Admit Date: 2/27/2023    Plan: ongoing   Discharge Plan     Row Name 03/02/23 1248       Plan    Plan ongoing    Plan Comments Mr. Noe is post op amputation of right great toe.  He is on O2 at 2L per NC which he is not on at home. Awaiting therapy notes to assist with discharge planning.  At present plan is home but may need rehab.    Final Discharge Disposition Code 30 - still a patient               Discharge Codes    No documentation.               Expected Discharge Date and Time     Expected Discharge Date Expected Discharge Time    Mar 6, 2023             Rae Ba RN

## 2023-03-03 VITALS
RESPIRATION RATE: 18 BRPM | TEMPERATURE: 97.9 F | BODY MASS INDEX: 27.09 KG/M2 | SYSTOLIC BLOOD PRESSURE: 128 MMHG | HEART RATE: 91 BPM | HEIGHT: 72 IN | OXYGEN SATURATION: 92 % | DIASTOLIC BLOOD PRESSURE: 86 MMHG | WEIGHT: 200 LBS

## 2023-03-03 PROCEDURE — 25010000002 PIPERACILLIN SOD-TAZOBACTAM PER 1 G: Performed by: PHYSICIAN ASSISTANT

## 2023-03-03 PROCEDURE — 99024 POSTOP FOLLOW-UP VISIT: CPT | Performed by: THORACIC SURGERY (CARDIOTHORACIC VASCULAR SURGERY)

## 2023-03-03 RX ADMIN — TAZOBACTAM SODIUM AND PIPERACILLIN SODIUM 3.38 G: 375; 3 INJECTION, SOLUTION INTRAVENOUS at 00:42

## 2023-03-03 NOTE — PLAN OF CARE
Goal Outcome Evaluation:  Plan of Care Reviewed With: patient           Outcome Evaluation: Patient upset with NWB status, adamant he will leave this morning. Bed alarm alerted staff, patient found standing at bedside, pulled both IV's out and telemetry as well, states he is tired of being here, has called ex-spouse to pick him up, states she will be here later this morning, refuses another IV to placed, states he will let the doctor know he is leaving, will continue to monitor.
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
## 2023-03-03 NOTE — PROGRESS NOTES
Cardiothoracic Surgery Progress Note      POD #: 2-transmetatarsal amputation right great toe with primary closure     LOS: 3 days      Subjective: Awake and alert.  States he is going go home today regardless of whether we want to discharge him or not.    Objective:  Vital Signs vital signs below noted Tmax past 24 hours 97.9 °F  Temp:  [97.8 °F (36.6 °C)-97.9 °F (36.6 °C)] 97.9 °F (36.6 °C)  Heart Rate:  [71-91] 91  Resp:  [18] 18  BP: ()/(68-86) 128/86    Physical Exam:   General Appearance: Awake and alert-as noted above in subjective   Lungs:   Heart:   Skin:   Incision: Amputation site dressing change.  Sutures intact skin margin viable skin flap viable.     Results:  Results from last 7 days   Lab Units 03/02/23  0736   WBC 10*3/mm3 9.34   HEMOGLOBIN g/dL 11.7*   HEMATOCRIT % 33.4*   PLATELETS 10*3/mm3 128*     Results from last 7 days   Lab Units 03/02/23  0715   SODIUM mmol/L 140   POTASSIUM mmol/L 4.2   CHLORIDE mmol/L 104   CO2 mmol/L 26.0   BUN mg/dL 9   CREATININE mg/dL 0.62*   GLUCOSE mg/dL 126*   CALCIUM mg/dL 8.5*         Assessment: #1.  Postop day 2 transmetatarsal amputation right great toe and primary closure healing at this time  2.  Ongoing tobacco abuse  3 status post remote abdominal aortic aneurysm repair by Dr. Palencia over 10 years ago #4 alcoholic abuse according to former wife      Plan: Continue daily dressing the amputation site.  Medical manage per hospitalist.  Antibiotics per infectious disease.  Patient should not go home he should go to rehab facility.  AMA from my concerns if he does not want to follow our plan      Francisco Ramirez MD - 03/03/23 - 05:06 EST

## 2023-03-04 ENCOUNTER — READMISSION MANAGEMENT (OUTPATIENT)
Dept: CALL CENTER | Facility: HOSPITAL | Age: 73
End: 2023-03-04
Payer: MEDICARE

## 2023-03-04 LAB
BACTERIA SPEC AEROBE CULT: ABNORMAL
BACTERIA SPEC AEROBE CULT: ABNORMAL
BACTERIA SPEC AEROBE CULT: NORMAL
BACTERIA SPEC AEROBE CULT: NORMAL
GRAM STN SPEC: ABNORMAL
GRAM STN SPEC: ABNORMAL

## 2023-03-04 NOTE — DISCHARGE SUMMARY
[facility] Medicine Services  ELOPEMENT AGAINST MEDICAL ADVICE    Patient Name: Stephane Noe  : 1950  MRN: [id number]    Date of Admission: 2023  Date of Elopement:  2023  Primary Care Physician: Nain Morel MD    Consults     Date and Time Order Name Status Description    2023  5:38 AM Inpatient Infectious Diseases Consult Completed         Hospital Course     Presenting Problem:   Ischemic ulcer (HCC) [L98.499]  Ulcer of great toe (HCC) [L97.509]    Active Hospital Problems    Diagnosis  POA   • **Osteomyelitis of right great toe [M86.9]  Yes   • Ulcer of great toe (HCC) [L97.509]  Yes   • Peripheral vascular disease of lower extremity with ulceration (HCC) [I73.9, L97.909]  Unknown   • Atrial fibrillation (HCC) [I48.91]  Yes   • COPD, severe (HCC) [J44.9]  Yes   • Type 2 diabetes mellitus with diabetic polyneuropathy (HCC) [E11.42]  Yes   • Peripheral vascular disease (HCC) [I73.9]  Unknown      Resolved Hospital Problems   No resolved problems to display.          Hospital Course:  Stephane Noe is a 72 y.o. male     **Patient left AMA prior to completion of evaluation and management**      Day of Discharge     HPI:   **Patient left AMA prior to completion of evaluation and management**    Vital Signs:   Temp:  [97.8 °F (36.6 °C)-97.9 °F (36.6 °C)] 97.9 °F (36.6 °C)  Heart Rate:  [71-91] 91  Resp:  [18] 18  BP: (118-128)/(82-86) 128/86     Physical Exam (if applicable):  Patient left before being examined or seen by provider   Pending Labs     Order Current Status    Anaerobic Culture - Tissue, Toe, Right In process    Fungus Culture - Tissue, Toe, Right In process    Tissue Pathology Exam In process    AFB Culture - Tissue, Toe, Right Preliminary result    Blood Culture - Blood, Arm, Right Preliminary result    Blood Culture - Blood, Arm, Right Preliminary result    Tissue / Bone Culture - Tissue, Toe, Right Preliminary result        Discharge  Details     Discharge Disposition:  **Patient left AMA prior to completion of evaluation and management, therefore discharge planning remains incomplete including absence of any needed discharging medications, testing arrangements or follow up unless otherwise specified**    Future Appointments   Date Time Provider Department Center   7/5/2023  8:30 AM Nain Morel MD [facility]   7/18/2023  9:00 AM Fatmata Martin, APRN [facility]   1/8/2024  9:00 AM Nain Morel MD [facility] Tabatha Caudill, APRN  03/03/23

## 2023-03-04 NOTE — OUTREACH NOTE
Prep Survey    Flowsheet Row Responses   [facility] patient discharged from? [place]   Is LACE score < 7 ? No   Eligibility Not Eligible   What are the reasons patient is not eligible? Other   Does the patient have one of the following disease processes/diagnoses(primary or secondary)? Other   Prep survey completed? Yes          Lucretia CASTRO - Registered Nurse

## 2023-03-06 DIAGNOSIS — E11.621 ULCER OF RIGHT GREAT TOE DUE TO DIABETES MELLITUS: ICD-10-CM

## 2023-03-06 DIAGNOSIS — L97.519 ULCER OF RIGHT GREAT TOE DUE TO DIABETES MELLITUS: ICD-10-CM

## 2023-03-06 LAB
BACTERIA SPEC ANAEROBE CULT: NORMAL
CYTO UR: NORMAL
LAB AP CASE REPORT: NORMAL
LAB AP CLINICAL INFORMATION: NORMAL
PATH REPORT.FINAL DX SPEC: NORMAL
PATH REPORT.GROSS SPEC: NORMAL

## 2023-03-06 RX ORDER — AMOXICILLIN AND CLAVULANATE POTASSIUM 875; 125 MG/1; MG/1
TABLET, FILM COATED ORAL
Qty: 20 TABLET | Refills: 0 | OUTPATIENT
Start: 2023-03-06

## 2023-03-06 NOTE — TELEPHONE ENCOUNTER
I have denied his rx request for antibiotics. I have left him a voice mail letting him know that he will need an appt for this. TF

## 2023-03-06 NOTE — PROGRESS NOTES
Enter Query Response Below      Query Response: chronic diastolic failure  Electronically signed by Amy Grant MD, 23, 3:22 PM EST.               If applicable, please update the problem list.     Patient: Stephane Noe        : 1950  Account: [id number]           Admit Date: 2023        How to Respond to this query:       a. Click New Note     b. Answer query within the yellow box.                c. Update the Problem List, if applicable.      If you have any questions about this query contact me at: [email or web address]    Dr. Grant:    72 year old male admitted with Osteomyelitis of the right great toe.  History of hypertension and CHF.  Clinical indicators:  proBNP 946.1   H&P physical exam-Cardiovascular: RRR.  Musculoskeletal:  no LE edema bilaterally.  Treatments: Toprol XL  and cozaar.    After study, please further specify:      Chronic diastolic CHF  Other_________________  Unable to determine    By submitting this query, we are merely seeking further clarification of documentation to accurately reflect all conditions that you are monitoring, evaluating, treating or that extend the hospitalization or utilize additional resources of care. Please utilize your independent clinical judgment when addressing the question(s) above.     This query and your response, once completed, will be entered into the legal medical record.    Sincerely,  Smaaria Rivera RN  Clinical Documentation Integrity Program

## 2023-03-06 NOTE — PROGRESS NOTES
Enter Query Response Below      Query Response: Severe malnutrition  Electronically signed by Amy Grant MD, 23, 3:23 PM EST.               If applicable, please update the problem list.     Patient: Stephane Noe        : 1950  Account: [id number]           Admit Date: 2023        How to Respond to this query:       a. Click New Note     b. Answer query within the yellow box.                c. Update the Problem List, if applicable.      If you have any questions about this query contact me at: [email or web address]    Dr. Grant:    72 year old male admitted with Osteomyelitis of the right great toe.  Clinical indicators:  History for lung cancer, oral cancer, prostate cancer.  H&P physical exam-Mucous membranes moist. Musculoskeletal: No bilateral ankle edema.   Registered dietician notes-patient meets criteria for Severe Malnutrition.  Energy intake <75% of estimated energy requirement for greater or equal to one month.  Severe weight loss greater than 5% in one month.  Moderate muscle loss temple, clavicle, dorsal hand, patellar, anterior thigh regions.  Treatments: Registered dietician monitoring and evaluating per protocol- i&o, po intake, supplemental intake, pertinent labs, weight, gi status, symptoms.   Daily Boost Plus Supplements with breakfast and dinner.    After study, please further specify:    Severe malnutrition  Normal body habitus without malnutrition  Other__________________  Unable to determine    By submitting this query, we are merely seeking further clarification of documentation to accurately reflect all conditions that you are monitoring, evaluating, treating or that extend the hospitalization or utilize additional resources of care. Please utilize your independent clinical judgment when addressing the question(s) above.     This query and your response, once completed, will be entered into the legal medical record.    Sincerely,  Samaria Rivera  RN  Clinical Documentation Integrity Program

## 2023-03-22 ENCOUNTER — OFFICE VISIT (OUTPATIENT)
Dept: CARDIAC SURGERY | Facility: CLINIC | Age: 73
End: 2023-03-22
Payer: MEDICARE

## 2023-03-22 VITALS
OXYGEN SATURATION: 98 % | DIASTOLIC BLOOD PRESSURE: 56 MMHG | SYSTOLIC BLOOD PRESSURE: 112 MMHG | TEMPERATURE: 97.5 F | WEIGHT: 193.4 LBS | HEART RATE: 72 BPM | HEIGHT: 72 IN | BODY MASS INDEX: 26.19 KG/M2

## 2023-03-22 DIAGNOSIS — E11.621 ULCER OF RIGHT GREAT TOE DUE TO DIABETES MELLITUS: Primary | ICD-10-CM

## 2023-03-22 DIAGNOSIS — L97.519 ULCER OF RIGHT GREAT TOE DUE TO DIABETES MELLITUS: Primary | ICD-10-CM

## 2023-03-22 DIAGNOSIS — I96 GANGRENE: ICD-10-CM

## 2023-03-22 PROCEDURE — 99024 POSTOP FOLLOW-UP VISIT: CPT | Performed by: THORACIC SURGERY (CARDIOTHORACIC VASCULAR SURGERY)

## 2023-03-22 NOTE — PROGRESS NOTES
"Patient Information  Stephane Noe                                                                                          [address] [facility] [place] [address]      1950  [unfilled]  [unfilled]    Chief Complaint   Patient presents with   • Osteomyelitis      Hospital follow-up s/p right great toe amputation 3/1/23.       History of Present Illness: Patient seen today for his first postop clinic visit following a transmetatarsal amputation of right great toe with primary closure which was performed on March 1, 2023.  The patient left 2 days later AMA and therefore did not get any postop home antibiotics.  He did grow MRSA out of his cultures.  He comes back today having no complaints.  He did not get any home health visits.  Has been managing this situation by himself.    Vitals:    03/22/23 0911   BP: 112/56   BP Location: Right arm   Patient Position: Sitting   Pulse: 72   Temp: 97.5 °F (36.4 °C)   SpO2: 98%   Weight: 87.7 kg (193 lb 6.4 oz)   Height: 182.9 cm (72\")        Physical Exam dehiscence of the proximal two thirds of the incision with separation of the incision in that area of approximately 2 to 3 cm.  All the sutures in this area have been removed apparently by him there is no exposed bone.  The most distal third of the incision still has the sutures intact.  No drainage noted    Lab/other results:    Assessment: #1.  Transmetatarsal amputation of the right great toe with primary closure.  The procedure was done on March 1, 2023.  Cultures of the surgical amputation site grew MRSA.  Patient left AMA before the culture results returned.    Plan: I am going to start the patient on doxycycline 100 mg p.o. twice daily for 2-week course of therapy I will have him come back to see the nurse practitioner in 2 weeks for follow-up and at that time the remaining sutures in the surgical incision may be removed if appropriate.    Francisco Ramirez M.D.   "

## 2023-04-06 ENCOUNTER — OFFICE VISIT (OUTPATIENT)
Dept: CARDIAC SURGERY | Facility: CLINIC | Age: 73
End: 2023-04-06
Payer: MEDICARE

## 2023-04-06 VITALS
OXYGEN SATURATION: 96 % | WEIGHT: 187.2 LBS | BODY MASS INDEX: 25.35 KG/M2 | HEART RATE: 83 BPM | SYSTOLIC BLOOD PRESSURE: 100 MMHG | HEIGHT: 72 IN | DIASTOLIC BLOOD PRESSURE: 60 MMHG | TEMPERATURE: 97.1 F

## 2023-04-06 DIAGNOSIS — F17.200 TOBACCO DEPENDENCE: ICD-10-CM

## 2023-04-06 DIAGNOSIS — I73.9 PERIPHERAL VASCULAR DISEASE: ICD-10-CM

## 2023-04-06 DIAGNOSIS — I96 GANGRENE: Primary | ICD-10-CM

## 2023-04-06 DIAGNOSIS — M86.9 OSTEOMYELITIS OF TOE: ICD-10-CM

## 2023-04-06 PROCEDURE — 99024 POSTOP FOLLOW-UP VISIT: CPT | Performed by: REGISTERED NURSE

## 2023-04-06 RX ORDER — DOXYCYCLINE HYCLATE 100 MG/1
100 CAPSULE ORAL 2 TIMES DAILY
Qty: 28 CAPSULE | Refills: 0 | Status: SHIPPED | OUTPATIENT
Start: 2023-04-06 | End: 2023-04-20
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
## 2023-04-10 RX ORDER — LOSARTAN POTASSIUM 50 MG/1
TABLET ORAL
Qty: 90 TABLET | Refills: 2 | Status: SHIPPED | OUTPATIENT
Start: 2023-04-10

## 2023-04-12 LAB
FUNGUS WND CULT: NORMAL
MYCOBACTERIUM SPEC CULT: NORMAL
NIGHT BLUE STAIN TISS: NORMAL

## 2023-04-19 ENCOUNTER — OFFICE VISIT (OUTPATIENT)
Dept: CARDIAC SURGERY | Facility: CLINIC | Age: 73
End: 2023-04-19
Payer: MEDICARE

## 2023-04-19 VITALS
SYSTOLIC BLOOD PRESSURE: 90 MMHG | TEMPERATURE: 97.8 F | DIASTOLIC BLOOD PRESSURE: 58 MMHG | HEART RATE: 61 BPM | BODY MASS INDEX: 24.38 KG/M2 | HEIGHT: 72 IN | OXYGEN SATURATION: 98 % | WEIGHT: 180 LBS

## 2023-04-19 DIAGNOSIS — F17.200 TOBACCO DEPENDENCE: ICD-10-CM

## 2023-04-19 DIAGNOSIS — T81.31XD SUPERFICIAL DISRUPTION OR DEHISCENCE OF OPERATION WOUND, SUBSEQUENT ENCOUNTER: ICD-10-CM

## 2023-04-19 DIAGNOSIS — M86.9 OSTEOMYELITIS OF TOE: Primary | ICD-10-CM

## 2023-04-19 PROCEDURE — 99024 POSTOP FOLLOW-UP VISIT: CPT | Performed by: REGISTERED NURSE
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
## 2023-05-03 ENCOUNTER — OFFICE VISIT (OUTPATIENT)
Dept: CARDIAC SURGERY | Facility: CLINIC | Age: 73
End: 2023-05-03
Payer: MEDICARE

## 2023-05-03 VITALS
HEART RATE: 92 BPM | WEIGHT: 180.4 LBS | DIASTOLIC BLOOD PRESSURE: 50 MMHG | SYSTOLIC BLOOD PRESSURE: 80 MMHG | OXYGEN SATURATION: 94 % | BODY MASS INDEX: 24.47 KG/M2 | TEMPERATURE: 97.5 F

## 2023-05-03 DIAGNOSIS — S98.111A AMPUTATION OF RIGHT GREAT TOE: ICD-10-CM

## 2023-05-03 DIAGNOSIS — T81.31XD SUPERFICIAL DISRUPTION OR DEHISCENCE OF OPERATION WOUND, SUBSEQUENT ENCOUNTER: Primary | ICD-10-CM
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
## 2023-05-11 NOTE — NURSING NOTE
"Patient up OOB getting dressed states he is leaving, has a ride on the way. Conversated with patient the risks of signing out AMA, including the risk of death without proper treatment, pt. States \"I don't give a damn if I die, I'm leaving.\" Pt is oriented x4.  Arlene boland RN, [facility] Supervisor and EBONIE Ward also aware.  AMA paper signed.  "
Bed alarm alerted in room, pt fostanding up at bedside, pt, is aware of NWB status to Right foot  
Bed alarm alerted to patients room, found patient standing at bedside, patient aware of NWB status to right foot, however, pt states he is leaving, pulled both IV's out, removed EKG leads and pulse ox. Had conversation with patient, refuses IV to be replaced, states he will leave regardless of what the md says, pt has allowed monitor and oxygen to be replaced,will continue to monitor and speak with MD.  
Name band;

## 2023-05-22 RX ORDER — METOPROLOL SUCCINATE 50 MG/1
TABLET, EXTENDED RELEASE ORAL
Qty: 90 TABLET | Refills: 2 | Status: SHIPPED | OUTPATIENT
Start: 2023-05-22 | End: 2023-05-24

## 2023-05-22 RX ORDER — METFORMIN HYDROCHLORIDE 500 MG/1
TABLET, EXTENDED RELEASE ORAL
Qty: 360 TABLET | Refills: 2 | Status: SHIPPED | OUTPATIENT
Start: 2023-05-22

## 2023-05-24 ENCOUNTER — OFFICE VISIT (OUTPATIENT)
Dept: CARDIAC SURGERY | Facility: CLINIC | Age: 73
End: 2023-05-24

## 2023-05-24 VITALS
DIASTOLIC BLOOD PRESSURE: 58 MMHG | SYSTOLIC BLOOD PRESSURE: 100 MMHG | TEMPERATURE: 97.3 F | HEART RATE: 72 BPM | HEIGHT: 72 IN | BODY MASS INDEX: 24.43 KG/M2 | WEIGHT: 180.4 LBS | OXYGEN SATURATION: 99 %

## 2023-05-24 DIAGNOSIS — T81.31XD SUPERFICIAL DISRUPTION OR DEHISCENCE OF OPERATION WOUND, SUBSEQUENT ENCOUNTER: Primary | ICD-10-CM

## 2023-05-24 DIAGNOSIS — S98.111A AMPUTATION OF RIGHT GREAT TOE: ICD-10-CM

## 2023-05-24 PROCEDURE — 99024 POSTOP FOLLOW-UP VISIT: CPT | Performed by: REGISTERED NURSE

## 2023-05-24 PROCEDURE — 1160F RVW MEDS BY RX/DR IN RCRD: CPT | Performed by: REGISTERED NURSE

## 2023-05-24 PROCEDURE — 1159F MED LIST DOCD IN RCRD: CPT | Performed by: REGISTERED NURSE
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
## 2023-07-05 PROBLEM — I10 PRIMARY HYPERTENSION: Status: ACTIVE | Noted: 2023-07-05

## 2023-07-05 PROBLEM — E61.2 MAGNESIUM DEFICIENCY: Status: ACTIVE | Noted: 2023-07-05

## 2023-08-23 ENCOUNTER — OFFICE VISIT (OUTPATIENT)
Dept: CARDIAC SURGERY | Facility: CLINIC | Age: 73
End: 2023-08-23
Payer: MEDICARE

## 2023-08-23 VITALS
HEART RATE: 105 BPM | OXYGEN SATURATION: 97 % | DIASTOLIC BLOOD PRESSURE: 68 MMHG | BODY MASS INDEX: 24.11 KG/M2 | SYSTOLIC BLOOD PRESSURE: 128 MMHG | HEIGHT: 72 IN | WEIGHT: 178 LBS | TEMPERATURE: 97.7 F

## 2023-08-23 DIAGNOSIS — T81.31XD SUPERFICIAL DISRUPTION OR DEHISCENCE OF OPERATION WOUND, SUBSEQUENT ENCOUNTER: ICD-10-CM

## 2023-08-23 DIAGNOSIS — S98.111A AMPUTATION OF RIGHT GREAT TOE: Primary | ICD-10-CM

## 2023-08-23 DIAGNOSIS — I73.9 PERIPHERAL VASCULAR DISEASE: ICD-10-CM
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
## 2023-09-01 ENCOUNTER — OFFICE VISIT (OUTPATIENT)
Dept: CARDIOLOGY | Facility: CLINIC | Age: 73
End: 2023-09-01
Payer: MEDICARE

## 2023-09-01 VITALS
WEIGHT: 178 LBS | BODY MASS INDEX: 24.11 KG/M2 | HEART RATE: 64 BPM | DIASTOLIC BLOOD PRESSURE: 68 MMHG | SYSTOLIC BLOOD PRESSURE: 118 MMHG | HEIGHT: 72 IN | OXYGEN SATURATION: 98 %

## 2023-09-01 DIAGNOSIS — I48.0 PAROXYSMAL ATRIAL FIBRILLATION: ICD-10-CM

## 2023-09-01 DIAGNOSIS — I25.10 CORONARY ARTERY DISEASE INVOLVING NATIVE HEART WITHOUT ANGINA PECTORIS, UNSPECIFIED VESSEL OR LESION TYPE: ICD-10-CM

## 2023-09-01 RX ORDER — ATORVASTATIN CALCIUM 80 MG/1
80 TABLET, FILM COATED ORAL DAILY
Qty: 90 TABLET | Refills: 1 | Status: SHIPPED | OUTPATIENT
Start: 2023-09-01

## 2023-09-01 RX ORDER — METOPROLOL SUCCINATE 25 MG/1
TABLET, EXTENDED RELEASE ORAL
Qty: 45 TABLET | Refills: 1 | Status: SHIPPED | OUTPATIENT
Start: 2023-09-01

## 2023-09-01 RX ORDER — ASPIRIN 81 MG/1
81 TABLET ORAL DAILY
Qty: 90 TABLET | Refills: 1 | Status: SHIPPED | OUTPATIENT
Start: 2023-09-01
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
## 2023-10-03 ENCOUNTER — OFFICE VISIT (OUTPATIENT)
Dept: FAMILY MEDICINE CLINIC | Facility: CLINIC | Age: 73
End: 2023-10-03
Payer: MEDICARE

## 2023-10-03 VITALS
TEMPERATURE: 97.3 F | SYSTOLIC BLOOD PRESSURE: 124 MMHG | HEART RATE: 87 BPM | BODY MASS INDEX: 25.09 KG/M2 | WEIGHT: 185.2 LBS | DIASTOLIC BLOOD PRESSURE: 80 MMHG | OXYGEN SATURATION: 100 % | HEIGHT: 72 IN

## 2023-10-03 DIAGNOSIS — I25.10 CORONARY ARTERY DISEASE INVOLVING NATIVE HEART WITHOUT ANGINA PECTORIS, UNSPECIFIED VESSEL OR LESION TYPE: Primary | ICD-10-CM

## 2023-10-03 DIAGNOSIS — E11.42 TYPE 2 DIABETES MELLITUS WITH DIABETIC POLYNEUROPATHY, WITHOUT LONG-TERM CURRENT USE OF INSULIN: ICD-10-CM

## 2023-10-03 DIAGNOSIS — Z23 INFLUENZA VACCINATION ADMINISTERED AT CURRENT VISIT: ICD-10-CM

## 2023-10-03 DIAGNOSIS — F17.210 CIGARETTE SMOKER: ICD-10-CM

## 2023-10-03 DIAGNOSIS — I10 PRIMARY HYPERTENSION: ICD-10-CM

## 2023-10-03 DIAGNOSIS — J44.9 COPD, SEVERE: ICD-10-CM

## 2023-10-03 DIAGNOSIS — I48.21 PERMANENT ATRIAL FIBRILLATION: ICD-10-CM

## 2023-10-03 LAB
EXPIRATION DATE: NORMAL
GLUCOSE BLDC GLUCOMTR-MCNC: 115 MG/DL (ref 70–130)
HBA1C MFR BLD: 5.7 %
Lab: NORMAL

## 2023-10-03 NOTE — PROGRESS NOTES
Follow Up Office Visit      Date: 10/03/2023   Patient Name: Stephane Noe  : 1950   MRN: [id number]     Chief Complaint:    Chief Complaint   Patient presents with    Follow-up       History of Present Illness: Stephane Noe is a 72 y.o. male who is here today for routine 3-month review.  Last visit he had a very low hemoglobin A1c of 5.1% having been taking metformin  mg at 4 tablets daily.  We reduced his dose down to 2 tablets daily, and he checks his blood sugars once or twice weekly averaging 100s to 110s.  He does have a history of neuropathy but describes lately only some cold sensation in his feet but no pain or actual numbness, also having a history of ASHD with permanent atrial fibrillation, having had previous GI bleed prompting discontinuation of anticoagulation simply taking aspirin 81 mg daily in its place.  Also has significant PVD, status post AAA repair with stent placement to the abdominal aorta previously, most recent ultrasound 2023 with a mild proximal aortic aneurysm but otherwise stable, last echocardiogram 2022 with EF 55 to 60% and mild aortic sclerosis without stenosis or insufficiency.  Status post amputation of the right great toe which is ultimately healed well.  Patient had been having low blood pressures prompting him to discontinue his his losartan and metoprolol, with blood pressures subsequently running 100s over 70s.  We continued holding those 2 agents, but subsequently he has had reinitiation of metoprolol XL 25 mg at 1/2 tablet by cardiology with blood pressures currently in the 110s to 120s over 70s to low 80s once or twice weekly.  Denies chest pains palpitations or edema.  He does have cool feet as noted.  He also has COPD with mild dyspnea on extreme exertion only, occasional cough that is nonproductive, no resting dyspnea, patient not currently taking any chronic respiratory meds and declining any interest in reinitiation.  He also does have  "intermittent low back pain with some radiation towards the buttocks with only positional changes.  He did have x-rays several months ago revealing some mild degenerative changes.  Survivor of cancer, supraglottic cancer, and prostate cancer, all clinically stable.  Continues to smoke 2 or 3 cigarettes daily not interested in complete discontinuation, also drinks 1 or 2 shots of whiskey nightly and not interested in stopping.  Subjective      Review of Systems:   Review of Systems    I have reviewed the patients family history, social history, past medical history, past surgical history and have updated it as appropriate.     Medications:     Current Outpatient Medications:     aspirin 81 MG EC tablet, Take 1 tablet by mouth Daily., Disp: 90 tablet, Rfl: 1    atorvastatin (LIPITOR) 80 MG tablet, Take 1 tablet by mouth Daily., Disp: 90 tablet, Rfl: 1    magnesium oxide (MAG-OX) 400 MG tablet, Take 1 tablet by mouth Daily., Disp: , Rfl:     metFORMIN ER (GLUCOPHAGE-XR) 500 MG 24 hr tablet, TAKE 4 TABLETS EVERY EVENING WITH MEAL FOR DIABETES (Patient taking differently: 1 tablet. Two in evening), Disp: 360 tablet, Rfl: 2    metoprolol succinate XL (TOPROL-XL) 25 MG 24 hr tablet, Take 1/2 pill daily, Disp: 45 tablet, Rfl: 1    sildenafil (VIAGRA) 100 MG tablet, Take 1 tablet by mouth Daily As Needed for Erectile Dysfunction., Disp: , Rfl:     Allergies:   Allergies   Allergen Reactions    Lisinopril Cough       Objective     Physical Exam: Please see above  Vital Signs:   Vitals:    10/03/23 0810   BP: 124/80   BP Location: Left arm   Patient Position: Sitting   Cuff Size: Adult   Pulse: 87   Temp: 97.3 °F (36.3 °C)   TempSrc: Temporal   SpO2: 100%   Weight: 84 kg (185 lb 3.2 oz)   Height: 182.9 cm (72\")     Body mass index is 25.12 kg/m².          Physical Exam  General: Pleasant 72-year-old male, not acutely ill-appearing, frequent wet nonproductive cough.  Lungs fair airflow with no actual wheezes or focal " consolidation, no resting tachypnea or dyspnea  Cardiac irregular rate rhythm with no murmurs gallops rubs, having no dependent edema but difficult to palpate bipedal pulses with cool extremities and slightly delayed capillary refill bilaterally  Bipedal exam with surgical absence and ultimate good healing of the right great toe, poor pulses, delayed capillary perfusion with cool feet, normal subjective sensation of both feet to fine touch vibration and pinprick with motor exam normal, no current ulceration  Procedures    Results:   Labs:   Hemoglobin A1C   Date Value Ref Range Status   10/03/2023 5.7 % Final   02/27/2023 5.00 4.80 - 5.60 % Final     TSH   Date Value Ref Range Status   07/10/2023 2.720 0.358 - 3.740 uIU/mL Final        POCT Results (if applicable):   Results for orders placed or performed in visit on 10/03/23   POC Glycosylated Hemoglobin (Hb A1C)    Specimen: Blood   Result Value Ref Range    Hemoglobin A1C 5.7 %    Lot Number [id number]     Expiration Date 06/20/2025    POC Glucose    Specimen: Blood   Result Value Ref Range    Glucose 115 70 - 130 mg/dL       Imaging:   No valid procedures specified.     Smoking Cessation:   3-10 mintues spent counseling has significantly reduced consumption but is not interested in completely stopping    Assessment / Plan      Assessment/Plan:   Diagnoses and all orders for this visit:    1. Coronary artery disease involving native heart without angina pectoris, unspecified vessel or lesion type (Primary)  Patient asymptomatic take metoprolol XL 25 mg at 0.5 tablets or 12.5 mg daily, atorvastatin 80 mg daily, and aspirin 81 mg daily.  Last EF 55 to 60% with mild aortic sclerosis but no stenosis or regurg on echocardiogram from 1/2022.  Regular cardiology follow-up pursued.  2. Permanent atrial fibrillation  Taking aspirin 81 mg daily place of DOAC given significant GI bleed on the latter.  Also on low-dose beta-blocker.  3. Type 2 diabetes mellitus with diabetic  polyneuropathy, without long-term current use of insulin  -     POC Glycosylated Hemoglobin (Hb A1C)  -     POC Glucose  3 months prior had reduction in metformin  mg from 4 tablets daily down to 2 tablets daily given excessive control at that time with a hemoglobin A1c of 5.1%.  Hemoglobin A1c today very satisfactory at 5.7%.  Continue current regimen.  Not taking ARB or ACE inhibitor given previous significant low blood pressures.  We will reassess at follow-up visit.  Advised to update diabetic eye evaluation.  4. Primary hypertension  Overall satisfactory control currently taking low-dose metoprolol XL 25 mg at 0.5 tablets or 12.5 mg daily.  5. COPD, severe  Patient with mild symptoms upon extreme exertion, frequent wet nonproductive cough, but not interested in initiation of chronic maintenance meds.  Has albuterol to use as needed.  6.  Cigarette smoker  Patient has reduced consumption from pack a day previously but now down to 2 or 3 cigarettes daily.  Unfortunately not interested in complete cessation.  All aware of the health risks of which he has already experienced many.  7. Influenza vaccination administered at current visit  -     Fluzone High-Dose 65+yrs  Patient indicates he is scheduled to get his latest COVID booster today at outside pharmacy.  I advised him to also check into getting the Shingrix vaccine series.      Vaccine Counseling:  “Discussed risks/benefits to vaccination, reviewed components of the vaccine, discussed VIS, discussed informed consent, informed consent obtained. Patient/Parent was allowed to accept or refuse vaccine. Questions answered to satisfactory state of patient/Parent. We reviewed typical age appropriate and seasonally appropriate vaccinations. Reviewed immunization history and updated state vaccination form as needed. Patient was counseled on Influenza    Follow Up:   Return in about 3 months (around 1/3/2024) for Medicare Subsq..      At [facility], we  believe that sharing information builds trust and better relationships. You are receiving this note because you recently visited [facility]. It is possible you will see health information before a provider has talked with you about it. This kind of information can be easy to misunderstand. To help you fully understand what it means for your health, we urge you to discuss this note with your provider.    Nain Morel MD  [facility]

## 2023-11-10 ENCOUNTER — TELEPHONE (OUTPATIENT)
Dept: FAMILY MEDICINE CLINIC | Facility: CLINIC | Age: 73
End: 2023-11-10
Payer: MEDICARE

## 2023-11-10 NOTE — TELEPHONE ENCOUNTER
Caller: Stephane Noe     Relationship: PATIENT    Best call back number:  [phone]    What is your medical concern? ARTHRITIS GETTING WORSE. COULD BARELY WALK. PATIENT STATED HIS FRIEND GAVE HIM 4 MELOXICAM PILLS THAT HE TOOK ONE PER DAY AND THEY REALLY HELPED.   COULD YOU PLEASE PRESCRIBE THIS MEDICATION AND SEND TO PATIENTS [facility] MAIL DELIVERY?  PLEASE CALL TO ADVISE PATIENT.  THANK YOU.

## 2023-11-15 ENCOUNTER — OFFICE VISIT (OUTPATIENT)
Dept: FAMILY MEDICINE CLINIC | Facility: CLINIC | Age: 73
End: 2023-11-15
Payer: MEDICARE

## 2023-11-15 VITALS
HEART RATE: 86 BPM | DIASTOLIC BLOOD PRESSURE: 90 MMHG | OXYGEN SATURATION: 97 % | TEMPERATURE: 97.8 F | BODY MASS INDEX: 27.3 KG/M2 | WEIGHT: 201.6 LBS | SYSTOLIC BLOOD PRESSURE: 138 MMHG | HEIGHT: 72 IN

## 2023-11-15 DIAGNOSIS — I48.21 PERMANENT ATRIAL FIBRILLATION: ICD-10-CM

## 2023-11-15 DIAGNOSIS — G89.29 CHRONIC BILATERAL LOW BACK PAIN WITH BILATERAL SCIATICA: Primary | ICD-10-CM

## 2023-11-15 DIAGNOSIS — M54.42 CHRONIC BILATERAL LOW BACK PAIN WITH BILATERAL SCIATICA: Primary | ICD-10-CM

## 2023-11-15 DIAGNOSIS — E11.42 TYPE 2 DIABETES MELLITUS WITH DIABETIC POLYNEUROPATHY, WITHOUT LONG-TERM CURRENT USE OF INSULIN: ICD-10-CM

## 2023-11-15 DIAGNOSIS — I10 PRIMARY HYPERTENSION: ICD-10-CM

## 2023-11-15 DIAGNOSIS — I25.10 CORONARY ARTERY DISEASE INVOLVING NATIVE HEART WITHOUT ANGINA PECTORIS, UNSPECIFIED VESSEL OR LESION TYPE: ICD-10-CM

## 2023-11-15 DIAGNOSIS — M54.41 CHRONIC BILATERAL LOW BACK PAIN WITH BILATERAL SCIATICA: Primary | ICD-10-CM

## 2023-11-15 PROBLEM — F17.200 TOBACCO DEPENDENCE: Status: RESOLVED | Noted: 2023-04-06 | Resolved: 2023-11-15

## 2023-11-15 PROCEDURE — 3044F HG A1C LEVEL LT 7.0%: CPT | Performed by: INTERNAL MEDICINE

## 2023-11-15 PROCEDURE — 1159F MED LIST DOCD IN RCRD: CPT | Performed by: INTERNAL MEDICINE

## 2023-11-15 PROCEDURE — 3080F DIAST BP >= 90 MM HG: CPT | Performed by: INTERNAL MEDICINE

## 2023-11-15 PROCEDURE — 99214 OFFICE O/P EST MOD 30 MIN: CPT | Performed by: INTERNAL MEDICINE

## 2023-11-15 PROCEDURE — 1160F RVW MEDS BY RX/DR IN RCRD: CPT | Performed by: INTERNAL MEDICINE

## 2023-11-15 PROCEDURE — 3075F SYST BP GE 130 - 139MM HG: CPT | Performed by: INTERNAL MEDICINE

## 2023-11-15 RX ORDER — PREDNISONE 20 MG/1
20 TABLET ORAL 2 TIMES DAILY
Qty: 12 TABLET | Refills: 0 | Status: SHIPPED | OUTPATIENT
Start: 2023-11-15 | End: 2023-11-21

## 2023-11-15 RX ORDER — MELOXICAM 15 MG/1
15 TABLET ORAL DAILY
Qty: 90 TABLET | Refills: 1 | Status: SHIPPED | OUTPATIENT
Start: 2023-11-15

## 2023-11-15 NOTE — PROGRESS NOTES
Follow Up Office Visit      Date: 11/15/2023   Patient Name: Stephane Noe  : 1950   MRN: [id number]     Chief Complaint:    Chief Complaint   Patient presents with    Follow-up       History of Present Illness: Stephane Noe is a 72 y.o. male who is here today for primary complaint of persistence of 1 month of nontraumatic low back pain bilaterally at his belt line, with some radiation down the posterior aspect of both legs towards the knees, this pain primarily triggered by change in position such as sitting from supine position or standing from seated position, less prominent when he is stationary.  No numbness or tingling in his legs, no lower extremity weakness other than that limited by pain, no loss of bowel or bladder control..  Notes in the past he did have some arthritic pains in his back but not recently.  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  He did have an L-spine x-ray on 2023 revealing multilevel severe degenerative endplate and facet changes with severe disc space narrowing at L5-S1.  Patient notes that he did borrow a friend's Mobic tablet, dose unknown, taking 1 daily for the last 4 days which has helped his discomfort to some degree.  Patient was just seen about a month ago in general has been clinically stable, noting he is a diabetic taking metformin 1000 mg daily with hemoglobin A1c most recently 5.7%, blood sugars in the low 100s, hypertension with use of metoprolol simply at 12.5 mg daily with blood pressures averaging 120s over 80s.  Again he does have a history of lung cancer and oropharyngeal cancer with most recent CT scan of his neck and chest stable from 2023, history of ASHD on metoprolol Lipitor and aspirin with preserved EF, along with permanent A-fib on aspirin rather than DOAC given previous GI bleed, no chest pains palpitations or  "unusual dyspnea.  He does continue to smoke but has reduced down to less than a pack a day.    Subjective      Review of Systems:   Review of Systems    I have reviewed the patients family history, social history, past medical history, past surgical history and have updated it as appropriate.     Medications:     Current Outpatient Medications:     aspirin 81 MG EC tablet, Take 1 tablet by mouth Daily., Disp: 90 tablet, Rfl: 1    atorvastatin (LIPITOR) 80 MG tablet, Take 1 tablet by mouth Daily., Disp: 90 tablet, Rfl: 1    magnesium oxide (MAG-OX) 400 MG tablet, Take 1 tablet by mouth Daily., Disp: , Rfl:     metFORMIN ER (GLUCOPHAGE-XR) 500 MG 24 hr tablet, TAKE 4 TABLETS EVERY EVENING WITH MEAL FOR DIABETES (Patient taking differently: 1 tablet. Two in evening), Disp: 360 tablet, Rfl: 2    metoprolol succinate XL (TOPROL-XL) 25 MG 24 hr tablet, Take 1/2 pill daily, Disp: 45 tablet, Rfl: 1    sildenafil (VIAGRA) 100 MG tablet, Take 1 tablet by mouth Daily As Needed for Erectile Dysfunction., Disp: , Rfl:     meloxicam (MOBIC) 15 MG tablet, Take 1 tablet by mouth Daily. As needed for pain; do not take with other anti-inflammatories, Disp: 90 tablet, Rfl: 1    predniSONE (DELTASONE) 20 MG tablet, Take 1 tablet by mouth 2 (Two) Times a Day for 6 days., Disp: 12 tablet, Rfl: 0    Allergies:   Allergies   Allergen Reactions    Lisinopril Cough       Objective     Physical Exam: Please see above  Vital Signs:   Vitals:    11/15/23 0834   BP: 138/90   BP Location: Right arm   Patient Position: Sitting   Cuff Size: Adult   Pulse: 86   Temp: 97.8 °F (36.6 °C)   TempSrc: Temporal   SpO2: 97%   Weight: 91.4 kg (201 lb 9.6 oz)   Height: 182.9 cm (72\")     Body mass index is 27.34 kg/m².          Physical Exam  General: Very pleasant elderly male, not acutely ill, complaining of significant lower back pain especially with any kind of attempts to change in position.  He appears more comfortable when standing or with routine " walking.  Lungs clear  Cardiac irregular rate rhythm with no murmurs gallops or rubs  Musculoskeletal exam reveals really no significant palpable tenderness of his lumbar spine but significant pain with change in position as noted, straight leg raise testing on the right causing some lateral buttocks pain at 45 degrees, minimally noted similarly on the left, strength and sensation in lower extremities intact, 2+ knee jerks bilaterally and symmetrically with bilaterally difficult to elicit ankle jerks    Procedures    Results:   Labs:   Hemoglobin A1C   Date Value Ref Range Status   10/03/2023 5.7 % Final   02/27/2023 5.00 4.80 - 5.60 % Final     TSH   Date Value Ref Range Status   07/10/2023 2.720 0.358 - 3.740 uIU/mL Final        POCT Results (if applicable):   Results for orders placed or performed in visit on 10/03/23   POC Glycosylated Hemoglobin (Hb A1C)    Specimen: Blood   Result Value Ref Range    Hemoglobin A1C 5.7 %    Lot Number [id number]     Expiration Date 06/20/2025    POC Glucose    Specimen: Blood   Result Value Ref Range    Glucose 115 70 - 130 mg/dL       Imaging:   Plain film imaging of lumbar spine from 7/5/2023 revealing severe degenerative changes including endplate and facet changes, and disc space narrowing.    Smoking Cessation:   3-10 mintues spent counseling Has reduced [place] use    Assessment / Plan      Assessment/Plan:   Diagnoses and all orders for this visit:    1. Chronic bilateral low back pain with bilateral sciatica (Primary)  -     meloxicam (MOBIC) 15 MG tablet; Take 1 tablet by mouth Daily. As needed for pain; do not take with other anti-inflammatories  Dispense: 90 tablet; Refill: 1  -     predniSONE (DELTASONE) 20 MG tablet; Take 1 tablet by mouth 2 (Two) Times a Day for 6 days.  Dispense: 12 tablet; Refill: 0  -     CT Lumbar Spine With Contrast; Future  Patient having more acute back pain recently with chronic intermittent back pain in the past, current symptoms present  for the last month, noted more with change in position, having some bilateral radicular symptoms.  My concern is he does have history of prostate, lung, and posterior pharyngeal cancers, and that occult metastatic disease needs to be ruled out.  I did discuss this case with radiology PA, with recommendation to pursue CT of the lumbar spine with contrast as the most sensitive test for bony abnormalities.  If this does not show any metastatic disease, we will assess clinical response to the above regimen with prednisone and meloxicam (noting history of normal renal function), and if continues to have symptoms, will then consider MRI imaging for more detailed evaluation of soft tissue structures.  2. Coronary artery disease involving native heart without angina pectoris, unspecified vessel or lesion type  Currently asymptomatic.  Continue risk factor modification including Toprol, Lipitor, and aspirin.  Advised to discontinue smoking habit.  3. Permanent atrial fibrillation  On metoprolol for rate control along with aspirin rather than DOAC, given previous GI bleed.  4. Type 2 diabetes mellitus with diabetic polyneuropathy, without long-term current use of insulin  Most recent hemoglobin A1c 5.7% in 10/2023 on metformin  mg at 2 tablets daily.  5. Primary hypertension  -     Basic Metabolic Panel; Future  Satisfactory acute as well as by history chronic control on simply low-dose metoprolol XL 25 mg at 0.5 tablets or 12.5 mg daily.    Follow Up:   Return if symptoms worsen or fail to improve.      At [facility], we believe that sharing information builds trust and better relationships. You are receiving this note because you recently visited [facility]. It is possible you will see health information before a provider has talked with you about it. This kind of information can be easy to misunderstand. To help you fully understand what it means for your health, we urge you to discuss this note with your  provider.    Nain Morel MD  [facility]

## 2023-11-21 ENCOUNTER — TELEPHONE (OUTPATIENT)
Dept: FAMILY MEDICINE CLINIC | Facility: CLINIC | Age: 73
End: 2023-11-21
Payer: MEDICARE

## 2023-11-21 DIAGNOSIS — I71.43 INFRARENAL ABDOMINAL AORTIC ANEURYSM (AAA) WITHOUT RUPTURE: Primary | ICD-10-CM

## 2023-11-21 DIAGNOSIS — G89.29 CHRONIC BILATERAL LOW BACK PAIN WITH BILATERAL SCIATICA: ICD-10-CM

## 2023-11-21 DIAGNOSIS — M54.42 CHRONIC BILATERAL LOW BACK PAIN WITH BILATERAL SCIATICA: ICD-10-CM

## 2023-11-21 DIAGNOSIS — M54.41 CHRONIC BILATERAL LOW BACK PAIN WITH BILATERAL SCIATICA: ICD-10-CM

## 2023-11-21 DIAGNOSIS — I72.3 ANEURYSM OF COMMON ILIAC ARTERY: ICD-10-CM

## 2023-11-21 DIAGNOSIS — M53.3 SACRAL LESION: ICD-10-CM

## 2023-11-21 NOTE — TELEPHONE ENCOUNTER
----- Message from Nain Morel MD sent at 11/20/2023  6:14 PM EST -----  Please advise patient that his recent kidney function and electrolyte profile was normal.    I have spoke with him regarding his lab results. TF

## 2023-11-21 NOTE — TELEPHONE ENCOUNTER
Phone conversation with patient regarding results of CT of the L-spine with contrast from 11/20/2023 as follows:    Multilevel degenerative changes, with note of a lucent lesion in the base of the sacrum measuring 1.7 cm interpreted as possibly degenerative but metastasis not excluded.  Patient also had incidental finding of infrarenal AAA measuring up to 3.6 cm extending to the bifurcation with dilation of the left common iliac artery to 2.1 cm in the right common iliac artery up to 2.5 cm with the right internal iliac artery 1.8 cm in the left internal iliac artery 1.6 cm.    Assessment/plan:  1.  Acute on chronic lumbago with extensive spondylosis as noted, also note of a lucent 1.7 cm lesion in the sacrum, etiology potentially degenerative versus metastatic lesion not ruled out.  Patient does have history of lung cancer prostate cancer and supraglottic cancer.  Per radiology recommendation we will pursue MRI of the L-spine with and without contrast to further delineate the sacral lesion.  2.  Multiple aneurysms, including infrarenal aneurysm mildly noted at 3.6 cm with bilateral common iliac aneurysms as noted.  Patient had prior AAA repair in 2011 with a aneurysm of 8.2 cm.  Per recommendation of guidelines, the iliac aneurysms are to be monitored every 6 months in the aortic aneurysm once a year, thus will obtain ultrasound analysis in 6 months.  Factor modification discussed including control of blood pressure cholesterol and also need for smoking cessation.

## 2023-11-30 DIAGNOSIS — M53.3 SACRAL LESION: ICD-10-CM

## 2023-12-01 ENCOUNTER — TELEPHONE (OUTPATIENT)
Dept: FAMILY MEDICINE CLINIC | Facility: CLINIC | Age: 73
End: 2023-12-01
Payer: MEDICARE

## 2023-12-01 DIAGNOSIS — G95.9 INTRAMEDULLARY ABNORMALITY OF SPINAL CORD: Primary | ICD-10-CM

## 2023-12-01 NOTE — TELEPHONE ENCOUNTER
Phone conversation with patient regarding results of imaging from 11/29/2023 as follows:    MRI of the L-spine with and without contrast from 11/29/2023 reveals multiple levels of advanced degenerative changes/spondylosis with several areas of spinal stenosis and neuroforaminal narrowing.  There was initially a concern about a lesion noted on the lumbar CT from 11/20/2023 involving the S1 vertebrae that on subsequent MRI imaging 11/29/2023 appears to be likely a degenerative cyst measuring 1.5 cm with nonenhancement noted, but a separate lesion noted within the spinal canal at the L2 level having abnormal enhancement, possibly representing meningioma, neural cyst tissue tumor, or a metastatic lesion.  I did talk directly with radiologist to confirm the findings.  Also has incidental findings of ectasia of the abdominal aorta and right iliac artery consistent with aneurysms previously diagnosed.    Assessment/plan:  1.  Abnormal enhancing lesion in the spinal canal at the L2 level.  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  Certainly concern about possible metastatic lesion at the L2 level of the spinal canal.  He is currently managed by Dr. Shawn Ray of hematology/oncology, and will refer to neurosurgery for further evaluation of this abnormality.  Patient understands to bring copies of both his CT and MRI of his L-spine with him to that appointment.  Patient understands discussion and concern regarding the findings.

## 2024-01-01 ENCOUNTER — READMISSION MANAGEMENT (OUTPATIENT)
Dept: CALL CENTER | Facility: HOSPITAL | Age: 74
End: 2024-01-01
Payer: MEDICARE

## 2024-01-01 ENCOUNTER — TELEPHONE (OUTPATIENT)
Dept: FAMILY MEDICINE CLINIC | Facility: CLINIC | Age: 74
End: 2024-01-01
Payer: MEDICARE

## 2024-03-06 ENCOUNTER — READMISSION MANAGEMENT (OUTPATIENT)
Dept: CALL CENTER | Facility: HOSPITAL | Age: 74
End: 2024-03-06
Payer: MEDICARE

## 2024-03-06 ENCOUNTER — TELEPHONE (OUTPATIENT)
Dept: FAMILY MEDICINE CLINIC | Facility: CLINIC | Age: 74
End: 2024-03-06
Payer: MEDICARE

## 2024-03-06 NOTE — TELEPHONE ENCOUNTER
Caller: PB    Relationship to patient: DISCHARGE     Best call back number: [phone]     New or established patient?  [] New  [x] Established    Date of discharge: 03/06/24    Facility discharged from: [facility]    Diagnosis/Symptoms: RESPIRATORY FAILURE-HYPOXIA    Length of stay (If applicable): ARRIVED 01/02/24    Specialty Only: Did you see a [facility] provider?    [] Yes  [x] No  If so, who?

## 2024-03-06 NOTE — OUTREACH NOTE
Prep Survey      Flowsheet Row Responses   Gnosticism facility patient discharged from? Non-BH   Is LACE score < 7 ? Non- Discharge   Eligibility [facility]   Date of Admission 01/02/24   Date of Discharge 03/06/24   Discharge Disposition Home or Self Care   Discharge diagnosis RESPIRATORY FAILURE-HYPOXIA   Does the patient have one of the following disease processes/diagnoses(primary or secondary)? Other   Prep survey completed? Yes            Lavinia CORCORAN - Registered Nurse

## 2024-03-07 ENCOUNTER — TRANSITIONAL CARE MANAGEMENT TELEPHONE ENCOUNTER (OUTPATIENT)
Dept: CALL CENTER | Facility: HOSPITAL | Age: 74
End: 2024-03-07
Payer: MEDICARE

## 2024-03-07 NOTE — OUTREACH NOTE
Call Center TCM Note      Flowsheet Row Responses   [facility] [place] patient discharged from? Non-   Does the patient have one of the following disease processes/diagnoses(primary or secondary)? Other   TCM attempt successful? Yes   Call start time 0941   Call end time 0944   Discharge diagnosis RESPIRATORY FAILURE-HYPOXIA   Meds reviewed with patient/caregiver? Yes   Is the patient having any side effects they believe may be caused by any medication additions or changes? No   Does the patient have all medications ordered at discharge? Yes   Is the patient taking all medications as directed (includes completed medication regime)? Yes   Comments Hospital d/c f/u appt on 3/11/24 @10:15am   Does the patient have an appointment with their PCP within 7-14 days of discharge? Yes   Has home health visited the patient within 72 hours of discharge? N/A   DME comments Pt uses a walker for balance   Psychosocial issues? No   Did the patient receive a copy of their discharge instructions? Yes   Nursing interventions Reviewed instructions with patient   What is the patient's perception of their health status since discharge? Same  [weak]   Is the patient/caregiver able to teach back the hierarchy of who to call/visit for symptoms/problems? PCP, Specialist, Home health nurse, Urgent Care, ED, 911 Yes   TCM call completed? Yes   Call end time 0944   Would this patient benefit from a Referral to Amb Social Work? No   Is the patient interested in additional calls from an ambulatory ? No            Sonya Pedraza RN    3/7/2024, 09:44 EST

## 2024-03-08 ENCOUNTER — TELEPHONE (OUTPATIENT)
Dept: FAMILY MEDICINE CLINIC | Facility: CLINIC | Age: 74
End: 2024-03-08
Payer: MEDICARE

## 2024-03-08 NOTE — TELEPHONE ENCOUNTER
Caller: LEANN WITH [facility]    Relationship: Home Health    Best call back number:[phone]     What orders are you requesting (i.e. lab or imaging): NURSING, PT, OT AND SPEECH     In what timeframe would the patient need to come in: ASAP     Where will you receive your lab/imaging services: HOME     Additional notes: WANTS TO MAKE SURE WE WILL FOLLOW AND ORDER FOR PATIENT, HAS BEEN IN HOSPITAL SINCE JANUARY.

## 2024-03-13 ENCOUNTER — TELEPHONE (OUTPATIENT)
Dept: FAMILY MEDICINE CLINIC | Facility: CLINIC | Age: 74
End: 2024-03-13
Payer: MEDICARE

## 2024-03-13 NOTE — TELEPHONE ENCOUNTER
Caller: YAMILKA WITH [facility]    Relationship: Home Health    Best call back number: [phone]    What is the best time to reach you: ANYTIME     Who are you requesting to speak with (clinical staff, provider,  specific staff member): CLINICAL STAFF     Do you know the name of the person who called:  YAMILKA WITH [facility]    What was the call regarding:  CRYSTAL WITH [facility] REPORTS SHE DID THE OCCUPATIONAL THERAPY EVALUATION, TODAY, 03/13/2024, REPORTS IT WILL BE AN EVAL ONLY AND ADVISED THE PATIENT DOES NOT NEED OCCUPATIONAL THERAPY. CRYSTAL ALSO STATES THAT THE PATIENT REFUSED SPEECH THERAPY AND STATED HE WAS NOT HAVING ANY ISSUES WITH SWALLOWING OR MEMORY.     YAMILKA REPORTS THE PATIENT WILL BE SEEING SKILLED NURSING AND PHYSICAL THERAPY.     PLEASE CALL IF ANY QUESTIONS OR CONCERNS.

## 2024-03-15 NOTE — TELEPHONE ENCOUNTER
Spoke to nurse Esparza, overall concerned about patient vitals irregular heart beat, pulse going  bp started at 88/5ish both arms after walking only 99/66 we have requested he go to er to get checked out.

## 2024-03-25 ENCOUNTER — OFFICE VISIT (OUTPATIENT)
Dept: FAMILY MEDICINE CLINIC | Facility: CLINIC | Age: 74
End: 2024-03-25
Payer: MEDICARE

## 2024-03-25 VITALS
OXYGEN SATURATION: 96 % | SYSTOLIC BLOOD PRESSURE: 96 MMHG | TEMPERATURE: 98.1 F | HEART RATE: 90 BPM | WEIGHT: 184 LBS | HEIGHT: 72 IN | DIASTOLIC BLOOD PRESSURE: 64 MMHG | BODY MASS INDEX: 24.92 KG/M2

## 2024-03-25 DIAGNOSIS — F10.10 ETOH ABUSE: ICD-10-CM

## 2024-03-25 DIAGNOSIS — E11.42 TYPE 2 DIABETES MELLITUS WITH DIABETIC POLYNEUROPATHY, WITHOUT LONG-TERM CURRENT USE OF INSULIN: ICD-10-CM

## 2024-03-25 DIAGNOSIS — N40.1 BENIGN PROSTATIC HYPERPLASIA WITH WEAK URINARY STREAM: ICD-10-CM

## 2024-03-25 DIAGNOSIS — R31.0 GROSS HEMATURIA: ICD-10-CM

## 2024-03-25 DIAGNOSIS — D64.9 ANEMIA, UNSPECIFIED TYPE: ICD-10-CM

## 2024-03-25 DIAGNOSIS — I10 PRIMARY HYPERTENSION: ICD-10-CM

## 2024-03-25 DIAGNOSIS — C32.1 SQUAMOUS CELL CARCINOMA OF EPIGLOTTIS: ICD-10-CM

## 2024-03-25 DIAGNOSIS — I73.9 PERIPHERAL VASCULAR DISEASE: ICD-10-CM

## 2024-03-25 DIAGNOSIS — C34.91 PRIMARY LUNG CANCER, RIGHT: ICD-10-CM

## 2024-03-25 DIAGNOSIS — C61 PROSTATE CANCER: ICD-10-CM

## 2024-03-25 DIAGNOSIS — R39.12 BENIGN PROSTATIC HYPERPLASIA WITH WEAK URINARY STREAM: ICD-10-CM

## 2024-03-25 DIAGNOSIS — J43.1 PANLOBULAR EMPHYSEMA: ICD-10-CM

## 2024-03-25 DIAGNOSIS — F17.210 CIGARETTE SMOKER: ICD-10-CM

## 2024-03-25 DIAGNOSIS — I25.10 CORONARY ARTERY DISEASE INVOLVING NATIVE HEART WITHOUT ANGINA PECTORIS, UNSPECIFIED VESSEL OR LESION TYPE: ICD-10-CM

## 2024-03-25 DIAGNOSIS — I48.21 PERMANENT ATRIAL FIBRILLATION: ICD-10-CM

## 2024-03-25 DIAGNOSIS — Z13.29 SCREENING FOR THYROID DISORDER: ICD-10-CM

## 2024-03-25 DIAGNOSIS — E78.5 DYSLIPIDEMIA: ICD-10-CM

## 2024-03-25 DIAGNOSIS — J96.01 ACUTE RESPIRATORY FAILURE WITH HYPOXIA: Primary | ICD-10-CM

## 2024-03-25 LAB
BILIRUB BLD-MCNC: ABNORMAL MG/DL
CLARITY, POC: ABNORMAL
COLOR UR: ABNORMAL
EXPIRATION DATE: NORMAL
GLUCOSE BLDC GLUCOMTR-MCNC: 107 MG/DL (ref 70–130)
GLUCOSE UR STRIP-MCNC: NEGATIVE MG/DL
HBA1C MFR BLD: 5.6 % (ref 4.5–5.7)
KETONES UR QL: ABNORMAL
LEUKOCYTE EST, POC: ABNORMAL
Lab: NORMAL
NITRITE UR-MCNC: POSITIVE MG/ML
PH UR: 5 [PH] (ref 5–8)
POC CREATININE URINE: 300
POC MICROALBUMIN URINE: 150
PROT UR STRIP-MCNC: ABNORMAL MG/DL
RBC # UR STRIP: ABNORMAL /UL
SP GR UR: 1.03 (ref 1–1.03)
UROBILINOGEN UR QL: ABNORMAL

## 2024-03-25 RX ORDER — ASPIRIN 81 MG/1
81 TABLET, CHEWABLE ORAL DAILY
COMMUNITY

## 2024-03-25 RX ORDER — CEFDINIR 300 MG/1
300 CAPSULE ORAL 2 TIMES DAILY
Qty: 10 CAPSULE | Refills: 0 | Status: SHIPPED | OUTPATIENT
Start: 2024-03-25 | End: 2024-03-25

## 2024-03-25 RX ORDER — TAMSULOSIN HYDROCHLORIDE 0.4 MG/1
1 CAPSULE ORAL DAILY
Qty: 90 CAPSULE | Refills: 1 | Status: SHIPPED | OUTPATIENT
Start: 2024-03-25

## 2024-03-25 RX ORDER — TAMSULOSIN HYDROCHLORIDE 0.4 MG/1
1 CAPSULE ORAL DAILY
Qty: 90 CAPSULE | Refills: 1 | Status: SHIPPED | OUTPATIENT
Start: 2024-03-25 | End: 2024-03-25

## 2024-03-25 RX ORDER — CEFDINIR 300 MG/1
300 CAPSULE ORAL 2 TIMES DAILY
Qty: 10 CAPSULE | Refills: 0 | Status: SHIPPED | OUTPATIENT
Start: 2024-03-25 | End: 2024-03-30

## 2024-03-25 NOTE — PROGRESS NOTES
Venipuncture Blood Specimen Collection  Venipuncture performed in right arm by Erin Gutierrez MA with good hemostasis. Patient tolerated the procedure well without complications.   03/25/24   Erin Gutierrez MA

## 2024-03-25 NOTE — ASSESSMENT & PLAN NOTE
Longstanding cigarette smoker, currently smoking 2 to 3 cigarettes daily, advised to discontinue entirely for health risk reduction.

## 2024-03-25 NOTE — PROGRESS NOTES
Follow Up Office Visit      Date: 2024   Patient Name: Stephane Noe  : 1950   MRN: [id number]     Chief Complaint:    Chief Complaint   Patient presents with    Hospital Follow Up Visit       History of Present Illness: Stephane Noe is a 73 y.o. male who is here today for visit, last in the office on 11/15/2023.  History of prostate, lung, and supraglottic cancer all of which have been treated and stable.  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, had been planned for laminectomy and resection in early 2024, noted at that time to have acute respiratory failure and was admitted to [facility] in [place] where he had a prolonged hospitalization from 2024 through 2024, with hospital course complicated by A-fib with RVR treated with Cardizem drip and Lovenox, switched ultimately to Eliquis, severe encephalopathy requiring ICU admission and pulmonary consultation, also agitation requiring psychiatry evaluation, treated transiently with Seroquel and Valium, also treated for bilateral pneumonia with a right perihilar opacity, history of adenocarcinoma of the lung, history of COPD, longstanding heavy cigarette smoker most recently having reduced down to 2 to 3 cigarettes daily.  While in hospital with his encephalopathy had difficulty with swallowing, after resolution of his encephalopathy having a barium swallow that reportedly did not reveal any aspiration.  Patient was felt to be clinically stable to be discharged home on 3/6/2024 with plan for outpatient follow-up with neurosurgery on 3/20/2024 occurring last week, and now with plan to pursue previously planned laminectomy next month.  Patient also had developed some discoloration of his right second through fourth toes noting history of significant peripheral vascular disease and previous right great toe amputation, prompting a local  ER visit on 3/11/2024 with hyperemia noted but no sign of actual infection or gangrene.  He was discharged home on usual care.  Feels like his toes are not a problem at this time.  Acute concerns relate to 1 month of urinary frequency urgency decreased stream and also intermittent small blood clots noted in his urine with slight dysuria.  Breathing is stable with dyspnea on exertion no chest pains or palpitations, no edema, no abdominal pain, no nausea normal bowel function noted    Subjective      Review of Systems:   Review of Systems    I have reviewed the patients family history, social history, past medical history, past surgical history and have updated it as appropriate.     Medications:     Current Outpatient Medications:     atorvastatin (LIPITOR) 80 MG tablet, Take 1 tablet by mouth Daily., Disp: 90 tablet, Rfl: 1    cefdinir (OMNICEF) 300 MG capsule, Take 1 capsule by mouth 2 (Two) Times a Day for 5 days., Disp: 10 capsule, Rfl: 0    magnesium oxide (MAG-OX) 400 MG tablet, Take 1 tablet by mouth Daily., Disp: , Rfl:     metFORMIN ER (GLUCOPHAGE-XR) 500 MG 24 hr tablet, TAKE 4 TABLETS EVERY EVENING WITH MEAL FOR DIABETES (Patient taking differently: 1 tablet. Two in evening), Disp: 360 tablet, Rfl: 2    metoprolol succinate XL (TOPROL-XL) 25 MG 24 hr tablet, Take 1/2 pill daily, Disp: 45 tablet, Rfl: 1    sildenafil (VIAGRA) 100 MG tablet, Take 1 tablet by mouth Daily As Needed for Erectile Dysfunction., Disp: , Rfl:     tamsulosin (FLOMAX) 0.4 MG capsule 24 hr capsule, Take 1 capsule by mouth Daily., Disp: 90 capsule, Rfl: 1    aspirin 81 MG chewable tablet, Chew 1 tablet Daily., Disp: , Rfl:     Allergies:   Allergies   Allergen Reactions    Lisinopril Cough       Objective     Physical Exam: Please see above  Vital Signs:   Vitals:    03/25/24 0917   BP: 96/64   BP Location: Left arm   Patient Position: Sitting   Cuff Size: Adult   Pulse: 90   Temp: 98.1 °F (36.7 °C)   TempSrc: Temporal   SpO2: 96%  "  Weight: 83.5 kg (184 lb)   Height: 182.9 cm (72\")     Body mass index is 24.95 kg/m².  BMI is within normal parameters. No other follow-up for BMI required.       Physical Exam  Constitutional:       General: He is not in acute distress.     Appearance: He is not ill-appearing, toxic-appearing or diaphoretic.      Comments: Chronically but not acutely ill-appearing, significant weight loss of 17 pounds in the last 4 months, BMI 24.9   Cardiovascular:      Rate and Rhythm: Rhythm irregular.      Heart sounds: No murmur heard.     No friction rub. No gallop.   Pulmonary:      Effort: Pulmonary effort is normal. No respiratory distress.      Breath sounds: Rhonchi present. No wheezing or rales.      Comments: Scattered rhonchi at baseline, no tachypnea dyspnea or cough, fair airflow  Abdominal:      General: Bowel sounds are normal. There is no distension.      Palpations: Abdomen is soft. There is no mass.      Tenderness: There is no abdominal tenderness. There is no guarding.   Musculoskeletal:      Cervical back: No tenderness.      Right lower leg: No edema.      Left lower leg: No edema.      Comments: Bipedal exam with hyperemia, 2-second capillary perfusion, no ulceration, slight dark discoloration of his right second dorsal toe almost resembling a bruise, nontender, sensation to fine touch intact, motor exam normal   Lymphadenopathy:      Cervical: No cervical adenopathy.   Skin:     Findings: Erythema present.      Comments: Hyperemia of his feet, warm to touch, no skin breakdown, 2-second capillary perfusion darker discoloration over the dorsal right second toe almost resembling a bruise   Neurological:      Mental Status: He is alert and oriented to person, place, and time.     Diabetic Foot Exam Performed and Monofilament Test Performed        ECG 12 Lead    Date/Time: 3/25/2024 11:07 AM  Performed by: Nain Morel MD    Authorized by: Nain Morel MD  Comparison: compared with previous ECG from " 1/5/2023  Similar to previous ECG  Comparison to previous ECG: Atrial fibrillation rate 105, single PVC, no acute ischemic change noted, no significant change versus previous EKG available          Results:   Labs:   Hemoglobin A1C   Date Value Ref Range Status   03/25/2024 5.6 4.5 - 5.7 % Final   02/27/2023 5.00 4.80 - 5.60 % Final     TSH   Date Value Ref Range Status   01/02/2024 4.420 (H) 0.358 - 3.740 uIU/mL Final        POCT Results (if applicable):   Results for orders placed or performed in visit on 03/25/24   POC Glycosylated Hemoglobin (Hb A1C)    Specimen: Blood   Result Value Ref Range    Hemoglobin A1C 5.6 4.5 - 5.7 %    Lot Number [id number]     Expiration Date 11/27/2025    POC Glucose, Blood    Specimen: Blood   Result Value Ref Range    Glucose 107 70 - 130 mg/dL    Lot Number [id number]     Expiration Date 06/30/2024    POC Urinalysis Dipstick    Specimen: Urine   Result Value Ref Range    Color Red (A) Yellow, Straw, Dark Yellow, Anna    Clarity, UA Hazy (A) Clear    Glucose, UA Negative Negative mg/dL    Bilirubin Moderate (2+) (A) Negative    Ketones, UA 15 mg/dL (A) Negative    Specific Gravity  1.030 1.005 - 1.030    Blood, UA 3+ (A) Negative    pH, Urine 5.0 5.0 - 8.0    Protein, POC 3+ (A) Negative mg/dL    Urobilinogen, UA 2.0 E.U./dL (A) Normal, 0.2 E.U./dL    Leukocytes Large (3+) (A) Negative    Nitrite, UA Positive (A) Negative   POC Microalbumin    Specimen: Urine   Result Value Ref Range    Microalbumin, Urine 150     Creatinine, Urine 300     Lot Number [id number]     Expiration Date 07/04/2024        Imaging:  [facility]:    Single view abdominal x-ray from 3/1/2024 with unremarkable bowel gas pattern    Echocardiogram 1/2/2024:   Findings:    Normal sized left ventricle.    Mild left ventricular hypertrophy.    Visually estimated ejection fraction 50% +/- 5%.    Normal left ventricular systolic function .    Indeterminate diastolic function.    Dilated  right ventricle.    Abnormal TAPSE; abnormal right ventricular function.    -Unable to rule out McConnells sign.    No hemodynamically significant valvular heart disease.     CT chest angiogram 1/2/2024:  1.  No evidence of pulmonary embolism.   2.  There are dense consolidative changes involving the right perihilar   region, and dense consolidative changes in the bilateral lower lobes.   There are diffuse air bronchograms but these are best seen peripherally,   and findings appear to reflect a combination of neoplasm/fibrotic   changes and postobstructive changes, and likely acute infectious   process.   3. Cardiomegaly, and dense coronary artery atherosclerotic disease.   4.  Thoracic aorta is at the upper limits of normal in size measuring   4.4 cm at its origin.   5.  Main pulmonary artery is dilated measuring 3.3 cm in maximal   dimension, which may represent pulmonary arterial hypertension.    CT abdomen pelvis with and without contrast 1/2/2024:  1.  No definite acute findings within the abdomen/pelvis. There is   bilateral perinephric stranding, which is nonspecific.   2.  Pararenal abdominal aortic aneurysm measuring 3.4 cm in maximal   dimension.   3. There are also aneurysms of both common iliacs, and of the proximal   internal iliac arteries, as described above.     Most recent laboratory testing from [facility] 3/3/2024:  White count 4.1, hemoglobin 12.2, MCV 99, platelets 152,000  Sodium 138, potassium 4.0, chloride 107, CO2 29, BUN 23, creatinine 0.88, calcium 9.4, glucose 88, GFR greater than 60    Assessment / Plan      Assessment/Plan:   Diagnoses and all orders for this visit:    1. Acute respiratory failure with hypoxia (Primary)  Assessment & Plan:  History of extended hospital stay at [facility] hospital from 1/2/2024 through 3/6/2024 for acute respiratory failure on a background pattern of COPD.  Clinically stable at this time on no related respiratory meds.      2.  Panlobular emphysema  Assessment & Plan:  Stable on no current respiratory meds.  Continues smoke 2 to 3 cigarettes daily.      3. Coronary artery disease involving native heart without angina pectoris, unspecified vessel or lesion type  Assessment & Plan:  Asymptomatic from a cardiovascular standpoint.  History of noncritical CAD per [facility] in 10/2017, associated permanent A-fib, asymptomatic currently on Eliquis and metoprolol.  Discontinue Eliquis given his 1 months history of gross hematuria, switching to aspirin 81 mg daily.    Orders:  -     ECG 12 Lead    4. Permanent atrial fibrillation  Assessment & Plan:  Permanent A-fib, currently on Eliquis 5 mg twice daily which we will discontinue given 1 month by history of gross hematuria, switching to aspirin 81 mg daily, and metoprolol XL 25 mg daily.  Mildly elevated ventricular rate.  Continue current regimen for now.    Orders:  -     ECG 12 Lead    5. Peripheral vascular disease  Assessment & Plan:  Chronic PVD status post previous right great toe amputation, warm extremities with slightly delayed capillary perfusion, no current ulceration, continue risk factor modification.      6. Type 2 diabetes mellitus with diabetic polyneuropathy, without long-term current use of insulin  Assessment & Plan:  Previously treated with metformin, currently hemoglobin A1c normal at 5.6 today, on no related meds, undoubtedly secondary to his profound weight loss.  Does have mild microalbuminuria but given low blood pressures, will hold off on any ACE inhibitor or ARB at this time.  Check blood sugars periodically.    Orders:  -     POC Glycosylated Hemoglobin (Hb A1C)  -     POC Glucose, Blood  -     POC Microalbumin  -     Comprehensive Metabolic Panel; Future  -     Cancel: MicroAlbumin, Urine, Random - Urine, Clean Catch; Future  -     Comprehensive Metabolic Panel    7. Primary hypertension  Assessment & Plan:  Previous history of hypertension, currently on only metoprolol XL  25 mg daily for A-fib rate control.  Low normal blood pressure today.  Continue current regimen with monitoring.  Start checking blood pressures with ideal parameters discussed.    Orders:  -     POC Urinalysis Dipstick  -     Comprehensive Metabolic Panel; Future  -     Comprehensive Metabolic Panel    8. Dyslipidemia  Assessment & Plan:  Previously on atorvastatin 80 mg nightly, by history no longer taking.  Update lipid profile and make further recommendation accordingly.    Orders:  -     Lipid Panel; Future  -     Lipid Panel    9. Benign prostatic hyperplasia with weak urinary stream  Assessment & Plan:  History of previous prostate cancer treated by Dr. Anand Watson.  Currently having symptoms suggestive of BPH with urgency frequency incontinence and diminished stream.  Having associated subacute gross hematuria with leukocytes and nitrates on ua. treating with cefdinir empirically pending urine culture results.  Start Flomax 0.4 mg daily.  Assess clinical response.    Orders:  -     POC Urinalysis Dipstick  -     Discontinue: tamsulosin (FLOMAX) 0.4 MG capsule 24 hr capsule; Take 1 capsule by mouth Daily.  Dispense: 90 capsule; Refill: 1  -     tamsulosin (FLOMAX) 0.4 MG capsule 24 hr capsule; Take 1 capsule by mouth Daily.  Dispense: 90 capsule; Refill: 1    10. Anemia, unspecified type    11. Gross hematuria  -     POC Urinalysis Dipstick  -     CBC & Differential; Future  -     Urinalysis With Culture If Indicated -; Future  -     Discontinue: cefdinir (OMNICEF) 300 MG capsule; Take 1 capsule by mouth 2 (Two) Times a Day for 5 days.  Dispense: 10 capsule; Refill: 0  -     cefdinir (OMNICEF) 300 MG capsule; Take 1 capsule by mouth 2 (Two) Times a Day for 5 days.  Dispense: 10 capsule; Refill: 0  -     Urinalysis With Culture If Indicated - Urine, Clean Catch  -     CBC & Differential    12. Primary lung cancer, right  Assessment & Plan:  History of primary lung cancer on the right followed by   [facility], in remission per most recent previous evaluation.  Did have some abnormality on CT scan during recent hospitalization with associated pneumonia.  Patient to follow-up with Dr. Ray.         13. Prostate cancer  Assessment & Plan:  History of prostate cancer status post radiotherapy by Dr. Watson of urology.  Having symptoms suggestive of BPH with no evidence of prostate enlargement on his exam.  Treating with Flomax as noted.      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 Dolan.      15. Cigarette smoker  Assessment & Plan:  Longstanding cigarette smoker, currently smoking 2 to 3 cigarettes daily, advised to discontinue entirely for health risk reduction.      16. ETOH abuse  Assessment & Plan:  Chronic alcohol abuse, consuming at least 2 shots of whiskey daily.  Advised of health risks and need to discontinue.      17. Screening for thyroid disorder  -     TSH Rfx On Abnormal To Free T4; Future  -     TSH Rfx On Abnormal To Free T4    55 minutes spent on patient reviewing his extensive medical records including prolonged hospitalization, more recent ER evaluation, current history, physical exam, formulation of treatment plan and documentation in EMR.    Follow Up:   Return in about 2 weeks (around 4/8/2024) for Medicare Subsq., Labs today.      At [facility], we believe that sharing information builds trust and better relationships. You are receiving this note because you recently visited [facility]. It is possible you will see health information before a provider has talked with you about it. This kind of information can be easy to misunderstand. To help you fully understand what it means for your health, we urge you to discuss this note with your provider.    Nain Morel MD  [facility]

## 2024-03-25 NOTE — ASSESSMENT & PLAN NOTE
Chronic alcohol abuse, consuming at least 2 shots of whiskey daily.  Advised of health risks and need to discontinue.   2

## 2024-03-25 NOTE — ASSESSMENT & PLAN NOTE
Previous history of hypertension, currently on only metoprolol XL 25 mg daily for A-fib rate control.  Low normal blood pressure today.  Continue current regimen with monitoring.  Start checking blood pressures with ideal parameters discussed.

## 2024-03-25 NOTE — ASSESSMENT & PLAN NOTE
Previously on atorvastatin 80 mg nightly, by history no longer taking.  Update lipid profile and make further recommendation accordingly.

## 2024-03-25 NOTE — ASSESSMENT & PLAN NOTE
History of primary lung cancer on the right followed by Dr. Ray, in remission per most recent previous evaluation.  Did have some abnormality on CT scan during recent hospitalization with associated pneumonia.  Patient to follow-up with Dr. Ray.

## 2024-03-25 NOTE — ASSESSMENT & PLAN NOTE
Previously treated with metformin, currently hemoglobin A1c normal at 5.6 today, on no related meds, undoubtedly secondary to his profound weight loss.  Does have mild microalbuminuria but given low blood pressures, will hold off on any ACE inhibitor or ARB at this time.  Check blood sugars periodically.

## 2024-03-25 NOTE — ASSESSMENT & PLAN NOTE
History of prostate cancer status post radiotherapy by Dr. Watson of urology.  Having symptoms suggestive of BPH with no evidence of prostate enlargement on his exam.  Treating with Flomax as noted.

## 2024-03-25 NOTE — ASSESSMENT & PLAN NOTE
Asymptomatic from a cardiovascular standpoint.  History of noncritical CAD per [facility] in 10/2017, associated permanent A-fib, asymptomatic currently on Eliquis and metoprolol.  Discontinue Eliquis given his 1 months history of gross hematuria, switching to aspirin 81 mg daily.

## 2024-03-25 NOTE — ASSESSMENT & PLAN NOTE
Permanent A-fib, currently on Eliquis 5 mg twice daily which we will discontinue given 1 month by history of gross hematuria, switching to aspirin 81 mg daily, and metoprolol XL 25 mg daily.  Mildly elevated ventricular rate.  Continue current regimen for now.

## 2024-03-25 NOTE — ASSESSMENT & PLAN NOTE
History of previous prostate cancer treated by Dr. Anand Watson.  Currently having symptoms suggestive of BPH with urgency frequency incontinence and diminished stream.  Having associated subacute gross hematuria with leukocytes and nitrates on ua. treating with cefdinir empirically pending urine culture results.  Start Flomax 0.4 mg daily.  Assess clinical response.

## 2024-03-25 NOTE — ASSESSMENT & PLAN NOTE
Chronic PVD status post previous right great toe amputation, warm extremities with slightly delayed capillary perfusion, no current ulceration, continue risk factor modification.

## 2024-03-25 NOTE — ASSESSMENT & PLAN NOTE
History of extended hospital stay at [facility] hospital from 1/2/2024 through 3/6/2024 for acute respiratory failure on a background pattern of COPD.  Clinically stable at this time on no related respiratory meds.

## 2024-03-26 ENCOUNTER — OFFICE VISIT (OUTPATIENT)
Dept: CARDIOLOGY | Facility: CLINIC | Age: 74
End: 2024-03-26
Payer: MEDICARE

## 2024-03-26 VITALS
WEIGHT: 185 LBS | DIASTOLIC BLOOD PRESSURE: 50 MMHG | HEIGHT: 72 IN | OXYGEN SATURATION: 95 % | HEART RATE: 89 BPM | SYSTOLIC BLOOD PRESSURE: 86 MMHG | BODY MASS INDEX: 25.06 KG/M2

## 2024-03-26 DIAGNOSIS — I95.2 HYPOTENSION DUE TO DRUGS: ICD-10-CM

## 2024-03-26 DIAGNOSIS — I48.21 PERMANENT ATRIAL FIBRILLATION: ICD-10-CM

## 2024-03-26 LAB
ALBUMIN SERPL-MCNC: 3.5 G/DL (ref 3.8–4.8)
ALBUMIN/GLOB SERPL: 1.3 {RATIO} (ref 1.2–2.2)
ALP SERPL-CCNC: 127 IU/L (ref 44–121)
ALT SERPL-CCNC: 6 IU/L (ref 0–44)
AST SERPL-CCNC: 10 IU/L (ref 0–40)
BASOPHILS # BLD AUTO: 0 X10E3/UL (ref 0–0.2)
BASOPHILS NFR BLD AUTO: 1 %
BILIRUB SERPL-MCNC: 0.8 MG/DL (ref 0–1.2)
BUN SERPL-MCNC: 10 MG/DL (ref 8–27)
BUN/CREAT SERPL: 13 (ref 10–24)
CALCIUM SERPL-MCNC: 9.1 MG/DL (ref 8.6–10.2)
CHLORIDE SERPL-SCNC: 105 MMOL/L (ref 96–106)
CHOLEST SERPL-MCNC: 151 MG/DL (ref 100–199)
CO2 SERPL-SCNC: 22 MMOL/L (ref 20–29)
CREAT SERPL-MCNC: 0.8 MG/DL (ref 0.76–1.27)
EGFRCR SERPLBLD CKD-EPI 2021: 93 ML/MIN/1.73
EOSINOPHIL # BLD AUTO: 0.1 X10E3/UL (ref 0–0.4)
EOSINOPHIL NFR BLD AUTO: 1 %
ERYTHROCYTE [DISTWIDTH] IN BLOOD BY AUTOMATED COUNT: 13.6 % (ref 11.6–15.4)
GLOBULIN SER CALC-MCNC: 2.8 G/DL (ref 1.5–4.5)
GLUCOSE SERPL-MCNC: 94 MG/DL (ref 70–99)
HCT VFR BLD AUTO: 39.1 % (ref 37.5–51)
HDLC SERPL-MCNC: 49 MG/DL
HGB BLD-MCNC: 13.3 G/DL (ref 13–17.7)
IMM GRANULOCYTES # BLD AUTO: 0 X10E3/UL (ref 0–0.1)
IMM GRANULOCYTES NFR BLD AUTO: 1 %
LDLC SERPL CALC-MCNC: 83 MG/DL (ref 0–99)
LYMPHOCYTES # BLD AUTO: 1.1 X10E3/UL (ref 0.7–3.1)
LYMPHOCYTES NFR BLD AUTO: 14 %
MCH RBC QN AUTO: 33.5 PG (ref 26.6–33)
MCHC RBC AUTO-ENTMCNC: 34 G/DL (ref 31.5–35.7)
MCV RBC AUTO: 99 FL (ref 79–97)
MONOCYTES # BLD AUTO: 0.4 X10E3/UL (ref 0.1–0.9)
MONOCYTES NFR BLD AUTO: 6 %
NEUTROPHILS # BLD AUTO: 6.1 X10E3/UL (ref 1.4–7)
NEUTROPHILS NFR BLD AUTO: 77 %
PLATELET # BLD AUTO: 209 X10E3/UL (ref 150–450)
POTASSIUM SERPL-SCNC: 3.8 MMOL/L (ref 3.5–5.2)
PROT SERPL-MCNC: 6.3 G/DL (ref 6–8.5)
RBC # BLD AUTO: 3.97 X10E6/UL (ref 4.14–5.8)
SODIUM SERPL-SCNC: 143 MMOL/L (ref 134–144)
TRIGL SERPL-MCNC: 103 MG/DL (ref 0–149)
TSH SERPL DL<=0.005 MIU/L-ACNC: 3.71 UIU/ML (ref 0.45–4.5)
VLDLC SERPL CALC-MCNC: 19 MG/DL (ref 5–40)
WBC # BLD AUTO: 7.7 X10E3/UL (ref 3.4–10.8)
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
## 2024-03-28 ENCOUNTER — TELEPHONE (OUTPATIENT)
Dept: FAMILY MEDICINE CLINIC | Facility: CLINIC | Age: 74
End: 2024-03-28
Payer: MEDICARE

## 2024-03-28 DIAGNOSIS — R31.0 GROSS HEMATURIA: Primary | ICD-10-CM

## 2024-03-28 LAB
APPEARANCE UR: ABNORMAL
BACTERIA #/AREA URNS HPF: ABNORMAL /[HPF]
BACTERIA UR CULT: ABNORMAL
CASTS URNS QL MICRO: ABNORMAL /LPF
COLOR UR: ABNORMAL
EPI CELLS #/AREA URNS HPF: >10 /HPF (ref 0–10)
GLUCOSE UR QL STRIP: ABNORMAL
KETONES UR QL STRIP: ABNORMAL
MICRO URNS: ABNORMAL
MUCOUS THREADS URNS QL MICRO: PRESENT
OTHER ANTIBIOTIC SUSC ISLT: ABNORMAL
PH UR STRIP: ABNORMAL [PH]
PROT UR QL STRIP: ABNORMAL
RBC #/AREA URNS HPF: >30 /HPF (ref 0–2)
SP GR UR STRIP: ABNORMAL
URINALYSIS REFLEX: ABNORMAL
WBC #/AREA URNS HPF: >30 /HPF (ref 0–5)

## 2024-03-28 NOTE — TELEPHONE ENCOUNTER
Review of testing from 3/25/2024 as follows:    CBC with no significant abnormalities, hemoglobin 13.3  Total cholesterol 151, triglyceride 103, HDL 49, LDL 83  CMP unremarkable, specifically noting his creatinine is 0.80 with GFR 93  TSH 3.71, normal  Urine microalbumin elevated at 150  Hemoglobin A1c 5.6%  Urinalysis with red turbid urine, microscopic analysis with greater than 30 WBCs, greater than 30 RBCs, greater than 10 epithelial cells, no bacteria noted, subsequent culture growing 1000 colonies of Klebsiella pneumoniae, sensitive to all tested antibiotics except ampicillin, specifically sensitive to cephalosporins prescribed to patient    Most recent CT abdomen and pelvis from 1/2/2024 while hospitalized reveals bilateral perinephric stranding nonspecifically, no obvious renal abnormalities, aneurysms of the aorta and bilateral common iliacs, but no acute findings noted    Assessment/plan:  1.  Gross hematuria with insignificant colony count of Klebsiella pneumonia growing in the urine.  Patient recently had discontinuation of his Eliquis switching to aspirin given his gross hematuria, not clearly having any evidence of infectious process to account, CT of the abdomen pelvis in 1/2024 with no acute renal abnormalities of concern noted, previous patient of Dr. Anand Melendez of urology who apparently is retiring.  Will discuss with patient need for urological evaluation.  He does have a history of prostate, lung, and oropharyngeal cancers all of which have historically been in remission, followed by Dr. Shawn Ray.  2.  Remainder of laboratory testing satisfactory.  3.  Unsuccessful attempt to contact patient, with message left on answering machine.  Will attempt again tomorrow.    3/29/2024:  Phone conversation with patient regarding above findings recommendation.  Patient does note that the gross hematuria has stopped, noting we had stopped his Eliquis and restarted aspirin pending further investigations  of his hematuria.  He indicates that he actually has an appointment to see Dr. Anand Watson in 2 months, but I advised that he needs to be seen more probably given his recent gross hematuria.  We will contact Dr. Watson's office to see if we can get an appointment more promptly to see the patient.  I advised patient he should hear from either our office or Dr. Watson's office within the next week regarding this appointment and if not then to notify our office.

## 2024-03-29 ENCOUNTER — TELEPHONE (OUTPATIENT)
Dept: FAMILY MEDICINE CLINIC | Facility: CLINIC | Age: 74
End: 2024-03-29
Payer: MEDICARE

## 2024-04-13 ENCOUNTER — READMISSION MANAGEMENT (OUTPATIENT)
Dept: CALL CENTER | Facility: HOSPITAL | Age: 74
End: 2024-04-13
Payer: MEDICARE

## 2024-04-13 NOTE — OUTREACH NOTE
Prep Survey      Flowsheet Row Responses   [facility] facility patient discharged from? Non-BH   Is LACE score < 7 ? Non-BH Discharge   Eligibility Not Eligible   What are the reasons patient is not eligible? Other  [no recent hospital admission]   Does the patient have one of the following disease processes/diagnoses(primary or secondary)? Other   Prep survey completed? Yes            Aby CASTRO - Registered Nurse

## 2024-04-14 ENCOUNTER — READMISSION MANAGEMENT (OUTPATIENT)
Dept: CALL CENTER | Facility: HOSPITAL | Age: 74
End: 2024-04-14
Payer: MEDICARE

## 2024-04-14 NOTE — OUTREACH NOTE
Prep Survey      Flowsheet Row Responses   [facility] facility patient discharged from? Non-BH   Is LACE score < 7 ? Non- Discharge   Eligibility TCM Hospital [facility]   Date of Discharge 04/13/24   Discharge Disposition Home or Self Care   Discharge diagnosis Radiculopathy, lumbar region   Does the patient have one of the following disease processes/diagnoses(primary or secondary)? Other   Prep survey completed? Yes            Lavinia CORCORAN - Registered Nurse

## 2024-04-15 ENCOUNTER — TRANSITIONAL CARE MANAGEMENT TELEPHONE ENCOUNTER (OUTPATIENT)
Dept: CALL CENTER | Facility: HOSPITAL | Age: 74
End: 2024-04-15
Payer: MEDICARE

## 2024-04-15 NOTE — OUTREACH NOTE
Call Center TCM Note      Flowsheet Row Responses   [facility] patient discharged from? Non-  [[facility]]   Does the patient have one of the following disease processes/diagnoses(primary or secondary)? Other   TCM attempt successful? No   Unsuccessful attempts Attempt 2            Leslie Brown LPN    4/15/2024, 15:38 EDT

## 2024-04-15 NOTE — OUTREACH NOTE
Call Center TCM Note      Flowsheet Row Responses   [facility] patient discharged from? Non-BH   Does the patient have one of the following disease processes/diagnoses(primary or secondary)? Other   TCM attempt successful? No   Unsuccessful attempts Attempt 1            Leslie Brown LPN    4/15/2024, 08:13 EDT

## 2024-04-16 ENCOUNTER — TELEPHONE (OUTPATIENT)
Dept: FAMILY MEDICINE CLINIC | Facility: CLINIC | Age: 74
End: 2024-04-16
Payer: MEDICARE

## 2024-04-16 ENCOUNTER — TRANSITIONAL CARE MANAGEMENT TELEPHONE ENCOUNTER (OUTPATIENT)
Dept: CALL CENTER | Facility: HOSPITAL | Age: 74
End: 2024-04-16
Payer: MEDICARE

## 2024-04-16 NOTE — TELEPHONE ENCOUNTER
"Angelina, home health nurse called and informed of normal BP. Mr. Noe voiced his pain level was \"100\", he has had several falls in the last month but nothing resulting in an injury. Gave verbal order for labs and to start PT. Informed nurse to remind pt of upcoming follow up appt as well.   "

## 2024-04-16 NOTE — OUTREACH NOTE
"Call Center TCM Note      Flowsheet Row Responses   [facility] patient discharged from? Non-   Does the patient have one of the following disease processes/diagnoses(primary or secondary)? Other   TCM attempt successful? Yes  [VR is blank]   Call start time 1245   Call end time 1309   Discharge diagnosis Radiculopathy, lumbar region   Medication comments Pt reports that he saw Urology 4-15-24 and was to have meds called into pharmacy but [facility] does not have meds.Pt is unsure of MD number, RN looked Dr. Fan's number and gave to pt.   Comments Pt eligible for TCM appt. Pt has Wellness appt sched for 4-24-24, prefers not to chg appt   Does the patient have an appointment with their PCP within 7-14 days of discharge? Other   Nursing Interventions Patient declined scheduling/rescheduling appointment at this time, Routed TCM call to PCP office   What is the Home health agency?  Home health has seen pt today, pt reports   DME comments Pt uses a walker for balance, since surgery, pt reports.   Comments Pain is making it difficult for walking r/t pain, pt reports.Pt currently reports that his pain is 10/10, with his pain meds, there is no relief, he reports pain stays at 10/10, at incision, sharp/aching/throbbing in nature.Pt is eating, drinking voiding without issue, pt states that he urinates \"all the time.\" Pt reports that it is difficult to sleep r/t his pain. Pt reports dressing on incisions on his back, today  nurse evaluated for issues, pt reports no issues. Pt is alone at this time.   What is the patient's perception of their health status since discharge? Improving   Is the patient/caregiver able to teach back signs and symptoms related to disease process for when to call PCP? Yes   Is the patient/caregiver able to teach back signs and symptoms related to disease process for when to call 911? Yes   If the patient is a current smoker, are they able to teach back resources for cessation? --  [3-4cig/day] "   TCM call completed? Yes   Call end time [id number]            Gabriela Melton, RN    4/16/2024, 13:09 EDT

## 2024-04-23 PROBLEM — M48.062 LUMBAR STENOSIS WITH NEUROGENIC CLAUDICATION: Status: ACTIVE | Noted: 2024-04-11

## 2024-04-24 ENCOUNTER — OFFICE VISIT (OUTPATIENT)
Dept: FAMILY MEDICINE CLINIC | Facility: CLINIC | Age: 74
End: 2024-04-24
Payer: MEDICARE

## 2024-04-24 VITALS
SYSTOLIC BLOOD PRESSURE: 128 MMHG | OXYGEN SATURATION: 100 % | WEIGHT: 172.6 LBS | BODY MASS INDEX: 23.38 KG/M2 | DIASTOLIC BLOOD PRESSURE: 84 MMHG | TEMPERATURE: 97.2 F | HEART RATE: 82 BPM | HEIGHT: 72 IN

## 2024-04-24 DIAGNOSIS — E11.42 TYPE 2 DIABETES MELLITUS WITH DIABETIC POLYNEUROPATHY, WITHOUT LONG-TERM CURRENT USE OF INSULIN: ICD-10-CM

## 2024-04-24 DIAGNOSIS — N40.1 BENIGN PROSTATIC HYPERPLASIA WITH WEAK URINARY STREAM: ICD-10-CM

## 2024-04-24 DIAGNOSIS — C34.91 PRIMARY LUNG CANCER, RIGHT: ICD-10-CM

## 2024-04-24 DIAGNOSIS — I73.9 PERIPHERAL VASCULAR DISEASE: ICD-10-CM

## 2024-04-24 DIAGNOSIS — F17.210 CIGARETTE SMOKER: ICD-10-CM

## 2024-04-24 DIAGNOSIS — E55.9 VITAMIN D DEFICIENCY: ICD-10-CM

## 2024-04-24 DIAGNOSIS — R39.12 BENIGN PROSTATIC HYPERPLASIA WITH WEAK URINARY STREAM: ICD-10-CM

## 2024-04-24 DIAGNOSIS — M54.50 CHRONIC MIDLINE LOW BACK PAIN WITHOUT SCIATICA: ICD-10-CM

## 2024-04-24 DIAGNOSIS — D64.9 ANEMIA, UNSPECIFIED TYPE: ICD-10-CM

## 2024-04-24 DIAGNOSIS — J43.1 PANLOBULAR EMPHYSEMA: ICD-10-CM

## 2024-04-24 DIAGNOSIS — G89.29 CHRONIC MIDLINE LOW BACK PAIN WITHOUT SCIATICA: ICD-10-CM

## 2024-04-24 DIAGNOSIS — Z98.890 HISTORY OF LAMINECTOMY: ICD-10-CM

## 2024-04-24 DIAGNOSIS — I25.10 CORONARY ARTERY DISEASE INVOLVING NATIVE HEART WITHOUT ANGINA PECTORIS, UNSPECIFIED VESSEL OR LESION TYPE: ICD-10-CM

## 2024-04-24 DIAGNOSIS — I10 PRIMARY HYPERTENSION: ICD-10-CM

## 2024-04-24 DIAGNOSIS — D49.7 INTRADURAL EXTRAMEDULLARY SPINAL TUMOR: ICD-10-CM

## 2024-04-24 DIAGNOSIS — C61 PROSTATE CANCER: ICD-10-CM

## 2024-04-24 DIAGNOSIS — C10.9 MALIGNANT NEOPLASM OF OROPHARYNX: ICD-10-CM

## 2024-04-24 DIAGNOSIS — Z13.29 SCREENING FOR THYROID DISORDER: ICD-10-CM

## 2024-04-24 DIAGNOSIS — E78.5 DYSLIPIDEMIA: ICD-10-CM

## 2024-04-24 DIAGNOSIS — Z00.01 ENCOUNTER FOR GENERAL ADULT MEDICAL EXAMINATION WITH ABNORMAL FINDINGS: Primary | ICD-10-CM

## 2024-04-24 DIAGNOSIS — I48.21 PERMANENT ATRIAL FIBRILLATION: ICD-10-CM

## 2024-04-24 LAB
EXPIRATION DATE: NORMAL
EXPIRATION DATE: NORMAL
GLUCOSE BLDC GLUCOMTR-MCNC: 129 MG/DL (ref 70–130)
HBA1C MFR BLD: 5.1 % (ref 4.5–5.7)
Lab: NORMAL
Lab: NORMAL

## 2024-04-24 PROCEDURE — G0439 PPPS, SUBSEQ VISIT: HCPCS | Performed by: INTERNAL MEDICINE

## 2024-04-24 PROCEDURE — 1160F RVW MEDS BY RX/DR IN RCRD: CPT | Performed by: INTERNAL MEDICINE

## 2024-04-24 PROCEDURE — 3044F HG A1C LEVEL LT 7.0%: CPT | Performed by: INTERNAL MEDICINE

## 2024-04-24 PROCEDURE — 3074F SYST BP LT 130 MM HG: CPT | Performed by: INTERNAL MEDICINE

## 2024-04-24 PROCEDURE — 36415 COLL VENOUS BLD VENIPUNCTURE: CPT | Performed by: INTERNAL MEDICINE

## 2024-04-24 PROCEDURE — 82947 ASSAY GLUCOSE BLOOD QUANT: CPT | Performed by: INTERNAL MEDICINE

## 2024-04-24 PROCEDURE — 1159F MED LIST DOCD IN RCRD: CPT | Performed by: INTERNAL MEDICINE

## 2024-04-24 PROCEDURE — 3079F DIAST BP 80-89 MM HG: CPT | Performed by: INTERNAL MEDICINE

## 2024-04-24 PROCEDURE — 1170F FXNL STATUS ASSESSED: CPT | Performed by: INTERNAL MEDICINE

## 2024-04-24 PROCEDURE — 99214 OFFICE O/P EST MOD 30 MIN: CPT | Performed by: INTERNAL MEDICINE

## 2024-04-24 RX ORDER — TRAMADOL HYDROCHLORIDE 50 MG/1
50 TABLET ORAL EVERY 6 HOURS PRN
Qty: 12 TABLET | Refills: 0 | Status: SHIPPED | OUTPATIENT
Start: 2024-04-24

## 2024-04-24 RX ORDER — ALBUTEROL SULFATE 90 UG/1
2 AEROSOL, METERED RESPIRATORY (INHALATION) EVERY 4 HOURS PRN
COMMUNITY

## 2024-04-24 NOTE — ASSESSMENT & PLAN NOTE
Status post L1-L2 laminectomy on 4/11/2024 at [facility] given note of intradural extramedullary tumor, pathology currently pending.  To follow-up with neurosurgery on 4/29/2024.

## 2024-04-24 NOTE — ASSESSMENT & PLAN NOTE
Currently being managed only on metoprolol 25 mg at 0.5 tablets of 12.5 mg twice daily for rate control permanent A-fib and hypertension.  Blood pressure reportedly doing well at home.  Continue current regimen for now with regular monitoring.

## 2024-04-24 NOTE — ASSESSMENT & PLAN NOTE
Status post supraglottic squamous cell carcinoma, status post radiotherapy, followed by Dr. Ray of oncology, next appointment in 5/2024.

## 2024-04-24 NOTE — PROGRESS NOTES
Venipuncture Blood Specimen Collection  Venipuncture performed in left arm by Erin Gutierrez MA with good hemostasis. Patient tolerated the procedure well without complications.   04/24/24   Erin Gutierrez MA

## 2024-04-24 NOTE — ASSESSMENT & PLAN NOTE
Status post L1-L2 laminectomy for intradural extramedullary tumor on 4/11/2024, pathology currently pending, to follow-up with neurosurgery on 4/29/2024.

## 2024-04-24 NOTE — ASSESSMENT & PLAN NOTE
History of longstanding COPD with heavy cigarette consumption, total 55 pack years currently having reduced from 1 pack/day down to 1 to 3 cigarettes daily.  Advised strongly need to stop smoking completely.  Currently asymptomatic on no related respiratory meds.  He does have albuterol to use as needed.

## 2024-04-24 NOTE — ASSESSMENT & PLAN NOTE
Status post previous prostate cancer treated by Dr. Anand Watson, does have some BPH symptoms with urgency frequency and incontinence which has significantly improved since starting Flomax 0.4 mg daily 1 month ago.

## 2024-04-24 NOTE — ASSESSMENT & PLAN NOTE
Permanent A-fib, noted on most recent EKG from 3/2024, not repeated today given lack of acute related concern, previously having been on Eliquis 5 mg twice daily which was discontinued last month given a history of gross hematuria, currently on aspirin 81 mg daily and metoprolol 25 mg at 0.5 tablets or 12.5 mg twice daily.  Continue current regimen of aspirin rather than DOAC given poor risk-benefit ratio taking DOAC at this time.

## 2024-04-24 NOTE — ASSESSMENT & PLAN NOTE
Most recent EKG revealing stable permanent A-fib from last month, not repeated today.  Patient with no related symptoms, history of noncritical CAD per [facility] in 10/2017 with associated permanent A-fib, previously having been on Eliquis switched over to aspirin given gross hematuria and secondary poor risk-benefit ratio, also on metoprolol alone.  Anticipate reinitiation of statin after upcoming lipid profile.  Strongly advised to stop smoking completely.

## 2024-04-24 NOTE — ASSESSMENT & PLAN NOTE
Patient having well-established chronic extensive PVD, status post prior AAA repair, status post amputation of right great toe secondarily.  Pursue risk factor modification anticipating reinitiation of statin after lipid profile obtained, continue aspirin as well as blood pressure control, strongly advised need to completely stop cigarette smoking, noting he has reduced his consumption significantly.

## 2024-04-24 NOTE — ASSESSMENT & PLAN NOTE
Acute on chronic back pain, status post recent L1-L2 laminectomy for intradural extramedullary tumor with pathology unknown.  Had been managed with oxycodone by neurosurgery, having run out several days ago, thus I have agreed to prescribe tramadol 50 mg every 6 hours as needed #12, pending follow-up with neurosurgery.

## 2024-04-24 NOTE — ASSESSMENT & PLAN NOTE
Prior primary lung cancer right sided followed by Dr. Ray, scheduled to follow-up with Dr. Ray next month.

## 2024-04-24 NOTE — ASSESSMENT & PLAN NOTE
Postop anemia status post recent laminectomy with hemoglobin 11.6 in 4/12/2024, and 10.9 on 4/13/2024.  Update testing including iron studies B12 and folic acid.

## 2024-04-24 NOTE — ASSESSMENT & PLAN NOTE
Stage T1c, [place] 6, status post radiation therapy 9/2011 by Dr. Anand Watson of urology, history of normalized PSA.

## 2024-04-24 NOTE — PROGRESS NOTES
The ABCs of the Annual Wellness Visit  Subsequent Medicare Wellness Visit    Subjective    Stephane Noe is a 73 y.o. male who presents for a Subsequent Medicare Wellness Visit.    The following portions of the patient's history were reviewed and   updated as appropriate: allergies, current medications, past family history, past medical history, past social history, past surgical history, and problem list.    Compared to one year ago, the patient feels his physical   health is worse.    Compared to one year ago, the patient feels his mental   health is the same.    Recent Hospitalizations:  He was admitted within the past 365 days at [facility], respiratory failure in 1/2024, laminectomy in 4/2024.       Current Medical Providers:  Patient Care Team:  Nain Morel MD as PCP - General (Internal Medicine)  Hanna Angel MD as Consulting Physician (Internal Medicine)  Keshawn Morejon MD as Referring Physician (Pulmonary Disease)  Viktoriya Kovacs MD as Consulting Physician (Radiation Oncology)    Outpatient Medications Prior to Visit   Medication Sig Dispense Refill    aspirin 81 MG chewable tablet Chew 1 tablet Daily.      metoprolol tartrate (LOPRESSOR) 25 MG tablet Take 0.5 tablets by mouth 2 (Two) Times a Day. 90 tablet 0    tamsulosin (FLOMAX) 0.4 MG capsule 24 hr capsule Take 1 capsule by mouth Daily. 90 capsule 1    albuterol sulfate  (90 Base) MCG/ACT inhaler Inhale 2 puffs Every 4 (Four) Hours As Needed for Wheezing or Shortness of Air.       No facility-administered medications prior to visit.       Opioid medication/s are on active medication list.  and I have evaluated his active treatment plan and pain score trends (see table).  There were no vitals filed for this visit.  I have reviewed the chart for potential of high risk medication and harmful drug interactions in the elderly.          Aspirin is on active medication list. Aspirin use is indicated based on review of current  "medical condition/s. Pros and cons of this therapy have been discussed today. Benefits of this medication outweigh potential harm.  Patient has been encouraged to continue taking this medication.  .      Patient Active Problem List   Diagnosis    Lung mass, right lower lobe vs right hilum, PET avid    Mediastinal adenopathy, PET avidity station 4R, &, 11R/mass    Primary lung cancer, right    Allergic rhinitis due to pollen    Atrial fibrillation    Chronic back pain    Acute respiratory failure with hypoxia    Dyslipidemia    Erectile dysfunction    Malignant neoplasm of oropharynx    Peripheral vascular disease    Prostate cancer    Type 2 diabetes mellitus with diabetic polyneuropathy    Malignant neoplasm of overlapping sites of right lung    Peripheral vascular disease of lower extremity with ulceration    Osteomyelitis of right great toe    Ulcer of great toe    Primary hypertension    Magnesium deficiency    CAD (coronary artery disease)    Influenza vaccination administered at current visit    Cigarette smoker    BPH (benign prostatic hyperplasia)    ETOH abuse    Squamous cell carcinoma of epiglottis    Gross hematuria    Panlobular emphysema    Screening for thyroid disorder    Anemia    Hypotension due to drugs    Lumbar stenosis with neurogenic claudication    Encounter for general adult medical examination with abnormal findings    Intradural extramedullary spinal tumor    History of laminectomy    Vitamin D deficiency     Advance Care Planning   Advance Care Planning     Advance Directive is not on file.  ACP discussion was held with the patient during this visit. Patient does not have an advance directive, information provided.     Objective    Vitals:    04/24/24 1041   BP: 128/84   BP Location: Left arm   Patient Position: Sitting   Cuff Size: Adult   Pulse: 82   Temp: 97.2 °F (36.2 °C)   TempSrc: Temporal   SpO2: 100%   Weight: 78.3 kg (172 lb 9.6 oz)   Height: 182.9 cm (72\")     Estimated body " "mass index is 23.41 kg/m² as calculated from the following:    Height as of this encounter: 182.9 cm (72\").    Weight as of this encounter: 78.3 kg (172 lb 9.6 oz).    BMI is within normal parameters. No other follow-up for BMI required.      Does the patient have evidence of cognitive impairment? No    Lab Results   Component Value Date    CHLPL 151 2024    TRIG 103 2024    HDL 49 2024    LDL 83 2024    VLDL 19 2024    HGBA1C 5.1 2024        HEALTH RISK ASSESSMENT    Smoking Status:  Social History     Tobacco Use   Smoking Status Every Day    Current packs/day: 0.50    Average packs/day: 0.5 packs/day for 55.0 years (27.5 ttl pk-yrs)    Types: Cigarettes    Passive exposure: Current   Smokeless Tobacco Never   Tobacco Comments    4-5 cigarettes a day as of 5/3/23- Pt states that he is only smoking 2-3 cigarettes a day 23     Alcohol Consumption:  Social History     Substance and Sexual Activity   Alcohol Use Yes    Alcohol/week: 7.0 standard drinks of alcohol    Types: 7 Shots of liquor per week    Comment: Yes, 1-2 shots of whiskey daily     Fall Risk Screen:    ALEXADI Fall Risk Assessment was completed, and patient is at LOW risk for falls.Assessment completed on:2024    Depression Screenin/24/2024    10:44 AM   PHQ-2/PHQ-9 Depression Screening   Little Interest or Pleasure in Doing Things 0-->not at all   Feeling Down, Depressed or Hopeless 0-->not at all   PHQ-9: Brief Depression Severity Measure Score 0       Health Habits and Functional and Cognitive Screenin/24/2024    10:44 AM   Functional & Cognitive Status   Do you have difficulty preparing food and eating? No   Do you have difficulty bathing yourself, getting dressed or grooming yourself? No   Do you have difficulty using the toilet? No   Do you have difficulty moving around from place to place? Yes   Do you have trouble with steps or getting out of a bed or a chair? No   Current Diet Well " Balanced Diet   Dental Exam Not up to date   Eye Exam Not up to date   Exercise (times per week) 7 times per week   Current Exercises Include Walking   Do you need help using the phone?  No   Are you deaf or do you have serious difficulty hearing?  No   Do you need help to go to places out of walking distance? Yes   Do you need help shopping? No   Do you need help preparing meals?  No   Do you need help with housework?  No   Do you need help with laundry? No   Do you need help taking your medications? No   Do you need help managing money? No   Do you ever drive or ride in a car without wearing a seat belt? No   Have you felt unusual stress, anger or loneliness in the last month? No   Who do you live with? Child   If you need help, do you have trouble finding someone available to you? No   Have you been bothered in the last four weeks by sexual problems? No   Do you have difficulty concentrating, remembering or making decisions? No       Age-appropriate Screening Schedule:  Refer to the list below for future screening recommendations based on patient's age, sex and/or medical conditions. Orders for these recommended tests are listed in the plan section. The patient has been provided with a written plan.    Health Maintenance   Topic Date Due    DIABETIC EYE EXAM  Never done    TDAP/TD VACCINES (1 - Tdap) Never done    RSV Vaccine - Adults (1 - 1-dose 60+ series) Never done    INFLUENZA VACCINE  08/01/2024    HEMOGLOBIN A1C  10/24/2024    DIABETIC FOOT EXAM  03/25/2025    LIPID PANEL  03/25/2025    URINE MICROALBUMIN  03/25/2025    ANNUAL WELLNESS VISIT  04/24/2025    COLORECTAL CANCER SCREENING  09/15/2026    HEPATITIS C SCREENING  Completed    COVID-19 Vaccine  Completed    Pneumococcal Vaccine 65+  Completed    ZOSTER VACCINE  Completed    LUNG CANCER SCREENING  Discontinued                  CMS Preventative Services Quick Reference  Risk Factors Identified During Encounter  Alcohol Misuse: Patient encouraged to  limit alcohol use to no more than 1 standard alcoholic beverage per day. (12 ounce beer, 6 ounce wine, one shot liquor)  Chronic Pain:  brief script of Tramadol pending Neurosurgery followup  Immunizations Discussed/Encouraged: Tdap and RSV (Respiratory Syncytial Virus)  Tobacco Use/Dependance Risk (use dotphrase .tobaccocessation for documentation)  Vision Screening Recommended  The above risks/problems have been discussed with the patient.  Pertinent information has been shared with the patient in the After Visit Summary.  An After Visit Summary and PPPS were made available to the patient.    Follow Up:   Next Medicare Wellness visit to be scheduled in 1 year.     Additional E&M Note during same encounter follows:  Patient has multiple medical problems which are significant and separately identifiable that require additional work above and beyond the Medicare Wellness Visit.      Chief Complaint  Annual Exam    Subjective        HPI  Stephane Noe is also being seen today for health maintenance review and complete physical.  Patient has an extensive health history including being a survivor of lung cancer, supraglottic cancer, and prostate cancer, having coronary artery disease critical with [facility] in 2017, history of permanent A-fib currently not on DOAC rather aspirin given poor risk-benefit having had gross hematuria, extensive peripheral vascular disease including AAA repair, CT chest abdomen pelvis angiogram revealing mild pulmonary artery dilation of 3.3 cm, thoracic artery upper limit of normal at 4.4 cm, pararenal dominant aortic aneurysm 3.4 cm, and aneurysms of both common iliac and proximal internal iliac arteries, status post amputation of the right great toe, admitted for respiratory failure for greater than 1 month in 1/2024 with a history of longstanding cigarette abuse and COPD, currently denying any cough or dyspnea, not currently on any respiratory meds other than albuterol as needed, most  recently on 4/11/2024 undergoing an L1-L2 laminectomy removing an intradural extramedullary tumor, specific pathology unknown at this time.  He is getting home health since surgery, using a rolling walker but still complaining of significant diffuse back pain more so in the surgical site but also up to his neck good family support living with his daughter.  Had been prescribed oxycodone 5 mg every 4 hours number 24/13/2020 for post laminectomy but has run out of medication.  He is scheduled to follow-up with neurosurgery in 5 days.  He also scheduled to follow-up with Dr. Shawn Ray of oncology on 5/24/2024.  Other than his back pain generally indicates he feels well.  Of note he is known to our taking any of his previous medication other than aspirin, Flomax, and metoprolol, no longer on, smoking currently 1 to 3 cigarettes/day, longstanding history of 1 pack/day smoker x 55 years, drinks 1 or 2 shots of whiskey daily chronically, currently 1 shot daily.  Diabetic with history of neuropathy, hemoglobin A1c 5.6% in 3/2024 normalized to 5.1% today, this given significant weight loss over the last several months from his multiple illnesses.  Currently on diabetic medication.  No longer taking previous Lipitor for hyperlipidemia, doing well regarding his BPH symptoms with Flomax.  History of anemia postoperatively laminectomy, hemoglobin most recently 10.9 on 4/13/2024.  Patient has lost significant weight loss over his recent illnesses, including 13 pound weight loss in the last month, indicating he still eats well, no abdominal discomfort, no change in bowel habits.  Denies any neuropathic symptoms, pain other than back.  Health maintenance includes last being 9/2021 with polyps, no follow-up recommended given age and chronic health problems, need for update of Tdap which declines given lack of Medicare coverage along with RSV vaccine: Update screening labs, update diabetic eye evaluation.         Objective   Vital  "Signs:  /84 (BP Location: Left arm, Patient Position: Sitting, Cuff Size: Adult)   Pulse 82   Temp 97.2 °F (36.2 °C) (Temporal)   Ht 182.9 cm (72\")   Wt 78.3 kg (172 lb 9.6 oz)   SpO2 100%   BMI 23.41 kg/m²     Physical Exam  Vitals and nursing note reviewed.   Constitutional:       General: He is not in acute distress.     Appearance: Normal appearance. He is normal weight. He is ill-appearing. He is not toxic-appearing or diaphoretic.      Comments: Pleasant, chronically but not acutely ill-appearing in no acute distress, alert and oriented, fluent speech, mobilizing well with a rolling walker though complains of back pain especially with changing position   HENT:      Head: Normocephalic and atraumatic.      Right Ear: Tympanic membrane, ear canal and external ear normal.      Left Ear: Tympanic membrane, ear canal and external ear normal.      Nose: No congestion or rhinorrhea.      Comments: Flattening of nasal bridge given remote nasal fracture     Mouth/Throat:      Mouth: Mucous membranes are moist.      Pharynx: Oropharynx is clear. No oropharyngeal exudate or posterior oropharyngeal erythema.      Comments: Upper and partial lower denture plate with residual dentition overall poor condition, no mucosal abnormalities noted  Eyes:      Extraocular Movements: Extraocular movements intact.      Conjunctiva/sclera: Conjunctivae normal.      Pupils: Pupils are equal, round, and reactive to light.   Neck:      Vascular: No carotid bruit.      Comments: Moderate decreased cervical range of motion, no palpable cervical tenderness, no cervical adenopathy or masses, no periclavicular or axillary or inguinal adenopathy  Cardiovascular:      Rate and Rhythm: Normal rate. Rhythm irregular.      Pulses: Normal pulses.      Heart sounds: Normal heart sounds. No murmur heard.     No friction rub. No gallop.      Comments: 2+ carotids without bruits, 1-2+ radial pulses bilaterally, 1-2+ femoral pulses " bilaterally with no auscultated bruits, poorly palpable bipedal pulses with hyperemia of the CV chronically, 2-second capillary perfusion, no skin breakdown and no edema  Pulmonary:      Effort: Pulmonary effort is normal. No respiratory distress.      Breath sounds: No stridor. Rhonchi present. No wheezing or rales.      Comments: Fair airflow with few scattered rhonchi, no focal consolidation, no resting tachypnea dyspnea or cough  Chest:      Chest wall: No tenderness.   Abdominal:      General: Abdomen is flat. Bowel sounds are normal.      Palpations: Abdomen is soft. There is no mass.      Tenderness: There is no abdominal tenderness. There is no guarding or rebound.   Genitourinary:     Comments:  and rectal exams deferred today given lack of acute related concerns  Musculoskeletal:         General: Tenderness and deformity present. No swelling. Normal range of motion.      Cervical back: Neck supple. Rigidity present. No tenderness.      Right lower leg: No edema.      Left lower leg: No edema.      Comments: Status post prior amputation of right great toe, tenderness to palpation of the spine more so around his upper lumbar vertical midline surgical site which is healing well some mild reactive erythema, no evidence of wound dehiscence or infection, scabbing well   Lymphadenopathy:      Cervical: No cervical adenopathy.   Skin:     General: Skin is warm and dry.      Capillary Refill: Capillary refill takes less than 2 seconds.      Findings: Lesion present. No rash.      Comments: Upper midline vertical lumbar laminectomy surgical site healing well with some reactive erythema, no wound dehiscence or signs of infection   Neurological:      General: No focal deficit present.      Mental Status: He is alert and oriented to person, place, and time. Mental status is at baseline.      Cranial Nerves: No cranial nerve deficit.      Sensory: Sensory deficit present.      Motor: Weakness present.       Coordination: Coordination normal.      Gait: Gait normal.      Deep Tendon Reflexes: Reflexes normal.      Comments: Fully alert and oriented with fluent speech, does have some mild generalized weakness of his lower extremities but not focally, 2+ knee jerks and difficult to elicit ankle jerks bilaterally and symmetrically, ambulating without difficulty using a rolling walker, normal sensation in both feet to fine touch vibration and pinprick    Psychiatric:         Mood and Affect: Mood normal.         Behavior: Behavior normal.         Thought Content: Thought content normal.         Judgment: Judgment normal.     Diabetic Foot Exam Performed and Monofilament Test Performed               Assessment and Plan   Diagnoses and all orders for this visit:    1. Encounter for general adult medical examination with abnormal findings (Primary)  -     POC Glycosylated Hemoglobin (Hb A1C)  -     POC Glucose, Blood  -     TSH Rfx On Abnormal To Free T4; Future  -     CBC & Differential; Future  -     Comprehensive Metabolic Panel; Future  -     Lipid Panel; Future  -     Vitamin D,25-Hydroxy; Future  -     Vitamin B12; Future  -     Folate; Future  -     Ferritin; Future  -     Iron Profile; Future  -     Urinalysis With Culture If Indicated -; Future  -     MicroAlbumin, Urine, Random - Urine, Clean Catch; Future  -     Iron Profile  -     Ferritin  -     Folate  -     Vitamin B12  -     Vitamin D,25-Hydroxy  -     Lipid Panel  -     TSH Rfx On Abnormal To Free T4  -     Comprehensive Metabolic Panel  -     CBC & Differential    2. Coronary artery disease involving native heart without angina pectoris, unspecified vessel or lesion type  Assessment & Plan:  Most recent EKG revealing stable permanent A-fib from last month, not repeated today.  Patient with no related symptoms, history of noncritical CAD per [facility] in 10/2017 with associated permanent A-fib, previously having been on Eliquis switched over to aspirin given gross  hematuria and secondary poor risk-benefit ratio, also on metoprolol alone.  Anticipate reinitiation of statin after upcoming lipid profile.  Strongly advised to stop smoking completely.      3. Permanent atrial fibrillation  Assessment & Plan:  Permanent A-fib, noted on most recent EKG from 3/2024, not repeated today given lack of acute related concern, previously having been on Eliquis 5 mg twice daily which was discontinued last month given a history of gross hematuria, currently on aspirin 81 mg daily and metoprolol 25 mg at 0.5 tablets or 12.5 mg twice daily.  Continue current regimen of aspirin rather than DOAC given poor risk-benefit ratio taking DOAC at this time.      4. Peripheral vascular disease  Assessment & Plan:  Patient having well-established chronic extensive PVD, status post prior AAA repair, status post amputation of right great toe secondarily.  Pursue risk factor modification anticipating reinitiation of statin after lipid profile obtained, continue aspirin as well as blood pressure control, strongly advised need to completely stop cigarette smoking, noting he has reduced his consumption significantly.      5. Type 2 diabetes mellitus with diabetic polyneuropathy, without long-term current use of insulin  Assessment & Plan:  Previously treated with metformin, hemoglobin A1c 5.6% 1 month prior, now dropped and 5.1% today on no diabetic meds, daily secondary to ongoing weight loss.  Has had history of mild microalbuminuria but given historic low blood pressures has not currently been treated with ACE inhibitor or ARB.  Will update testing and make further recommendation accordingly.  Also advised strongly to update diabetic eye evaluation.    Orders:  -     POC Glycosylated Hemoglobin (Hb A1C)  -     POC Glucose, Blood  -     MicroAlbumin, Urine, Random - Urine, Clean Catch; Future    6. Primary hypertension  Assessment & Plan:  Currently being managed only on metoprolol 25 mg at 0.5 tablets of  12.5 mg twice daily for rate control permanent A-fib and hypertension.  Blood pressure reportedly doing well at home.  Continue current regimen for now with regular monitoring.    Orders:  -     Comprehensive Metabolic Panel; Future  -     Urinalysis With Culture If Indicated -; Future  -     Comprehensive Metabolic Panel    7. Dyslipidemia  -     Lipid Panel; Future  -     Lipid Panel    8. Vitamin D deficiency  -     Vitamin D,25-Hydroxy; Future  -     Vitamin D,25-Hydroxy    9. Benign prostatic hyperplasia with weak urinary stream  Assessment & Plan:  Status post previous prostate cancer treated by Dr. Anand Watson, does have some BPH symptoms with urgency frequency and incontinence which has significantly improved since starting Flomax 0.4 mg daily 1 month ago.      10. Screening for thyroid disorder  -     TSH Rfx On Abnormal To Free T4; Future  -     TSH Rfx On Abnormal To Free T4    11. Primary lung cancer, right  Assessment & Plan:  Prior primary lung cancer right sided followed by Dr. Ray, scheduled to follow-up with Dr. Ray next month.      12. Malignant neoplasm of oropharynx  Assessment & Plan:  Status post supraglottic squamous cell carcinoma, status post radiotherapy, followed by Dr. Ray of oncology, next appointment in 5/2024.      13. Intradural extramedullary spinal tumor  Assessment & Plan:  Status post L1-L2 laminectomy for intradural extramedullary tumor on 4/11/2024, pathology currently pending, to follow-up with neurosurgery on 4/29/2024.      14. History of laminectomy  Assessment & Plan:  Status post L1-L2 laminectomy on 4/11/2024 at [facility] given note of intradural extramedullary tumor, pathology currently pending.  To follow-up with neurosurgery on 4/29/2024.    Orders:  -     traMADol (ULTRAM) 50 MG tablet; Take 1 tablet by mouth Every 6 (Six) Hours As Needed for Moderate Pain.  Dispense: 12 tablet; Refill: 0    15. Chronic midline low back pain without  sciatica  Assessment & Plan:  Acute on chronic back pain, status post recent L1-L2 laminectomy for intradural extramedullary tumor with pathology unknown.  Had been managed with oxycodone by neurosurgery, having run out several days ago, thus I have agreed to prescribe tramadol 50 mg every 6 hours as needed #12, pending follow-up with neurosurgery.    Orders:  -     traMADol (ULTRAM) 50 MG tablet; Take 1 tablet by mouth Every 6 (Six) Hours As Needed for Moderate Pain.  Dispense: 12 tablet; Refill: 0    16. Prostate cancer  Assessment & Plan:  Stage T1c, [place] 6, status post radiation therapy 9/2011 by Dr. Anand Watson of urology, history of normalized PSA.      17. Anemia, unspecified type  Assessment & Plan:  Postop anemia status post recent laminectomy with hemoglobin 11.6 in 4/12/2024, and 10.9 on 4/13/2024.  Update testing including iron studies B12 and folic acid.    Orders:  -     CBC & Differential; Future  -     Vitamin B12; Future  -     Folate; Future  -     Ferritin; Future  -     Iron Profile; Future  -     Iron Profile  -     Ferritin  -     Folate  -     Vitamin B12  -     CBC & Differential    18. Panlobular emphysema  Assessment & Plan:  History of longstanding COPD with heavy cigarette consumption, total 55 pack years currently having reduced from 1 pack/day down to 1 to 3 cigarettes daily.  Advised strongly need to stop smoking completely.  Currently asymptomatic on no related respiratory meds.  He does have albuterol to use as needed.          19. Cigarette smoker  Assessment & Plan:  Historically 1 pack/day smoker x 55 years, reduced to 1-3 cigarettes daily as of 4/2024.               Follow Up   Return in about 3 months (around 7/24/2024) for Recheck, Labs today.  Patient was given instructions and counseling regarding his condition or for health maintenance advice. Please see specific information pulled into the AVS if appropriate.

## 2024-04-24 NOTE — ASSESSMENT & PLAN NOTE
Previously treated with metformin, hemoglobin A1c 5.6% 1 month prior, now dropped and 5.1% today on no diabetic meds, daily secondary to ongoing weight loss.  Has had history of mild microalbuminuria but given historic low blood pressures has not currently been treated with ACE inhibitor or ARB.  Will update testing and make further recommendation accordingly.  Also advised strongly to update diabetic eye evaluation.

## 2024-04-25 DIAGNOSIS — E55.9 VITAMIN D DEFICIENCY: Primary | ICD-10-CM

## 2024-04-25 LAB
25(OH)D3+25(OH)D2 SERPL-MCNC: 22.9 NG/ML (ref 30–100)
ALBUMIN SERPL-MCNC: 3.3 G/DL (ref 3.8–4.8)
ALBUMIN/GLOB SERPL: 1.3 {RATIO} (ref 1.2–2.2)
ALP SERPL-CCNC: 89 IU/L (ref 44–121)
ALT SERPL-CCNC: 5 IU/L (ref 0–44)
AST SERPL-CCNC: 12 IU/L (ref 0–40)
BASOPHILS # BLD AUTO: 0.1 X10E3/UL (ref 0–0.2)
BASOPHILS NFR BLD AUTO: 1 %
BILIRUB SERPL-MCNC: 0.4 MG/DL (ref 0–1.2)
BUN SERPL-MCNC: 12 MG/DL (ref 8–27)
BUN/CREAT SERPL: 15 (ref 10–24)
CALCIUM SERPL-MCNC: 8.5 MG/DL (ref 8.6–10.2)
CHLORIDE SERPL-SCNC: 103 MMOL/L (ref 96–106)
CHOLEST SERPL-MCNC: 145 MG/DL (ref 100–199)
CO2 SERPL-SCNC: 25 MMOL/L (ref 20–29)
CREAT SERPL-MCNC: 0.8 MG/DL (ref 0.76–1.27)
EGFRCR SERPLBLD CKD-EPI 2021: 93 ML/MIN/1.73
EOSINOPHIL # BLD AUTO: 0.2 X10E3/UL (ref 0–0.4)
EOSINOPHIL NFR BLD AUTO: 2 %
ERYTHROCYTE [DISTWIDTH] IN BLOOD BY AUTOMATED COUNT: 15.5 % (ref 11.6–15.4)
FERRITIN SERPL-MCNC: 225 NG/ML (ref 30–400)
FOLATE SERPL-MCNC: 13.9 NG/ML
GLOBULIN SER CALC-MCNC: 2.6 G/DL (ref 1.5–4.5)
GLUCOSE SERPL-MCNC: 107 MG/DL (ref 70–99)
HCT VFR BLD AUTO: 39.8 % (ref 37.5–51)
HDLC SERPL-MCNC: 36 MG/DL
HGB BLD-MCNC: 13 G/DL (ref 13–17.7)
IMM GRANULOCYTES # BLD AUTO: 0.1 X10E3/UL (ref 0–0.1)
IMM GRANULOCYTES NFR BLD AUTO: 1 %
IRON SATN MFR SERPL: 19 % (ref 15–55)
IRON SERPL-MCNC: 34 UG/DL (ref 38–169)
LDLC SERPL CALC-MCNC: 88 MG/DL (ref 0–99)
LYMPHOCYTES # BLD AUTO: 0.7 X10E3/UL (ref 0.7–3.1)
LYMPHOCYTES NFR BLD AUTO: 6 %
MCH RBC QN AUTO: 33.5 PG (ref 26.6–33)
MCHC RBC AUTO-ENTMCNC: 32.7 G/DL (ref 31.5–35.7)
MCV RBC AUTO: 103 FL (ref 79–97)
MONOCYTES # BLD AUTO: 0.4 X10E3/UL (ref 0.1–0.9)
MONOCYTES NFR BLD AUTO: 4 %
NEUTROPHILS # BLD AUTO: 10.3 X10E3/UL (ref 1.4–7)
NEUTROPHILS NFR BLD AUTO: 86 %
PLATELET # BLD AUTO: 291 X10E3/UL (ref 150–450)
POTASSIUM SERPL-SCNC: 3.8 MMOL/L (ref 3.5–5.2)
PROT SERPL-MCNC: 5.9 G/DL (ref 6–8.5)
RBC # BLD AUTO: 3.88 X10E6/UL (ref 4.14–5.8)
SODIUM SERPL-SCNC: 143 MMOL/L (ref 134–144)
TIBC SERPL-MCNC: 179 UG/DL (ref 250–450)
TRIGL SERPL-MCNC: 113 MG/DL (ref 0–149)
TSH SERPL DL<=0.005 MIU/L-ACNC: 3.9 UIU/ML (ref 0.45–4.5)
UIBC SERPL-MCNC: 145 UG/DL (ref 111–343)
VIT B12 SERPL-MCNC: 1394 PG/ML (ref 232–1245)
VLDLC SERPL CALC-MCNC: 21 MG/DL (ref 5–40)
WBC # BLD AUTO: 11.7 X10E3/UL (ref 3.4–10.8)

## 2024-04-25 RX ORDER — CHOLECALCIFEROL (VITAMIN D3) 50 MCG
2000 TABLET ORAL DAILY
Qty: 90 TABLET | Refills: 3 | Status: SHIPPED | OUTPATIENT
Start: 2024-04-25

## 2024-04-26 ENCOUNTER — TELEPHONE (OUTPATIENT)
Dept: FAMILY MEDICINE CLINIC | Facility: CLINIC | Age: 74
End: 2024-04-26
Payer: MEDICARE

## 2024-04-26 NOTE — TELEPHONE ENCOUNTER
----- Message from Ada PETERS sent at 4/25/2024  6:17 PM EDT -----  Please advise patient that his recent laboratory testing indicated vitamin D insufficiency for which I will call him and vitamin D 2000 IU once daily, testing otherwise satisfactory including his iron studies, B12, folic acid, cholesterol, chemistry profile, CBC including blood counts, and thyroid testing.    I have spoke with him regarding his lab results. He states he understands about the vitamin D. TF

## 2024-05-08 DIAGNOSIS — I71.43 INFRARENAL ABDOMINAL AORTIC ANEURYSM (AAA) WITHOUT RUPTURE: ICD-10-CM

## 2024-05-08 DIAGNOSIS — I72.3 ANEURYSM OF COMMON ILIAC ARTERY: ICD-10-CM

## 2024-05-15 ENCOUNTER — TELEPHONE (OUTPATIENT)
Dept: FAMILY MEDICINE CLINIC | Facility: CLINIC | Age: 74
End: 2024-05-15
Payer: MEDICARE

## 2024-05-15 NOTE — TELEPHONE ENCOUNTER
I have spoke with him regarding his scan. He states that he understand what I have let him know. TF

## 2024-06-11 ENCOUNTER — APPOINTMENT (OUTPATIENT)
Dept: GENERAL RADIOLOGY | Facility: HOSPITAL | Age: 74
End: 2024-06-11
Payer: MEDICARE

## 2024-06-11 ENCOUNTER — HOSPITAL ENCOUNTER (EMERGENCY)
Facility: HOSPITAL | Age: 74
Discharge: ANOTHER HOSPITAL | End: 2024-06-11
Attending: EMERGENCY MEDICINE | Admitting: EMERGENCY MEDICINE
Payer: MEDICARE

## 2024-06-11 ENCOUNTER — APPOINTMENT (OUTPATIENT)
Dept: CT IMAGING | Facility: HOSPITAL | Age: 74
End: 2024-06-11
Payer: MEDICARE

## 2024-06-11 VITALS
HEIGHT: 72 IN | DIASTOLIC BLOOD PRESSURE: 69 MMHG | OXYGEN SATURATION: 100 % | RESPIRATION RATE: 18 BRPM | HEART RATE: 105 BPM | WEIGHT: 171.96 LBS | SYSTOLIC BLOOD PRESSURE: 112 MMHG | BODY MASS INDEX: 23.29 KG/M2 | TEMPERATURE: 97.6 F

## 2024-06-11 DIAGNOSIS — R65.10 SIRS (SYSTEMIC INFLAMMATORY RESPONSE SYNDROME): ICD-10-CM

## 2024-06-11 DIAGNOSIS — I71.42 JUXTARENAL ABDOMINAL AORTIC ANEURYSM (AAA) WITHOUT RUPTURE: ICD-10-CM

## 2024-06-11 DIAGNOSIS — N13.1 HYDRONEPHROSIS WITH URETERAL STRICTURE, NOT ELSEWHERE CLASSIFIED: Primary | ICD-10-CM

## 2024-06-11 DIAGNOSIS — E87.5 HYPERKALEMIA: ICD-10-CM

## 2024-06-11 DIAGNOSIS — N04.9 ANASARCA ASSOCIATED WITH DISORDER OF KIDNEY: ICD-10-CM

## 2024-06-11 DIAGNOSIS — N17.9 AKI (ACUTE KIDNEY INJURY): ICD-10-CM

## 2024-06-11 LAB
ALBUMIN SERPL-MCNC: 2.5 G/DL (ref 3.5–5.2)
ALBUMIN/GLOB SERPL: 0.8 G/DL
ALP SERPL-CCNC: 111 U/L (ref 39–117)
ALT SERPL W P-5'-P-CCNC: 5 U/L (ref 1–41)
ANION GAP SERPL CALCULATED.3IONS-SCNC: 13.2 MMOL/L (ref 5–15)
ANION GAP SERPL CALCULATED.3IONS-SCNC: 16.4 MMOL/L (ref 5–15)
APTT PPP: 31.3 SECONDS (ref 23–35)
AST SERPL-CCNC: 9 U/L (ref 1–40)
ATMOSPHERIC PRESS: 733 MMHG
BACTERIA UR QL AUTO: ABNORMAL /HPF
BASE EXCESS BLDV CALC-SCNC: -10.6 MMOL/L (ref 0–2)
BASOPHILS # BLD AUTO: 0.02 10*3/MM3 (ref 0–0.2)
BASOPHILS NFR BLD AUTO: 0.1 % (ref 0–1.5)
BDY SITE: ABNORMAL
BILIRUB SERPL-MCNC: 0.3 MG/DL (ref 0–1.2)
BILIRUB UR QL STRIP: ABNORMAL
BUN SERPL-MCNC: 74 MG/DL (ref 8–23)
BUN SERPL-MCNC: 76 MG/DL (ref 8–23)
BUN/CREAT SERPL: 8.3 (ref 7–25)
BUN/CREAT SERPL: 8.4 (ref 7–25)
CALCIUM SPEC-SCNC: 8.2 MG/DL (ref 8.6–10.5)
CALCIUM SPEC-SCNC: 8.6 MG/DL (ref 8.6–10.5)
CHLORIDE SERPL-SCNC: 106 MMOL/L (ref 98–107)
CHLORIDE SERPL-SCNC: 109 MMOL/L (ref 98–107)
CLARITY UR: ABNORMAL
CO2 SERPL-SCNC: 12.6 MMOL/L (ref 22–29)
CO2 SERPL-SCNC: 15.8 MMOL/L (ref 22–29)
COHGB MFR BLD: 2.5 % (ref 0–5)
COLOR UR: ABNORMAL
CREAT SERPL-MCNC: 8.87 MG/DL (ref 0.76–1.27)
CREAT SERPL-MCNC: 9.1 MG/DL (ref 0.76–1.27)
D-LACTATE SERPL-SCNC: 1.3 MMOL/L (ref 0.5–2)
DEPRECATED RDW RBC AUTO: 66 FL (ref 37–54)
EGFRCR SERPLBLD CKD-EPI 2021: 5.6 ML/MIN/1.73
EGFRCR SERPLBLD CKD-EPI 2021: 5.8 ML/MIN/1.73
EOSINOPHIL # BLD AUTO: 0.01 10*3/MM3 (ref 0–0.4)
EOSINOPHIL NFR BLD AUTO: 0.1 % (ref 0.3–6.2)
ERYTHROCYTE [DISTWIDTH] IN BLOOD BY AUTOMATED COUNT: 17.9 % (ref 12.3–15.4)
GEN 5 2HR TROPONIN T REFLEX: 25 NG/L
GLOBULIN UR ELPH-MCNC: 3 GM/DL
GLUCOSE BLDC GLUCOMTR-MCNC: 136 MG/DL (ref 70–130)
GLUCOSE BLDC GLUCOMTR-MCNC: 81 MG/DL (ref 70–130)
GLUCOSE SERPL-MCNC: 77 MG/DL (ref 65–99)
GLUCOSE SERPL-MCNC: 81 MG/DL (ref 65–99)
GLUCOSE UR STRIP-MCNC: NEGATIVE MG/DL
HCO3 BLDV-SCNC: 16.5 MMOL/L (ref 22–28)
HCT VFR BLD AUTO: 34.3 % (ref 37.5–51)
HGB BLD-MCNC: 11.6 G/DL (ref 13–17.7)
HGB UR QL STRIP.AUTO: ABNORMAL
HOLD SPECIMEN: NORMAL
HOLD SPECIMEN: NORMAL
HYALINE CASTS UR QL AUTO: ABNORMAL /LPF
IMM GRANULOCYTES # BLD AUTO: 0.12 10*3/MM3 (ref 0–0.05)
IMM GRANULOCYTES NFR BLD AUTO: 0.8 % (ref 0–0.5)
INR PPP: 0.98 (ref 0.9–1.1)
KETONES UR QL STRIP: ABNORMAL
LEUKOCYTE ESTERASE UR QL STRIP.AUTO: ABNORMAL
LIPASE SERPL-CCNC: 9 U/L (ref 13–60)
LYMPHOCYTES # BLD AUTO: 0.43 10*3/MM3 (ref 0.7–3.1)
LYMPHOCYTES NFR BLD AUTO: 3 % (ref 19.6–45.3)
Lab: ABNORMAL
Lab: ABNORMAL
MCH RBC QN AUTO: 34.2 PG (ref 26.6–33)
MCHC RBC AUTO-ENTMCNC: 33.8 G/DL (ref 31.5–35.7)
MCV RBC AUTO: 101.2 FL (ref 79–97)
METHGB BLD QL: 0.5 % (ref 0–3)
MODALITY: ABNORMAL
MONOCYTES # BLD AUTO: 0.64 10*3/MM3 (ref 0.1–0.9)
MONOCYTES NFR BLD AUTO: 4.4 % (ref 5–12)
NEUTROPHILS NFR BLD AUTO: 13.18 10*3/MM3 (ref 1.7–7)
NEUTROPHILS NFR BLD AUTO: 91.6 % (ref 42.7–76)
NITRITE UR QL STRIP: POSITIVE
NOTIFIED BY: ABNORMAL
NOTIFIED WHO: ABNORMAL
NRBC BLD AUTO-RTO: 0 /100 WBC (ref 0–0.2)
OXYHGB MFR BLDV: 14.8 % (ref 40–70)
PCO2 BLDV: 40.5 MM HG (ref 40–50)
PH BLDV: 7.22 PH UNITS (ref 7.32–7.42)
PH UR STRIP.AUTO: 7.5 [PH] (ref 5–8)
PLATELET # BLD AUTO: 227 10*3/MM3 (ref 140–450)
PMV BLD AUTO: 10.5 FL (ref 6–12)
PO2 BLDV: <15.4 MM HG (ref 30–50)
POTASSIUM SERPL-SCNC: 6.7 MMOL/L (ref 3.5–5.2)
POTASSIUM SERPL-SCNC: 6.7 MMOL/L (ref 3.5–5.2)
PROT SERPL-MCNC: 5.5 G/DL (ref 6–8.5)
PROT UR QL STRIP: ABNORMAL
PROTHROMBIN TIME: 13.5 SECONDS (ref 12.3–15.1)
RBC # BLD AUTO: 3.39 10*6/MM3 (ref 4.14–5.8)
RBC # UR STRIP: ABNORMAL /HPF
REF LAB TEST METHOD: ABNORMAL
SAO2 % BLDCOV: 15.3 % (ref 45–75)
SODIUM SERPL-SCNC: 135 MMOL/L (ref 136–145)
SODIUM SERPL-SCNC: 138 MMOL/L (ref 136–145)
SP GR UR STRIP: 1.02 (ref 1–1.03)
SQUAMOUS #/AREA URNS HPF: ABNORMAL /HPF
TROPONIN T DELTA: -1 NG/L
TROPONIN T SERPL HS-MCNC: 26 NG/L
UROBILINOGEN UR QL STRIP: ABNORMAL
VENTILATOR MODE: ABNORMAL
WBC # UR STRIP: ABNORMAL /HPF
WBC NRBC COR # BLD AUTO: 14.4 10*3/MM3 (ref 3.4–10.8)
WHOLE BLOOD HOLD COAG: NORMAL
WHOLE BLOOD HOLD SPECIMEN: NORMAL

## 2024-06-11 PROCEDURE — 99291 CRITICAL CARE FIRST HOUR: CPT

## 2024-06-11 PROCEDURE — 96367 TX/PROPH/DG ADDL SEQ IV INF: CPT

## 2024-06-11 PROCEDURE — 81001 URINALYSIS AUTO W/SCOPE: CPT | Performed by: EMERGENCY MEDICINE

## 2024-06-11 PROCEDURE — 25010000002 HYDROMORPHONE PER 4 MG: Performed by: EMERGENCY MEDICINE

## 2024-06-11 PROCEDURE — 82805 BLOOD GASES W/O2 SATURATION: CPT

## 2024-06-11 PROCEDURE — 96365 THER/PROPH/DIAG IV INF INIT: CPT

## 2024-06-11 PROCEDURE — 99285 EMERGENCY DEPT VISIT HI MDM: CPT

## 2024-06-11 PROCEDURE — 71045 X-RAY EXAM CHEST 1 VIEW: CPT

## 2024-06-11 PROCEDURE — 36415 COLL VENOUS BLD VENIPUNCTURE: CPT

## 2024-06-11 PROCEDURE — 84484 ASSAY OF TROPONIN QUANT: CPT | Performed by: EMERGENCY MEDICINE

## 2024-06-11 PROCEDURE — 25810000003 LACTATED RINGERS SOLUTION: Performed by: EMERGENCY MEDICINE

## 2024-06-11 PROCEDURE — 87154 CUL TYP ID BLD PTHGN 6+ TRGT: CPT | Performed by: EMERGENCY MEDICINE

## 2024-06-11 PROCEDURE — 96361 HYDRATE IV INFUSION ADD-ON: CPT

## 2024-06-11 PROCEDURE — 51702 INSERT TEMP BLADDER CATH: CPT

## 2024-06-11 PROCEDURE — 80053 COMPREHEN METABOLIC PANEL: CPT | Performed by: EMERGENCY MEDICINE

## 2024-06-11 PROCEDURE — 25010000002 CALCIUM GLUCONATE-NACL 1-0.675 GM/50ML-% SOLUTION: Performed by: EMERGENCY MEDICINE

## 2024-06-11 PROCEDURE — 63710000001 INSULIN REGULAR HUMAN PER 5 UNITS: Performed by: EMERGENCY MEDICINE

## 2024-06-11 PROCEDURE — 25510000001 IOPAMIDOL 61 % SOLUTION: Performed by: EMERGENCY MEDICINE

## 2024-06-11 PROCEDURE — 85025 COMPLETE CBC W/AUTO DIFF WBC: CPT | Performed by: EMERGENCY MEDICINE

## 2024-06-11 PROCEDURE — 82820 HEMOGLOBIN-OXYGEN AFFINITY: CPT

## 2024-06-11 PROCEDURE — 82948 REAGENT STRIP/BLOOD GLUCOSE: CPT | Performed by: EMERGENCY MEDICINE

## 2024-06-11 PROCEDURE — 93005 ELECTROCARDIOGRAM TRACING: CPT | Performed by: EMERGENCY MEDICINE

## 2024-06-11 PROCEDURE — 85610 PROTHROMBIN TIME: CPT | Performed by: EMERGENCY MEDICINE

## 2024-06-11 PROCEDURE — 51798 US URINE CAPACITY MEASURE: CPT

## 2024-06-11 PROCEDURE — 25010000002 CEFEPIME PER 500 MG: Performed by: EMERGENCY MEDICINE

## 2024-06-11 PROCEDURE — 82948 REAGENT STRIP/BLOOD GLUCOSE: CPT

## 2024-06-11 PROCEDURE — 71275 CT ANGIOGRAPHY CHEST: CPT

## 2024-06-11 PROCEDURE — 85730 THROMBOPLASTIN TIME PARTIAL: CPT | Performed by: EMERGENCY MEDICINE

## 2024-06-11 PROCEDURE — 83690 ASSAY OF LIPASE: CPT | Performed by: EMERGENCY MEDICINE

## 2024-06-11 PROCEDURE — 96375 TX/PRO/DX INJ NEW DRUG ADDON: CPT

## 2024-06-11 PROCEDURE — 74175 CTA ABDOMEN W/CONTRAST: CPT

## 2024-06-11 PROCEDURE — 25810000003 SODIUM CHLORIDE 0.9 % SOLUTION: Performed by: EMERGENCY MEDICINE

## 2024-06-11 PROCEDURE — 83605 ASSAY OF LACTIC ACID: CPT | Performed by: EMERGENCY MEDICINE

## 2024-06-11 PROCEDURE — 25010000002 VANCOMYCIN 5 G RECONSTITUTED SOLUTION: Performed by: EMERGENCY MEDICINE

## 2024-06-11 PROCEDURE — 87040 BLOOD CULTURE FOR BACTERIA: CPT | Performed by: EMERGENCY MEDICINE

## 2024-06-11 RX ORDER — SODIUM CHLORIDE 0.9 % (FLUSH) 0.9 %
10 SYRINGE (ML) INJECTION AS NEEDED
Status: DISCONTINUED | OUTPATIENT
Start: 2024-06-11 | End: 2024-06-11 | Stop reason: HOSPADM

## 2024-06-11 RX ORDER — DEXTROSE MONOHYDRATE 25 G/50ML
25 INJECTION, SOLUTION INTRAVENOUS ONCE
Status: COMPLETED | OUTPATIENT
Start: 2024-06-11 | End: 2024-06-11

## 2024-06-11 RX ORDER — CALCIUM GLUCONATE 20 MG/ML
1 INJECTION, SOLUTION INTRAVENOUS ONCE
Status: COMPLETED | OUTPATIENT
Start: 2024-06-11 | End: 2024-06-11

## 2024-06-11 RX ORDER — VANCOMYCIN/0.9 % SOD CHLORIDE 1.5G/250ML
20 PLASTIC BAG, INJECTION (ML) INTRAVENOUS ONCE
Status: DISCONTINUED | OUTPATIENT
Start: 2024-06-11 | End: 2024-06-11

## 2024-06-11 RX ORDER — SODIUM CHLORIDE 0.9 % (FLUSH) 0.9 %
10 SYRINGE (ML) INJECTION AS NEEDED
Status: CANCELLED | OUTPATIENT
Start: 2024-06-11

## 2024-06-11 RX ORDER — HYDROMORPHONE HYDROCHLORIDE 2 MG/ML
0.5 INJECTION, SOLUTION INTRAMUSCULAR; INTRAVENOUS; SUBCUTANEOUS ONCE
Status: COMPLETED | OUTPATIENT
Start: 2024-06-11 | End: 2024-06-11

## 2024-06-11 RX ADMIN — CEFEPIME 2000 MG: 2 INJECTION, POWDER, FOR SOLUTION INTRAVENOUS at 15:26

## 2024-06-11 RX ADMIN — VANCOMYCIN HYDROCHLORIDE 1250 MG: 5 INJECTION, POWDER, LYOPHILIZED, FOR SOLUTION INTRAVENOUS at 16:48

## 2024-06-11 RX ADMIN — INSULIN HUMAN 5 UNITS: 100 INJECTION, SOLUTION PARENTERAL at 15:25

## 2024-06-11 RX ADMIN — METOPROLOL TARTRATE 25 MG: 25 TABLET, FILM COATED ORAL at 14:12

## 2024-06-11 RX ADMIN — DEXTROSE MONOHYDRATE 25 G: 25 INJECTION, SOLUTION INTRAVENOUS at 15:25

## 2024-06-11 RX ADMIN — SODIUM ZIRCONIUM CYCLOSILICATE 10 G: 10 POWDER, FOR SUSPENSION ORAL at 15:25

## 2024-06-11 RX ADMIN — CALCIUM GLUCONATE 1 G: 20 INJECTION, SOLUTION INTRAVENOUS at 15:25

## 2024-06-11 RX ADMIN — IOPAMIDOL 100 ML: 612 INJECTION, SOLUTION INTRAVENOUS at 15:50

## 2024-06-11 RX ADMIN — HYDROMORPHONE HYDROCHLORIDE 0.5 MG: 2 INJECTION, SOLUTION INTRAMUSCULAR; INTRAVENOUS; SUBCUTANEOUS at 18:38

## 2024-06-11 RX ADMIN — SODIUM CHLORIDE, POTASSIUM CHLORIDE, SODIUM LACTATE AND CALCIUM CHLORIDE 1000 ML: 600; 310; 30; 20 INJECTION, SOLUTION INTRAVENOUS at 14:12

## 2024-06-11 NOTE — ED PROVIDER NOTES
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   dextrose (D50W) (25 g/50 mL) IV injection 25 g (25 g Intravenous Given 6/11/24 1525)   sodium zirconium cyclosilicate (LOKELMA) packet 10 g (10 g Oral Given 6/11/24 1525)   iopamidol (ISOVUE-300) 61 % injection 100 mL (100 mL Intravenous Given 6/11/24 1550)       CONSULTS:  None    CRITICAL CARE:  There was significant risk of life threatening deterioration of patient's condition requiring my direct management. Critical care time 75 minutes, excluding any documented procedures.    FINAL IMPRESSION      1. Hydronephrosis with ureteral stricture, not elsewhere classified    2. Anasarca associated with disorder of kidney    3. Hyperkalemia    4. LAY (acute kidney injury)    5. SIRS (systemic inflammatory response syndrome)    6. Juxtarenal abdominal aortic aneurysm (AAA) without rupture          DISPOSITION / PLAN     ED Disposition       ED Disposition   Intended Admit    Condition   --    Comment   --               PATIENT REFERRED TO:  No follow-up provider specified.    DISCHARGE MEDICATIONS:     Medication List        ASK your doctor about these medications      albuterol sulfate  (90 Base) MCG/ACT inhaler  Commonly known as: " PROVENTIL HFA;VENTOLIN HFA;PROAIR HFA     aspirin 81 MG chewable tablet     metoprolol tartrate 25 MG tablet  Commonly known as: LOPRESSOR  TAKE 1/2 TABLET TWICE DAILY     tamsulosin 0.4 MG capsule 24 hr capsule  Commonly known as: FLOMAX  Take 1 capsule by mouth Daily.     traMADol 50 MG tablet  Commonly known as: ULTRAM  Take 1 tablet by mouth Every 6 (Six) Hours As Needed for Moderate Pain.     Vitamin D 50 MCG (2000 UT) tablet  Take 1 tablet by mouth Daily.              Electronically signed by Olaf Araujo DO, 06/11/24, 1:20 PM EDT.    Emergency Medicine Physician  [facility]  (Please note that portions of thisnote were completed with a voice recognition program.  Efforts were made to edit the dictations but occasionally words are mis-transcribed.)           Olaf Araujo DO  06/11/24 1838

## 2024-06-11 NOTE — ED NOTES
Called [facility] transfer center att requesting transfer consult per DO Gayle request. Call transferred.

## 2024-06-11 NOTE — ED NOTES
[facility] transfer center called att w/ Dr. Cross, Urologist for DO Gayle. Call transferred.

## 2024-06-12 LAB
BACTERIA BLD CULT: ABNORMAL
BOTTLE TYPE: ABNORMAL

## 2024-06-14 LAB
BACTERIA SPEC AEROBE CULT: ABNORMAL
GRAM STN SPEC: ABNORMAL
ISOLATED FROM: ABNORMAL

## 2024-06-16 LAB — BACTERIA SPEC AEROBE CULT: NORMAL

## 2024-06-22 ENCOUNTER — READMISSION MANAGEMENT (OUTPATIENT)
Dept: CALL CENTER | Facility: HOSPITAL | Age: 74
End: 2024-06-22
Payer: MEDICARE

## 2024-06-22 NOTE — OUTREACH NOTE
Prep Survey      Flowsheet Row Responses   [facility] patient discharged from? Non-BH   Is LACE score < 7 ? Non-BH Discharge   Eligibility Not Eligible   What are the reasons patient is not eligible? [facility]  [Skilled Nursing Facility]   Does the patient have one of the following disease processes/diagnoses(primary or secondary)? Other   Prep survey completed? Yes            Gwen SHEEHAN - Registered Nurse

## 2024-07-01 ENCOUNTER — DOCUMENTATION (OUTPATIENT)
Dept: FAMILY MEDICINE CLINIC | Facility: CLINIC | Age: 74
End: 2024-07-01
Payer: MEDICARE

## 2024-07-01 ENCOUNTER — NURSING HOME (OUTPATIENT)
Dept: FAMILY MEDICINE CLINIC | Facility: CLINIC | Age: 74
End: 2024-07-01
Payer: MEDICARE

## 2024-07-01 VITALS
DIASTOLIC BLOOD PRESSURE: 76 MMHG | SYSTOLIC BLOOD PRESSURE: 130 MMHG | OXYGEN SATURATION: 95 % | HEART RATE: 78 BPM | RESPIRATION RATE: 18 BRPM | TEMPERATURE: 97.8 F

## 2024-07-01 DIAGNOSIS — Z85.118 HISTORY OF LUNG CANCER: ICD-10-CM

## 2024-07-01 DIAGNOSIS — Z85.819 HISTORY OF OROPHARYNGEAL CANCER: ICD-10-CM

## 2024-07-01 DIAGNOSIS — Z85.46 HISTORY OF PROSTATE CANCER: ICD-10-CM

## 2024-07-01 DIAGNOSIS — Z93.6 NEPHROSTOMY STATUS: Primary | ICD-10-CM

## 2024-07-01 DIAGNOSIS — I48.91 ATRIAL FIBRILLATION, UNSPECIFIED TYPE: ICD-10-CM

## 2024-07-01 DIAGNOSIS — Z78.9 IMPAIRED MOBILITY AND ADLS: ICD-10-CM

## 2024-07-01 DIAGNOSIS — F10.11 HISTORY OF ETOH ABUSE: ICD-10-CM

## 2024-07-01 DIAGNOSIS — D49.7 INTRADURAL EXTRAMEDULLARY SPINAL TUMOR: ICD-10-CM

## 2024-07-01 DIAGNOSIS — Z87.448 HISTORY OF ACUTE RENAL FAILURE: ICD-10-CM

## 2024-07-01 DIAGNOSIS — Z74.09 IMPAIRED MOBILITY AND ADLS: ICD-10-CM

## 2024-07-01 PROBLEM — Z23 INFLUENZA VACCINATION ADMINISTERED AT CURRENT VISIT: Status: RESOLVED | Noted: 2023-10-03 | Resolved: 2024-07-01

## 2024-07-01 RX ORDER — CLONIDINE HYDROCHLORIDE 0.1 MG/1
0.1 TABLET ORAL EVERY 6 HOURS PRN
COMMUNITY

## 2024-07-01 RX ORDER — OXYBUTYNIN CHLORIDE 5 MG/1
10 TABLET, EXTENDED RELEASE ORAL DAILY
COMMUNITY

## 2024-07-01 RX ORDER — MAGNESIUM OXIDE 400 MG/1
800 TABLET ORAL 2 TIMES DAILY
COMMUNITY

## 2024-07-01 RX ORDER — DOCUSATE SODIUM 100 MG/1
100 CAPSULE, LIQUID FILLED ORAL 2 TIMES DAILY PRN
COMMUNITY

## 2024-07-01 RX ORDER — SENNOSIDES 8.6 MG
650 CAPSULE ORAL EVERY 6 HOURS PRN
COMMUNITY

## 2024-07-01 RX ORDER — BISACODYL 5 MG/1
5 TABLET, DELAYED RELEASE ORAL DAILY PRN
COMMUNITY

## 2024-07-01 RX ORDER — HEPARIN SODIUM 5000 [USP'U]/ML
1 INJECTION, SOLUTION INTRAVENOUS; SUBCUTANEOUS EVERY 8 HOURS SCHEDULED
COMMUNITY

## 2024-07-01 RX ORDER — LANOLIN ALCOHOL/MO/W.PET/CERES
6 CREAM (GRAM) TOPICAL NIGHTLY PRN
COMMUNITY

## 2024-07-01 RX ORDER — CALCIUM CARBONATE 500 MG/1
1 TABLET, CHEWABLE ORAL EVERY 8 HOURS PRN
COMMUNITY

## 2024-07-01 RX ORDER — ONDANSETRON 4 MG/1
4 TABLET, FILM COATED ORAL EVERY 6 HOURS PRN
COMMUNITY

## 2024-07-01 RX ORDER — POTASSIUM CHLORIDE 20 MEQ/1
40 TABLET, EXTENDED RELEASE ORAL 2 TIMES DAILY
COMMUNITY

## 2024-07-01 RX ORDER — LANOLIN ALCOHOL/MO/W.PET/CERES
1000 CREAM (GRAM) TOPICAL DAILY
COMMUNITY

## 2024-07-01 RX ORDER — PANTOPRAZOLE SODIUM 40 MG/1
40 TABLET, DELAYED RELEASE ORAL DAILY
COMMUNITY

## 2024-07-01 RX ORDER — IPRATROPIUM BROMIDE AND ALBUTEROL SULFATE 2.5; .5 MG/3ML; MG/3ML
3 SOLUTION RESPIRATORY (INHALATION) EVERY 6 HOURS PRN
COMMUNITY

## 2024-07-01 RX ORDER — POLYETHYLENE GLYCOL 3350 17 G/17G
17 POWDER, FOR SOLUTION ORAL DAILY PRN
COMMUNITY
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
## 2024-07-02 DIAGNOSIS — I73.9 PERIPHERAL VASCULAR DISEASE OF LOWER EXTREMITY WITH ULCERATION: Primary | ICD-10-CM

## 2024-07-02 DIAGNOSIS — L97.909 PERIPHERAL VASCULAR DISEASE OF LOWER EXTREMITY WITH ULCERATION: Primary | ICD-10-CM
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
## 2024-07-03 ENCOUNTER — NURSING HOME (OUTPATIENT)
Dept: FAMILY MEDICINE CLINIC | Facility: CLINIC | Age: 74
End: 2024-07-03
Payer: MEDICARE

## 2024-07-03 VITALS
RESPIRATION RATE: 18 BRPM | TEMPERATURE: 97.8 F | SYSTOLIC BLOOD PRESSURE: 141 MMHG | DIASTOLIC BLOOD PRESSURE: 67 MMHG | HEART RATE: 74 BPM | OXYGEN SATURATION: 94 %

## 2024-07-03 DIAGNOSIS — E11.42 TYPE 2 DIABETES MELLITUS WITH DIABETIC POLYNEUROPATHY, WITHOUT LONG-TERM CURRENT USE OF INSULIN: Chronic | ICD-10-CM

## 2024-07-03 DIAGNOSIS — F10.10 ETOH ABUSE: ICD-10-CM

## 2024-07-03 DIAGNOSIS — J96.21 ACUTE AND CHRONIC RESPIRATORY FAILURE WITH HYPOXIA: ICD-10-CM

## 2024-07-03 DIAGNOSIS — I73.9 PERIPHERAL VASCULAR DISEASE: ICD-10-CM

## 2024-07-03 DIAGNOSIS — I48.20 CHRONIC ATRIAL FIBRILLATION: ICD-10-CM

## 2024-07-03 DIAGNOSIS — N13.9 ACUTE BILATERAL OBSTRUCTIVE UROPATHY: Primary | ICD-10-CM

## 2024-07-03 DIAGNOSIS — Z78.9 IMPAIRED MOBILITY AND ADLS: ICD-10-CM

## 2024-07-03 DIAGNOSIS — R54 AGE-RELATED PHYSICAL DEBILITY: ICD-10-CM

## 2024-07-03 DIAGNOSIS — R53.81 PHYSICAL DECONDITIONING: ICD-10-CM

## 2024-07-03 DIAGNOSIS — C32.1 SQUAMOUS CELL CARCINOMA OF EPIGLOTTIS: ICD-10-CM

## 2024-07-03 DIAGNOSIS — Z74.09 IMPAIRED MOBILITY AND ADLS: ICD-10-CM

## 2024-07-03 DIAGNOSIS — I71.43 INFRARENAL ABDOMINAL AORTIC ANEURYSM (AAA) WITHOUT RUPTURE: ICD-10-CM

## 2024-07-03 DIAGNOSIS — Z98.890 HISTORY OF LAMINECTOMY: ICD-10-CM

## 2024-07-03 PROCEDURE — 99306 1ST NF CARE HIGH MDM 50: CPT | Performed by: FAMILY MEDICINE

## 2024-07-05 PROBLEM — R53.81 PHYSICAL DECONDITIONING: Status: ACTIVE | Noted: 2024-07-05

## 2024-07-05 PROBLEM — N13.9 ACUTE BILATERAL OBSTRUCTIVE UROPATHY: Status: ACTIVE | Noted: 2024-07-05

## 2024-07-05 PROBLEM — R31.0 GROSS HEMATURIA: Status: RESOLVED | Noted: 2024-03-25 | Resolved: 2024-07-05

## 2024-07-05 PROBLEM — J96.21 ACUTE AND CHRONIC RESPIRATORY FAILURE WITH HYPOXIA: Status: ACTIVE | Noted: 2023-01-05

## 2024-07-05 PROBLEM — R54 AGE-RELATED PHYSICAL DEBILITY: Status: ACTIVE | Noted: 2024-07-05

## 2024-07-05 PROBLEM — Z78.9 IMPAIRED MOBILITY AND ADLS: Status: ACTIVE | Noted: 2024-07-05

## 2024-07-05 PROBLEM — I71.43 INFRARENAL ABDOMINAL AORTIC ANEURYSM (AAA) WITHOUT RUPTURE: Status: ACTIVE | Noted: 2024-07-05

## 2024-07-05 PROBLEM — Z74.09 IMPAIRED MOBILITY AND ADLS: Status: ACTIVE | Noted: 2024-07-05

## 2024-07-05 PROBLEM — E11.42 TYPE 2 DIABETES MELLITUS WITH DIABETIC POLYNEUROPATHY, WITHOUT LONG-TERM CURRENT USE OF INSULIN: Chronic | Status: ACTIVE | Noted: 2023-01-05
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
## 2024-07-09 ENCOUNTER — NURSING HOME (OUTPATIENT)
Dept: FAMILY MEDICINE CLINIC | Facility: CLINIC | Age: 74
End: 2024-07-09
Payer: MEDICARE

## 2024-07-09 VITALS
HEART RATE: 80 BPM | BODY MASS INDEX: 21.98 KG/M2 | TEMPERATURE: 98.2 F | OXYGEN SATURATION: 96 % | RESPIRATION RATE: 18 BRPM | SYSTOLIC BLOOD PRESSURE: 128 MMHG | DIASTOLIC BLOOD PRESSURE: 71 MMHG | WEIGHT: 162.1 LBS

## 2024-07-09 DIAGNOSIS — N30.01 ACUTE CYSTITIS WITH HEMATURIA: ICD-10-CM

## 2024-07-09 DIAGNOSIS — Z78.9 IMPAIRED MOBILITY AND ADLS: ICD-10-CM

## 2024-07-09 DIAGNOSIS — C61 PROSTATE CANCER: ICD-10-CM

## 2024-07-09 DIAGNOSIS — Z74.09 IMPAIRED MOBILITY AND ADLS: ICD-10-CM

## 2024-07-09 DIAGNOSIS — D50.0 IRON DEFICIENCY ANEMIA DUE TO CHRONIC BLOOD LOSS: ICD-10-CM

## 2024-07-09 DIAGNOSIS — Z87.19 HISTORY OF GI BLEED: ICD-10-CM

## 2024-07-09 DIAGNOSIS — I48.91 ATRIAL FIBRILLATION, UNSPECIFIED TYPE: ICD-10-CM

## 2024-07-09 DIAGNOSIS — N40.1 BENIGN PROSTATIC HYPERPLASIA WITH URINARY FREQUENCY: ICD-10-CM

## 2024-07-09 DIAGNOSIS — Z79.899 MEDICATION MANAGEMENT: ICD-10-CM

## 2024-07-09 DIAGNOSIS — R35.0 BENIGN PROSTATIC HYPERPLASIA WITH URINARY FREQUENCY: ICD-10-CM

## 2024-07-09 PROCEDURE — 99309 SBSQ NF CARE MODERATE MDM 30: CPT | Performed by: NURSE PRACTITIONER
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
## 2024-07-16 ENCOUNTER — TELEPHONE (OUTPATIENT)
Dept: CARDIOLOGY | Facility: CLINIC | Age: 74
End: 2024-07-16
Payer: MEDICARE

## 2024-07-16 NOTE — TELEPHONE ENCOUNTER
Called and spoke with nurse. Yi stated patient has been to [facility] cardiology most recent in June and that's where he is going to follow up at.

## 2024-07-16 NOTE — TELEPHONE ENCOUNTER
Caller: [facility]    Relationship to patient:     Best call back number: [phone]    Chief complaint: NEW ONSET OF A-FIB PER [facility].    Type of visit: F/U APPT    Requested date: NEXT AVAILABLE     Additional notes:PLEASE CONTACT [facility] IN REGARDS TO AN APPT DATE.

## 2024-07-17 ENCOUNTER — TELEPHONE (OUTPATIENT)
Dept: FAMILY MEDICINE CLINIC | Facility: CLINIC | Age: 74
End: 2024-07-17
Payer: MEDICARE

## 2024-07-17 NOTE — TELEPHONE ENCOUNTER
Called pt to reschedule appt 7/24 as Dr. Gillette will be out of the office. Hub may read and schedule.

## 2024-07-24 ENCOUNTER — READMISSION MANAGEMENT (OUTPATIENT)
Dept: CALL CENTER | Facility: HOSPITAL | Age: 74
End: 2024-07-24
Payer: MEDICARE

## 2024-07-24 NOTE — OUTREACH NOTE
Prep Survey      Flowsheet Row Responses   [facility] facility patient discharged from? Non-BH   Is LACE score < 7 ? Non-BH Discharge   Eligibility Not Eligible   What are the reasons patient is not eligible? Other  [No recent admit]   Does the patient have one of the following disease processes/diagnoses(primary or secondary)? Other   Prep survey completed? Yes            Lavinia CORCORAN - Registered Nurse

## 2024-07-30 ENCOUNTER — OFFICE VISIT (OUTPATIENT)
Dept: FAMILY MEDICINE CLINIC | Facility: CLINIC | Age: 74
End: 2024-07-30
Payer: MEDICARE

## 2024-07-30 VITALS
HEIGHT: 72 IN | OXYGEN SATURATION: 96 % | SYSTOLIC BLOOD PRESSURE: 116 MMHG | DIASTOLIC BLOOD PRESSURE: 74 MMHG | TEMPERATURE: 97.8 F | WEIGHT: 157.8 LBS | BODY MASS INDEX: 21.37 KG/M2 | HEART RATE: 68 BPM

## 2024-07-30 DIAGNOSIS — N40.1 BPH ASSOCIATED WITH NOCTURIA: ICD-10-CM

## 2024-07-30 DIAGNOSIS — Z85.46 HISTORY OF PROSTATE CANCER: ICD-10-CM

## 2024-07-30 DIAGNOSIS — F17.210 CIGARETTE SMOKER: ICD-10-CM

## 2024-07-30 DIAGNOSIS — R53.81 PHYSICAL DECONDITIONING: ICD-10-CM

## 2024-07-30 DIAGNOSIS — N13.9 ACUTE BILATERAL OBSTRUCTIVE UROPATHY: ICD-10-CM

## 2024-07-30 DIAGNOSIS — Z87.448 HISTORY OF ACUTE RENAL FAILURE: ICD-10-CM

## 2024-07-30 DIAGNOSIS — R35.1 BPH ASSOCIATED WITH NOCTURIA: ICD-10-CM

## 2024-07-30 DIAGNOSIS — D64.9 ANEMIA, UNSPECIFIED TYPE: ICD-10-CM

## 2024-07-30 DIAGNOSIS — R53.81 CHRONIC FATIGUE AND MALAISE: Primary | ICD-10-CM

## 2024-07-30 DIAGNOSIS — E11.42 TYPE 2 DIABETES MELLITUS WITH DIABETIC POLYNEUROPATHY, WITHOUT LONG-TERM CURRENT USE OF INSULIN: Chronic | ICD-10-CM

## 2024-07-30 DIAGNOSIS — I25.10 CORONARY ARTERY DISEASE INVOLVING NATIVE HEART WITHOUT ANGINA PECTORIS, UNSPECIFIED VESSEL OR LESION TYPE: ICD-10-CM

## 2024-07-30 DIAGNOSIS — Z85.819 HISTORY OF OROPHARYNGEAL CANCER: ICD-10-CM

## 2024-07-30 DIAGNOSIS — R31.0 GROSS HEMATURIA: ICD-10-CM

## 2024-07-30 DIAGNOSIS — J43.1 PANLOBULAR EMPHYSEMA: ICD-10-CM

## 2024-07-30 DIAGNOSIS — E55.9 VITAMIN D DEFICIENCY: ICD-10-CM

## 2024-07-30 DIAGNOSIS — Z78.9 IMPAIRED MOBILITY AND ADLS: ICD-10-CM

## 2024-07-30 DIAGNOSIS — I73.9 PERIPHERAL VASCULAR DISEASE: ICD-10-CM

## 2024-07-30 DIAGNOSIS — I71.43 INFRARENAL ABDOMINAL AORTIC ANEURYSM, WITHOUT RUPTURE: ICD-10-CM

## 2024-07-30 DIAGNOSIS — R53.82 CHRONIC FATIGUE AND MALAISE: Primary | ICD-10-CM

## 2024-07-30 DIAGNOSIS — Z85.118 HISTORY OF LUNG CANCER: ICD-10-CM

## 2024-07-30 DIAGNOSIS — Z74.09 IMPAIRED MOBILITY AND ADLS: ICD-10-CM

## 2024-07-30 DIAGNOSIS — I48.19 PERSISTENT ATRIAL FIBRILLATION: ICD-10-CM

## 2024-07-30 PROBLEM — N17.9 ACUTE KIDNEY FAILURE: Chronic | Status: ACTIVE | Noted: 2024-06-11

## 2024-07-30 PROBLEM — D51.9 VITAMIN B12 DEFICIENCY ANEMIA, UNSPECIFIED: Status: ACTIVE | Noted: 2024-06-28

## 2024-07-30 LAB
BILIRUB BLD-MCNC: NEGATIVE MG/DL
CLARITY, POC: ABNORMAL
COLOR UR: ABNORMAL
GLUCOSE UR STRIP-MCNC: NEGATIVE MG/DL
KETONES UR QL: NEGATIVE
LEUKOCYTE EST, POC: ABNORMAL
NITRITE UR-MCNC: NEGATIVE MG/ML
PH UR: 6 [PH] (ref 5–8)
PROT UR STRIP-MCNC: ABNORMAL MG/DL
RBC # UR STRIP: ABNORMAL /UL
SP GR UR: 1.02 (ref 1–1.03)
UROBILINOGEN UR QL: ABNORMAL

## 2024-07-30 RX ORDER — SULFAMETHOXAZOLE AND TRIMETHOPRIM 800; 160 MG/1; MG/1
1 TABLET ORAL 2 TIMES DAILY
Qty: 10 TABLET | Refills: 0 | Status: ON HOLD | OUTPATIENT
Start: 2024-07-30 | End: 2024-08-04

## 2024-07-30 RX ORDER — TAMSULOSIN HYDROCHLORIDE 0.4 MG/1
1 CAPSULE ORAL DAILY
Qty: 90 CAPSULE | Refills: 3 | Status: ON HOLD | OUTPATIENT
Start: 2024-07-30

## 2024-07-30 NOTE — ASSESSMENT & PLAN NOTE
Prior treatment with metformin, currently on diet control alone having been given as needed insulin during recent hospitalization, most recent hemoglobin A1c 5.1% in 4/2024.  Monitor periodically.

## 2024-07-30 NOTE — ASSESSMENT & PLAN NOTE
New diagnosis of infrarenal AAA status post prior repair of separate AAA.  To follow-up with CT surgery on 8/28/2024.

## 2024-07-30 NOTE — ASSESSMENT & PLAN NOTE
Patient's primary current complaint relates to urinary frequency, urgency, nocturia, having some cloudy urine with gross hematuria.  Not currently on anticoagulation or aspirin secondarily.  Had previously prescribed Flomax 0.4 mg nightly, noting currently taking oxybutynin XL 5 mg daily.  Empirically treating with Bactrim given abnormal urinalysis, with culture pending.  Assess clinical response.  Keep follow-up with urology 8/5/2024.

## 2024-07-30 NOTE — ASSESSMENT & PLAN NOTE
Patient having well-established chronic extensive PVD, status post prior AAA repair, new infrarenal AAA detected during recent hospitalization, status post amputation of right great toe secondarily.  Pursue risk factor modification anticipating reinitiation of statin after lipid profile obtained, continue aspirin as well as blood pressure control, strongly advised need to completely stop cigarette smoking.  To follow-up with CT surgery to assess his new AAA on 8/20/2024.

## 2024-07-30 NOTE — ASSESSMENT & PLAN NOTE
Gross hematuria noted on urinalysis today with leukocytes.  Has had chronic intermittent hematuria in the past.  He does have a history of prostate cancer, recent acute renal failure secondary to bilateral obstructive uropathy.  Not currently taking anticoagulation.  Empirically treat with Bactrim pending results of microscopic urinalysis and culture.  Continue holding aspirin and anticoagulation.  Keep appointment with urology on 8/5/2024.

## 2024-07-30 NOTE — ASSESSMENT & PLAN NOTE
Most recent hemoglobin 9.5 at hospital discharge last month following admission to ICU for obstructive uropathy with acute renal vera, anemia likely multifactorial, noting history of intermittent gross hematuria for which she will follow-up with urology.  Update testing including iron studies B12 and folic acid.  Colonoscopy 9/2021 with single adenoma sessile polyp, several hyperplastic polyps and large internal hemorrhoids.  Continue hold aspirin and anticoagulation.

## 2024-07-30 NOTE — ASSESSMENT & PLAN NOTE
History of longstanding COPD with heavy cigarette consumption, total 55 pack years currently having reduced from 1 pack/day down to 1 to 3 cigarettes daily at most current visit.  Has been multiple times advised strongly need to stop smoking completely.  Currently asymptomatic on no related respiratory meds.  He does have albuterol to use as needed.

## 2024-07-30 NOTE — PROGRESS NOTES
Follow Up Office Visit      Date: 2024   Patient Name: Stephane Noe  : 1950   MRN: [id number]     Chief Complaint:    Chief Complaint   Patient presents with    Follow-up     Been in hospital and nursing home       History of Present Illness: Stephane Noe is a 73 y.o. male who is here today for follow-up of multiple extensive health problems including diabetes, hypertension, coronary artery disease,, atrial fibrillation, COPD, diabetes mellitus type 2 with history of neuropathy, peripheral vascular disease with previous AAA repair, history of lung, oropharyngeal and prostate cancers followed by Dr. Ray of oncology..  Patient was evaluated at [facility] as I can best determine on 2024 having presented with general health decline, data deficient as patient has no awareness, taken by EMS after called by son, noted to be in acute renal failure and transferred to [facility], noted creatinine 9.1, potassium 6.7, hemoglobin 11.3, where he is admitted to ICU, had placement of bilateral nephrostomy tubes, treatment for hyperkalemia, received insulin for his diabetes, A-fib noted to have RVR appropriately treated, note of a new infrarenal AAA, and after an 11-day hospital stay at [facility] with discharge creatinine improved to 1.13, potassium 3.9, hemoglobin 9.5, was transferred to [facility] where he reports having stayed for a week, followed by subsequent discharge to nursing home in [place] where he stayed for approximately 3 weeks, having been discharged now 1 week ago.  I do not have all the details of these various hospitalizations.  Chronically has had diagnosis of A-fib, noted to be in RVR during his original hospitalization treated appropriately with current discharge taking metoprolol XL 25 mg at 0.5 tablets twice daily, not having any related symptoms.  His primary complaint relates to significant urinary  frequency and urgency with nocturia every 1/2 hour per his account, some dysuria, noting he has cloudy urine with some gross visualization of blood..  Patient has had significant fatigue and malaise, with a 14 pound weight loss noted in the last 1.5 months, and a 28 pound weight loss noted in the last 4 months.  Appetite has been diminished.  He denies chest pains palpitations dyspnea dizziness or edema.  He is to get home physical therapy intermittently, currently mobilizing with a rolling walker.  Extensive health records from [facility] as well as [facility] were reviewed by me today in detail.    Subjective      Review of Systems:   Review of Systems    I have reviewed the patients family history, social history, past medical history, past surgical history and have updated it as appropriate.     Medications:     Current Outpatient Medications:     acetaminophen (TYLENOL) 650 MG 8 hr tablet, Take 1 tablet by mouth Every 6 (Six) Hours As Needed for Mild Pain., Disp: , Rfl:     Cholecalciferol 125 MCG (5000 UT) tablet, Take 1 tablet by mouth Daily., Disp: , Rfl:     cloNIDine (CATAPRES) 0.1 MG tablet, Take 1 tablet by mouth Every 6 (Six) Hours As Needed for High Blood Pressure., Disp: , Rfl:     metoprolol tartrate (LOPRESSOR) 25 MG tablet, TAKE 1/2 TABLET TWICE DAILY (Patient taking differently: Take 1 tablet by mouth Daily.), Disp: 90 tablet, Rfl: 3    oxybutynin XL (DITROPAN-XL) 5 MG 24 hr tablet, Take 2 tablets by mouth Daily., Disp: , Rfl:     vitamin B-12 (CYANOCOBALAMIN) 1000 MCG tablet, Take 1 tablet by mouth Daily., Disp: , Rfl:     sulfamethoxazole-trimethoprim (Bactrim DS) 800-160 MG per tablet, Take 1 tablet by mouth 2 (Two) Times a Day for 5 days., Disp: 10 tablet, Rfl: 0    tamsulosin (FLOMAX) 0.4 MG capsule 24 hr capsule, Take 1 capsule by mouth Daily., Disp: 90 capsule, Rfl: 3    Allergies:   Allergies   Allergen Reactions    Quetiapine Unknown - Low Severity     "Lisinopril Cough       Objective     Physical Exam: Please see above  Vital Signs:   Vitals:    07/30/24 1448   BP: 116/74   BP Location: Left arm   Patient Position: Sitting   Cuff Size: Adult   Pulse: 68   Temp: 97.8 °F (36.6 °C)   TempSrc: Temporal   SpO2: 96%   Weight: 71.6 kg (157 lb 12.8 oz)   Height: 182.9 cm (72\")     Body mass index is 21.4 kg/m².  BMI is within normal parameters. No other follow-up for BMI required.       Physical Exam  Constitutional:       General: He is not in acute distress.     Appearance: He is ill-appearing. He is not toxic-appearing.      Comments: Chronically but not acutely ill-appearing very pleasant 73-year-old male, mobilizing with a rolling walker, very gaunt in appearance having a BMI of 21.4 reflecting a 14 pound weight loss in the last 1.5 months and a 28 pound weight loss in the last 4 months   HENT:      Mouth/Throat:      Mouth: Mucous membranes are moist.      Pharynx: Oropharynx is clear.      Comments: Dentures in place  Cardiovascular:      Rate and Rhythm: Normal rate. Rhythm irregular.      Heart sounds: Normal heart sounds. No murmur heard.     No friction rub. No gallop.   Pulmonary:      Effort: Pulmonary effort is normal. No respiratory distress.      Breath sounds: Normal breath sounds.   Abdominal:      General: Abdomen is flat. Bowel sounds are normal. There is no distension.      Palpations: Abdomen is soft. There is no mass.      Tenderness: There is no guarding or rebound.      Hernia: No hernia is present.   Musculoskeletal:         General: No swelling, tenderness or deformity. Normal range of motion.      Cervical back: No rigidity or tenderness.      Right lower leg: No edema.      Left lower leg: No edema.   Lymphadenopathy:      Cervical: No cervical adenopathy.   Neurological:      General: No focal deficit present.      Mental Status: He is alert and oriented to person, place, and time.      Cranial Nerves: No cranial nerve deficit.      Sensory: " No sensory deficit.      Motor: Weakness present.      Coordination: Coordination normal.      Gait: Gait abnormal.      Comments: Generalized weakness ambulating with a rolling walker, no focality noted   Psychiatric:         Mood and Affect: Mood normal.         Behavior: Behavior normal.         Thought Content: Thought content normal.         Judgment: Judgment normal.         Procedures    Results:   Labs:   Hemoglobin A1C   Date Value Ref Range Status   04/24/2024 5.1 4.5 - 5.7 % Final   02/27/2023 5.00 4.80 - 5.60 % Final     TSH   Date Value Ref Range Status   04/24/2024 3.900 0.450 - 4.500 uIU/mL Final   01/02/2024 4.420 (H) 0.358 - 3.740 uIU/mL Final        POCT Results (if applicable):   Results for orders placed or performed in visit on 07/30/24   POC Urinalysis Dipstick    Specimen: Urine   Result Value Ref Range    Color Red (A) Yellow, Straw, Dark Yellow, Anna    Clarity, UA Cloudy (A) Clear    Glucose, UA Negative Negative mg/dL    Bilirubin Negative Negative    Ketones, UA Negative Negative    Specific Gravity  1.020 1.005 - 1.030    Blood, UA 3+ (A) Negative    pH, Urine 6.0 5.0 - 8.0    Protein, POC 2+ (A) Negative mg/dL    Urobilinogen, UA 0.2 E.U./dL Normal, 0.2 E.U./dL    Leukocytes Small (1+) (A) Negative    Nitrite, UA Negative Negative         Assessment / Plan      Assessment/Plan:   Diagnoses and all orders for this visit:    1. Chronic fatigue and malaise (Primary)  Assessment & Plan:  Multifactorial including extensive health history with anticipated progression of physical decline, accentuated by recent hospitalization for acute renal failure, prolonged hospital course, subsequent admission to rehab hospital subcu mission to nursing home for ongoing PT and OT.  Home now for the last week, to start back on home PT and OT.  Check screening labs.    Orders:  -     TSH Rfx On Abnormal To Free T4; Future  -     CBC & Differential; Future  -     Comprehensive Metabolic Panel; Future  -      Comprehensive Metabolic Panel  -     CBC & Differential  -     TSH Rfx On Abnormal To Free T4    2. History of acute renal failure  Assessment & Plan:  Acute renal failure secondary to obstructive uropathy with creatinine 9.1, potassium 6.7, status post bilateral nephrostomy tubes placed with most recent creatinine 1.13 and GFR greater than 60, potassium 3.9.  Keep follow-up with urology on 8/5/2024.      3. Acute bilateral obstructive uropathy  Assessment & Plan:  Clinical resolution during recent hospitalization with secondary acute renal failure status post bilateral nephrostomy tube placement, data deficient.  Does follow-up with urology on 8/5/2024.      4. Physical deconditioning  Assessment & Plan:  Significant physical deconditioning progressive with history of extensive health problems including most recent hospitalization for acute renal failure, subsequent ICU admission with hospitalization x 11 days, followed by admission to rehab hospital at [facility] for approximate 1 week, followed by 3-week stay at nursing home for inpatient OT and PT.  He continues mobilization with a rolling walker and is to get home PT and OT in the near future.      5. Impaired mobility and ADLs    6. Coronary artery disease involving native heart without angina pectoris, unspecified vessel or lesion type  Assessment & Plan:  Patient with no current cardiovascular symptoms, history of noncritical CAD per [facility] in 10/2017 with associated permanent A-fib with RVR during recent hospitalization currently rate controlled taking metoprolol 12.5 mg or 0.5 tablets twice daily,, echocardiogram 1/2024 with normal-sized LV, mild LVH, EF 50% with indeterminate diastolic dysfunction, dilated RV, previously having been on Eliquis switched over to aspirin given gross hematuria and secondary poor risk-benefit ratio.  Strongly advised to stop smoking completely.      7. Persistent atrial fibrillation  Assessment & Plan:  Permanent A-fib  currently with controlled ventricular rate taking metoprolol.  Not currently on anticoagulation given history of gross hematuria and secondary poor risk benefit.      8. Type 2 diabetes mellitus with diabetic polyneuropathy, without long-term current use of insulin  Assessment & Plan:  Prior treatment with metformin, currently on diet control alone having been given as needed insulin during recent hospitalization, most recent hemoglobin A1c 5.1% in 4/2024.  Monitor periodically.    Orders:  -     Cancel: POC Glycosylated Hemoglobin (Hb A1C)  -     Cancel: POC Glucose, Blood  -     Hemoglobin A1c; Future  -     MicroAlbumin, Urine, Random - Urine, Clean Catch; Future  -     Hemoglobin A1c  -     MicroAlbumin, Urine, Random -    9. Panlobular emphysema  Assessment & Plan:  History of longstanding COPD with heavy cigarette consumption, total 55 pack years currently having reduced from 1 pack/day down to 1 to 3 cigarettes daily at most current visit.  Has been multiple times advised strongly need to stop smoking completely.  Currently asymptomatic on no related respiratory meds.  He does have albuterol to use as needed.          10. Peripheral vascular disease  Assessment & Plan:  Patient having well-established chronic extensive PVD, status post prior AAA repair, new infrarenal AAA detected during recent hospitalization, status post amputation of right great toe secondarily.  Pursue risk factor modification anticipating reinitiation of statin after lipid profile obtained, continue aspirin as well as blood pressure control, strongly advised need to completely stop cigarette smoking.  To follow-up with CT surgery to assess his new AAA on 8/20/2024.        11. Infrarenal abdominal aortic aneurysm, without rupture  Assessment & Plan:  New diagnosis of infrarenal AAA status post prior repair of separate AAA.  To follow-up with CT surgery on 8/28/2024.      12. Vitamin D deficiency  Assessment & Plan:  Previously on  supplement.  Update level.    Orders:  -     Vitamin D,25-Hydroxy; Future  -     Vitamin D,25-Hydroxy    13. Anemia, unspecified type  Assessment & Plan:  Most recent hemoglobin 9.5 at hospital discharge last month following admission to ICU for obstructive uropathy with acute renal vera, anemia likely multifactorial, noting history of intermittent gross hematuria for which she will follow-up with urology.  Update testing including iron studies B12 and folic acid.  Colonoscopy 9/2021 with single adenoma sessile polyp, several hyperplastic polyps and large internal hemorrhoids.  Continue hold aspirin and anticoagulation.    Orders:  -     CBC & Differential; Future  -     Iron Profile; Future  -     Ferritin; Future  -     Vitamin B12; Future  -     Folate; Future  -     Folate  -     Vitamin B12  -     Ferritin  -     Iron Profile  -     CBC & Differential    14. Cigarette smoker  Assessment & Plan:  Vies multiple times regarding need to completely abstain from cigarette smoking.      15. Gross hematuria  Assessment & Plan:  Gross hematuria noted on urinalysis today with leukocytes.  Has had chronic intermittent hematuria in the past.  He does have a history of prostate cancer, recent acute renal failure secondary to bilateral obstructive uropathy.  Not currently taking anticoagulation.  Empirically treat with Bactrim pending results of microscopic urinalysis and culture.  Continue holding aspirin and anticoagulation.  Keep appointment with urology on 8/5/2024.    Orders:  -     POC Urinalysis Dipstick  -     Urinalysis With Culture If Indicated -; Future  -     sulfamethoxazole-trimethoprim (Bactrim DS) 800-160 MG per tablet; Take 1 tablet by mouth 2 (Two) Times a Day for 5 days.  Dispense: 10 tablet; Refill: 0  -     Urinalysis With Culture If Indicated -    16. BPH associated with nocturia  Assessment & Plan:  Patient's primary current complaint relates to urinary frequency, urgency, nocturia, having some cloudy  urine with gross hematuria.  Not currently on anticoagulation or aspirin secondarily.  Had previously prescribed Flomax 0.4 mg nightly, noting currently taking oxybutynin XL 5 mg daily.  Empirically treating with Bactrim given abnormal urinalysis, with culture pending.  Assess clinical response.  Keep follow-up with urology 8/5/2024.    Orders:  -     tamsulosin (FLOMAX) 0.4 MG capsule 24 hr capsule; Take 1 capsule by mouth Daily.  Dispense: 90 capsule; Refill: 3    17. History of lung cancer  Assessment & Plan:  Keep follow-up with oncology      18. History of prostate cancer  Assessment & Plan:  Keep follow-up with oncology and urology      19. History of oropharyngeal cancer  Assessment & Plan:  Keep follow-up with oncology      50 minutes spent with patient reviewing his extensive hospital, and nursing home records, updating current history, performing physical exam, formulation of treatment plan and documentation in EMR.      Follow Up:   Return in about 1 month (around 8/30/2024) for Labs today, Recheck.      At [facility], we believe that sharing information builds trust and better relationships. You are receiving this note because you recently visited [facility]. It is possible you will see health information before a provider has talked with you about it. This kind of information can be easy to misunderstand. To help you fully understand what it means for your health, we urge you to discuss this note with your provider.    Nain Morel MD  [facility]

## 2024-07-30 NOTE — PROGRESS NOTES
Venipuncture Blood Specimen Collection  Venipuncture performed in right arm by Erin Gutierrez MA with good hemostasis. Patient tolerated the procedure well without complications.   07/30/24   Erin Gutierrez MA

## 2024-07-30 NOTE — ASSESSMENT & PLAN NOTE
Clinical resolution during recent hospitalization with secondary acute renal failure status post bilateral nephrostomy tube placement, data deficient.  Does follow-up with urology on 8/5/2024.

## 2024-07-30 NOTE — ASSESSMENT & PLAN NOTE
Patient with no current cardiovascular symptoms, history of noncritical CAD per [facility] in 10/2017 with associated permanent A-fib with RVR during recent hospitalization currently rate controlled taking metoprolol 12.5 mg or 0.5 tablets twice daily,, echocardiogram 1/2024 with normal-sized LV, mild LVH, EF 50% with indeterminate diastolic dysfunction, dilated RV, previously having been on Eliquis switched over to aspirin given gross hematuria and secondary poor risk-benefit ratio.  Strongly advised to stop smoking completely.

## 2024-07-30 NOTE — ASSESSMENT & PLAN NOTE
Acute renal failure secondary to obstructive uropathy with creatinine 9.1, potassium 6.7, status post bilateral nephrostomy tubes placed with most recent creatinine 1.13 and GFR greater than 60, potassium 3.9.  Keep follow-up with urology on 8/5/2024.

## 2024-07-30 NOTE — ASSESSMENT & PLAN NOTE
Permanent A-fib currently with controlled ventricular rate taking metoprolol.  Not currently on anticoagulation given history of gross hematuria and secondary poor risk benefit.

## 2024-07-30 NOTE — ASSESSMENT & PLAN NOTE
Multifactorial including extensive health history with anticipated progression of physical decline, accentuated by recent hospitalization for acute renal failure, prolonged hospital course, subsequent admission to rehab hospital subcu mission to nursing home for ongoing PT and OT.  Home now for the last week, to start back on home PT and OT.  Check screening labs.

## 2024-07-30 NOTE — ASSESSMENT & PLAN NOTE
Significant physical deconditioning progressive with history of extensive health problems including most recent hospitalization for acute renal failure, subsequent ICU admission with hospitalization x 11 days, followed by admission to rehab hospital at [facility] for approximate 1 week, followed by 3-week stay at nursing home for inpatient OT and PT.  He continues mobilization with a rolling walker and is to get home PT and OT in the near future.

## 2024-08-01 ENCOUNTER — TELEPHONE (OUTPATIENT)
Dept: FAMILY MEDICINE CLINIC | Facility: CLINIC | Age: 74
End: 2024-08-01
Payer: MEDICARE

## 2024-08-01 DIAGNOSIS — E03.9 HYPOTHYROIDISM, UNSPECIFIED TYPE: Primary | ICD-10-CM

## 2024-08-01 LAB
25(OH)D3+25(OH)D2 SERPL-MCNC: 37.3 NG/ML (ref 30–100)
ALBUMIN SERPL-MCNC: 3.1 G/DL (ref 3.8–4.8)
ALP SERPL-CCNC: 95 IU/L (ref 44–121)
ALT SERPL-CCNC: 5 IU/L (ref 0–44)
APPEARANCE UR: ABNORMAL
AST SERPL-CCNC: 9 IU/L (ref 0–40)
BACTERIA #/AREA URNS HPF: ABNORMAL /[HPF]
BACTERIA UR CULT: NORMAL
BACTERIA UR CULT: NORMAL
BASOPHILS # BLD AUTO: 0.1 X10E3/UL (ref 0–0.2)
BASOPHILS NFR BLD AUTO: 1 %
BILIRUB SERPL-MCNC: 0.3 MG/DL (ref 0–1.2)
BILIRUB UR QL STRIP: NEGATIVE
BUN SERPL-MCNC: 20 MG/DL (ref 8–27)
BUN/CREAT SERPL: 17 (ref 10–24)
CALCIUM SERPL-MCNC: 9.1 MG/DL (ref 8.6–10.2)
CASTS URNS QL MICRO: ABNORMAL /LPF
CHLORIDE SERPL-SCNC: 101 MMOL/L (ref 96–106)
CO2 SERPL-SCNC: 19 MMOL/L (ref 20–29)
COLOR UR: ABNORMAL
CREAT SERPL-MCNC: 1.21 MG/DL (ref 0.76–1.27)
EGFRCR SERPLBLD CKD-EPI 2021: 63 ML/MIN/1.73
EOSINOPHIL # BLD AUTO: 0.1 X10E3/UL (ref 0–0.4)
EOSINOPHIL NFR BLD AUTO: 1 %
EPI CELLS #/AREA URNS HPF: >10 /HPF (ref 0–10)
ERYTHROCYTE [DISTWIDTH] IN BLOOD BY AUTOMATED COUNT: 15.6 % (ref 11.6–15.4)
FERRITIN SERPL-MCNC: 287 NG/ML (ref 30–400)
FOLATE SERPL-MCNC: 11.3 NG/ML
GLOBULIN SER CALC-MCNC: 2.8 G/DL (ref 1.5–4.5)
GLUCOSE SERPL-MCNC: 98 MG/DL (ref 70–99)
GLUCOSE UR QL STRIP: NEGATIVE
HBA1C MFR BLD: 5.2 % (ref 4.8–5.6)
HCT VFR BLD AUTO: 29 % (ref 37.5–51)
HGB BLD-MCNC: 9.2 G/DL (ref 13–17.7)
HGB UR QL STRIP: ABNORMAL
IMM GRANULOCYTES # BLD AUTO: 0.1 X10E3/UL (ref 0–0.1)
IMM GRANULOCYTES NFR BLD AUTO: 1 %
IRON SATN MFR SERPL: 20 % (ref 15–55)
IRON SERPL-MCNC: 36 UG/DL (ref 38–169)
KETONES UR QL STRIP: NEGATIVE
LEUKOCYTE ESTERASE UR QL STRIP: ABNORMAL
LYMPHOCYTES # BLD AUTO: 1.3 X10E3/UL (ref 0.7–3.1)
LYMPHOCYTES NFR BLD AUTO: 16 %
MCH RBC QN AUTO: 32.3 PG (ref 26.6–33)
MCHC RBC AUTO-ENTMCNC: 31.7 G/DL (ref 31.5–35.7)
MCV RBC AUTO: 102 FL (ref 79–97)
MICRO URNS: ABNORMAL
MICROALBUMIN UR-MCNC: 568.8 UG/ML
MONOCYTES # BLD AUTO: 0.4 X10E3/UL (ref 0.1–0.9)
MONOCYTES NFR BLD AUTO: 6 %
MUCOUS THREADS URNS QL MICRO: PRESENT
NEUTROPHILS # BLD AUTO: 6 X10E3/UL (ref 1.4–7)
NEUTROPHILS NFR BLD AUTO: 75 %
NITRITE UR QL STRIP: NEGATIVE
PH UR STRIP: 5.5 [PH] (ref 5–7.5)
PLATELET # BLD AUTO: 237 X10E3/UL (ref 150–450)
POTASSIUM SERPL-SCNC: 4.5 MMOL/L (ref 3.5–5.2)
PROT SERPL-MCNC: 5.9 G/DL (ref 6–8.5)
PROT UR QL STRIP: ABNORMAL
RBC # BLD AUTO: 2.85 X10E6/UL (ref 4.14–5.8)
RBC #/AREA URNS HPF: >30 /HPF (ref 0–2)
SODIUM SERPL-SCNC: 135 MMOL/L (ref 134–144)
SP GR UR STRIP: 1.02 (ref 1–1.03)
T4 FREE SERPL-MCNC: 0.95 NG/DL (ref 0.82–1.77)
TIBC SERPL-MCNC: 180 UG/DL (ref 250–450)
TSH SERPL DL<=0.005 MIU/L-ACNC: 25.2 UIU/ML (ref 0.45–4.5)
UIBC SERPL-MCNC: 144 UG/DL (ref 111–343)
URINALYSIS REFLEX: ABNORMAL
UROBILINOGEN UR STRIP-MCNC: 0.2 MG/DL (ref 0.2–1)
VIT B12 SERPL-MCNC: 717 PG/ML (ref 232–1245)
WBC # BLD AUTO: 7.9 X10E3/UL (ref 3.4–10.8)
WBC #/AREA URNS HPF: >30 /HPF (ref 0–5)

## 2024-08-01 RX ORDER — LEVOTHYROXINE SODIUM 0.05 MG/1
50 TABLET ORAL DAILY
Qty: 90 TABLET | Refills: 0 | Status: ON HOLD | OUTPATIENT
Start: 2024-08-01

## 2024-08-01 NOTE — TELEPHONE ENCOUNTER
----- Message from Nain Morel sent at 8/1/2024  8:22 AM EDT -----  Please advise patient that his recent laboratory testing indicates blood in his urine but culture negative suggestive of source of bleeding being likely from standing for which he should follow-up with urology as scheduled, iron studies were all normal, anemia slightly progressive but overall stable, noting he does enlarged red blood cells within normal folic acid and B12 likely secondary to his history of alcohol use for which he has been advised to discontinue, diabetic screen and vitamin D all normal, has a new diagnosis of hypothyroidism or underactive thyroid for which we will call in thyroid tablet called levothyroxine to his [facility], with plan to repeat another thyroid level in 2 to 3 months.  No changes recommended other than addition of thyroid replacement pill as noted.    I have spoke with him regarding his lab results. He states he understands that Dr. Gillette called him in a new rx and that we will do labs in 3 months. TF

## 2024-08-04 ENCOUNTER — APPOINTMENT (OUTPATIENT)
Dept: CT IMAGING | Facility: HOSPITAL | Age: 74
DRG: 668 | End: 2024-08-04
Payer: MEDICARE

## 2024-08-04 ENCOUNTER — APPOINTMENT (OUTPATIENT)
Dept: GENERAL RADIOLOGY | Facility: HOSPITAL | Age: 74
DRG: 668 | End: 2024-08-04
Payer: MEDICARE

## 2024-08-04 ENCOUNTER — HOSPITAL ENCOUNTER (EMERGENCY)
Facility: HOSPITAL | Age: 74
Discharge: ANOTHER HOSPITAL | DRG: 668 | End: 2024-08-04
Attending: STUDENT IN AN ORGANIZED HEALTH CARE EDUCATION/TRAINING PROGRAM | Admitting: STUDENT IN AN ORGANIZED HEALTH CARE EDUCATION/TRAINING PROGRAM
Payer: MEDICARE

## 2024-08-04 ENCOUNTER — APPOINTMENT (OUTPATIENT)
Dept: MRI IMAGING | Facility: HOSPITAL | Age: 74
DRG: 668 | End: 2024-08-04
Payer: MEDICARE

## 2024-08-04 ENCOUNTER — HOSPITAL ENCOUNTER (INPATIENT)
Facility: HOSPITAL | Age: 74
LOS: 7 days | Discharge: HOME HEALTH | DRG: 668 | End: 2024-08-11
Attending: INTERNAL MEDICINE | Admitting: INTERNAL MEDICINE
Payer: MEDICARE

## 2024-08-04 VITALS
RESPIRATION RATE: 22 BRPM | SYSTOLIC BLOOD PRESSURE: 96 MMHG | TEMPERATURE: 97.2 F | DIASTOLIC BLOOD PRESSURE: 69 MMHG | OXYGEN SATURATION: 99 % | HEIGHT: 72 IN | HEART RATE: 67 BPM | BODY MASS INDEX: 23.03 KG/M2 | WEIGHT: 170 LBS

## 2024-08-04 DIAGNOSIS — R31.9 URINARY TRACT INFECTION WITH HEMATURIA, SITE UNSPECIFIED: ICD-10-CM

## 2024-08-04 DIAGNOSIS — N39.0 URINARY TRACT INFECTION WITH HEMATURIA, SITE UNSPECIFIED: ICD-10-CM

## 2024-08-04 DIAGNOSIS — J18.9 PNEUMONIA DUE TO INFECTIOUS ORGANISM, UNSPECIFIED LATERALITY, UNSPECIFIED PART OF LUNG: ICD-10-CM

## 2024-08-04 DIAGNOSIS — R31.9 HEMATURIA, UNSPECIFIED TYPE: Primary | ICD-10-CM

## 2024-08-04 DIAGNOSIS — G45.9 TRANSIENT ISCHEMIC ATTACK (TIA): Primary | ICD-10-CM

## 2024-08-04 DIAGNOSIS — R41.841 COGNITIVE COMMUNICATION DEFICIT: ICD-10-CM

## 2024-08-04 DIAGNOSIS — I48.91 ATRIAL FIBRILLATION WITH RVR: ICD-10-CM

## 2024-08-04 DIAGNOSIS — I65.23 BILATERAL CAROTID ARTERY STENOSIS: ICD-10-CM

## 2024-08-04 DIAGNOSIS — R29.818 NEUROLOGICAL DEFICIT, TRANSIENT: ICD-10-CM

## 2024-08-04 DIAGNOSIS — R47.01 APHASIA: ICD-10-CM

## 2024-08-04 DIAGNOSIS — N32.89 BLADDER MASS: ICD-10-CM

## 2024-08-04 DIAGNOSIS — D64.9 ANEMIA, UNSPECIFIED TYPE: ICD-10-CM

## 2024-08-04 DIAGNOSIS — N13.30 HYDRONEPHROSIS, UNSPECIFIED HYDRONEPHROSIS TYPE: ICD-10-CM

## 2024-08-04 DIAGNOSIS — N28.9 RENAL INSUFFICIENCY: ICD-10-CM

## 2024-08-04 DIAGNOSIS — I71.40 ABDOMINAL AORTIC ANEURYSM (AAA) WITHOUT RUPTURE, UNSPECIFIED PART: ICD-10-CM

## 2024-08-04 LAB
ABO GROUP BLD: NORMAL
ABO GROUP BLD: NORMAL
ALBUMIN SERPL-MCNC: 2.6 G/DL (ref 3.5–5.2)
ALBUMIN SERPL-MCNC: 3.4 G/DL (ref 3.5–5.2)
ALBUMIN/GLOB SERPL: 0.9 G/DL
ALBUMIN/GLOB SERPL: 1 G/DL
ALP SERPL-CCNC: 75 U/L (ref 39–117)
ALP SERPL-CCNC: 97 U/L (ref 39–117)
ALT SERPL W P-5'-P-CCNC: 5 U/L (ref 1–41)
ALT SERPL W P-5'-P-CCNC: <5 U/L (ref 1–41)
ANION GAP SERPL CALCULATED.3IONS-SCNC: 11 MMOL/L (ref 5–15)
ANION GAP SERPL CALCULATED.3IONS-SCNC: 13.5 MMOL/L (ref 5–15)
APTT PPP: 31.3 SECONDS (ref 70–100)
AST SERPL-CCNC: 11 U/L (ref 1–40)
AST SERPL-CCNC: 12 U/L (ref 1–40)
B PARAPERT DNA SPEC QL NAA+PROBE: NOT DETECTED
B PERT DNA SPEC QL NAA+PROBE: NOT DETECTED
BACTERIA UR QL AUTO: ABNORMAL /HPF
BACTERIA UR QL AUTO: ABNORMAL /HPF
BASOPHILS # BLD AUTO: 0.03 10*3/MM3 (ref 0–0.2)
BASOPHILS # BLD AUTO: 0.05 10*3/MM3 (ref 0–0.2)
BASOPHILS NFR BLD AUTO: 0.4 % (ref 0–1.5)
BASOPHILS NFR BLD AUTO: 0.6 % (ref 0–1.5)
BILIRUB SERPL-MCNC: 0.2 MG/DL (ref 0–1.2)
BILIRUB SERPL-MCNC: 0.4 MG/DL (ref 0–1.2)
BILIRUB UR QL STRIP: ABNORMAL
BILIRUB UR QL STRIP: ABNORMAL
BLD GP AB SCN SERPL QL: NEGATIVE
BUN SERPL-MCNC: 19 MG/DL (ref 8–23)
BUN SERPL-MCNC: 19 MG/DL (ref 8–23)
BUN/CREAT SERPL: 12.8 (ref 7–25)
BUN/CREAT SERPL: 13.8 (ref 7–25)
C PNEUM DNA NPH QL NAA+NON-PROBE: NOT DETECTED
CALCIUM SPEC-SCNC: 8.2 MG/DL (ref 8.6–10.5)
CALCIUM SPEC-SCNC: 9.5 MG/DL (ref 8.6–10.5)
CHLORIDE SERPL-SCNC: 102 MMOL/L (ref 98–107)
CHLORIDE SERPL-SCNC: 104 MMOL/L (ref 98–107)
CLARITY UR: ABNORMAL
CLARITY UR: ABNORMAL
CO2 SERPL-SCNC: 18.5 MMOL/L (ref 22–29)
CO2 SERPL-SCNC: 19 MMOL/L (ref 22–29)
COLOR UR: ABNORMAL
COLOR UR: ABNORMAL
CREAT SERPL-MCNC: 1.38 MG/DL (ref 0.76–1.27)
CREAT SERPL-MCNC: 1.48 MG/DL (ref 0.76–1.27)
D-LACTATE SERPL-SCNC: 1.7 MMOL/L (ref 0.5–2)
D-LACTATE SERPL-SCNC: 3.5 MMOL/L (ref 0.5–2)
DEPRECATED RDW RBC AUTO: 68.3 FL (ref 37–54)
DEPRECATED RDW RBC AUTO: 68.9 FL (ref 37–54)
EGFRCR SERPLBLD CKD-EPI 2021: 49.3 ML/MIN/1.73
EGFRCR SERPLBLD CKD-EPI 2021: 53.7 ML/MIN/1.73
EOSINOPHIL # BLD AUTO: 0.03 10*3/MM3 (ref 0–0.4)
EOSINOPHIL # BLD AUTO: 0.1 10*3/MM3 (ref 0–0.4)
EOSINOPHIL NFR BLD AUTO: 0.4 % (ref 0.3–6.2)
EOSINOPHIL NFR BLD AUTO: 1.2 % (ref 0.3–6.2)
ERYTHROCYTE [DISTWIDTH] IN BLOOD BY AUTOMATED COUNT: 18.6 % (ref 12.3–15.4)
ERYTHROCYTE [DISTWIDTH] IN BLOOD BY AUTOMATED COUNT: 18.6 % (ref 12.3–15.4)
FLUAV SUBTYP SPEC NAA+PROBE: NOT DETECTED
FLUBV RNA ISLT QL NAA+PROBE: NOT DETECTED
GEN 5 2HR TROPONIN T REFLEX: 95 NG/L
GLOBULIN UR ELPH-MCNC: 2.9 GM/DL
GLOBULIN UR ELPH-MCNC: 3.5 GM/DL
GLUCOSE BLDC GLUCOMTR-MCNC: 137 MG/DL (ref 70–130)
GLUCOSE BLDC GLUCOMTR-MCNC: 70 MG/DL (ref 70–130)
GLUCOSE BLDC GLUCOMTR-MCNC: 96 MG/DL (ref 70–130)
GLUCOSE SERPL-MCNC: 82 MG/DL (ref 65–99)
GLUCOSE SERPL-MCNC: 85 MG/DL (ref 65–99)
GLUCOSE UR STRIP-MCNC: NEGATIVE MG/DL
GLUCOSE UR STRIP-MCNC: NEGATIVE MG/DL
HADV DNA SPEC NAA+PROBE: NOT DETECTED
HCOV 229E RNA SPEC QL NAA+PROBE: NOT DETECTED
HCOV HKU1 RNA SPEC QL NAA+PROBE: NOT DETECTED
HCOV NL63 RNA SPEC QL NAA+PROBE: NOT DETECTED
HCOV OC43 RNA SPEC QL NAA+PROBE: NOT DETECTED
HCT VFR BLD AUTO: 30.2 % (ref 37.5–51)
HCT VFR BLD AUTO: 32.6 % (ref 37.5–51)
HGB BLD-MCNC: 10.5 G/DL (ref 13–17.7)
HGB BLD-MCNC: 9.5 G/DL (ref 13–17.7)
HGB UR QL STRIP.AUTO: ABNORMAL
HGB UR QL STRIP.AUTO: ABNORMAL
HMPV RNA NPH QL NAA+NON-PROBE: NOT DETECTED
HOLD SPECIMEN: NORMAL
HOLD SPECIMEN: NORMAL
HPIV1 RNA ISLT QL NAA+PROBE: NOT DETECTED
HPIV2 RNA SPEC QL NAA+PROBE: NOT DETECTED
HPIV3 RNA NPH QL NAA+PROBE: NOT DETECTED
HPIV4 P GENE NPH QL NAA+PROBE: NOT DETECTED
HYALINE CASTS UR QL AUTO: ABNORMAL /LPF
HYALINE CASTS UR QL AUTO: ABNORMAL /LPF
IMM GRANULOCYTES # BLD AUTO: 0.06 10*3/MM3 (ref 0–0.05)
IMM GRANULOCYTES # BLD AUTO: 0.08 10*3/MM3 (ref 0–0.05)
IMM GRANULOCYTES NFR BLD AUTO: 0.7 % (ref 0–0.5)
IMM GRANULOCYTES NFR BLD AUTO: 1 % (ref 0–0.5)
INR PPP: 0.97 (ref 0.9–1.1)
KETONES UR QL STRIP: ABNORMAL
KETONES UR QL STRIP: NEGATIVE
L PNEUMO1 AG UR QL IA: POSITIVE
LEUKOCYTE ESTERASE UR QL STRIP.AUTO: ABNORMAL
LEUKOCYTE ESTERASE UR QL STRIP.AUTO: ABNORMAL
LYMPHOCYTES # BLD AUTO: 0.86 10*3/MM3 (ref 0.7–3.1)
LYMPHOCYTES # BLD AUTO: 1.76 10*3/MM3 (ref 0.7–3.1)
LYMPHOCYTES NFR BLD AUTO: 10.5 % (ref 19.6–45.3)
LYMPHOCYTES NFR BLD AUTO: 21.1 % (ref 19.6–45.3)
M PNEUMO IGG SER IA-ACNC: NOT DETECTED
MAGNESIUM SERPL-MCNC: 1.8 MG/DL (ref 1.6–2.4)
MCH RBC QN AUTO: 32.2 PG (ref 26.6–33)
MCH RBC QN AUTO: 32.9 PG (ref 26.6–33)
MCHC RBC AUTO-ENTMCNC: 31.5 G/DL (ref 31.5–35.7)
MCHC RBC AUTO-ENTMCNC: 32.2 G/DL (ref 31.5–35.7)
MCV RBC AUTO: 102.2 FL (ref 79–97)
MCV RBC AUTO: 102.4 FL (ref 79–97)
MONOCYTES # BLD AUTO: 0.64 10*3/MM3 (ref 0.1–0.9)
MONOCYTES # BLD AUTO: 0.66 10*3/MM3 (ref 0.1–0.9)
MONOCYTES NFR BLD AUTO: 7.8 % (ref 5–12)
MONOCYTES NFR BLD AUTO: 7.9 % (ref 5–12)
MRSA DNA SPEC QL NAA+PROBE: NEGATIVE
NEUTROPHILS NFR BLD AUTO: 5.7 10*3/MM3 (ref 1.7–7)
NEUTROPHILS NFR BLD AUTO: 6.56 10*3/MM3 (ref 1.7–7)
NEUTROPHILS NFR BLD AUTO: 68.2 % (ref 42.7–76)
NEUTROPHILS NFR BLD AUTO: 80.2 % (ref 42.7–76)
NITRITE UR QL STRIP: POSITIVE
NITRITE UR QL STRIP: POSITIVE
NRBC BLD AUTO-RTO: 0 /100 WBC (ref 0–0.2)
NRBC BLD AUTO-RTO: 0 /100 WBC (ref 0–0.2)
PH UR STRIP.AUTO: 7.5 [PH] (ref 5–8)
PH UR STRIP.AUTO: <=5 [PH] (ref 5–8)
PLATELET # BLD AUTO: 197 10*3/MM3 (ref 140–450)
PLATELET # BLD AUTO: 223 10*3/MM3 (ref 140–450)
PMV BLD AUTO: 9.3 FL (ref 6–12)
PMV BLD AUTO: 9.4 FL (ref 6–12)
POTASSIUM SERPL-SCNC: 4.6 MMOL/L (ref 3.5–5.2)
POTASSIUM SERPL-SCNC: 4.8 MMOL/L (ref 3.5–5.2)
PROCALCITONIN SERPL-MCNC: 0.23 NG/ML (ref 0–0.25)
PROT SERPL-MCNC: 5.5 G/DL (ref 6–8.5)
PROT SERPL-MCNC: 6.9 G/DL (ref 6–8.5)
PROT UR QL STRIP: ABNORMAL
PROT UR QL STRIP: ABNORMAL
PROTHROMBIN TIME: 13.3 SECONDS (ref 12.3–15.1)
RBC # BLD AUTO: 2.95 10*6/MM3 (ref 4.14–5.8)
RBC # BLD AUTO: 3.19 10*6/MM3 (ref 4.14–5.8)
RBC # UR STRIP: ABNORMAL /HPF
RBC # UR STRIP: ABNORMAL /HPF
REF LAB TEST METHOD: ABNORMAL
REF LAB TEST METHOD: ABNORMAL
RH BLD: POSITIVE
RH BLD: POSITIVE
RHINOVIRUS RNA SPEC NAA+PROBE: NOT DETECTED
RSV RNA NPH QL NAA+NON-PROBE: NOT DETECTED
S PNEUM AG SPEC QL LA: NEGATIVE
SARS-COV-2 RNA NPH QL NAA+NON-PROBE: NOT DETECTED
SODIUM SERPL-SCNC: 134 MMOL/L (ref 136–145)
SODIUM SERPL-SCNC: 134 MMOL/L (ref 136–145)
SP GR UR STRIP: 1.01 (ref 1–1.03)
SP GR UR STRIP: 1.01 (ref 1–1.03)
SQUAMOUS #/AREA URNS HPF: ABNORMAL /HPF
SQUAMOUS #/AREA URNS HPF: ABNORMAL /HPF
T&S EXPIRATION DATE: NORMAL
TROPONIN T DELTA: 4 NG/L
TROPONIN T SERPL HS-MCNC: 87 NG/L
TROPONIN T SERPL HS-MCNC: 91 NG/L
UFH PPP CHRO-ACNC: 0.1 IU/ML (ref 0.3–0.7)
UROBILINOGEN UR QL STRIP: ABNORMAL
UROBILINOGEN UR QL STRIP: ABNORMAL
WBC # UR STRIP: ABNORMAL /HPF
WBC # UR STRIP: ABNORMAL /HPF
WBC NRBC COR # BLD AUTO: 8.18 10*3/MM3 (ref 3.4–10.8)
WBC NRBC COR # BLD AUTO: 8.35 10*3/MM3 (ref 3.4–10.8)
WHOLE BLOOD HOLD COAG: NORMAL
WHOLE BLOOD HOLD SPECIMEN: NORMAL

## 2024-08-04 PROCEDURE — 81001 URINALYSIS AUTO W/SCOPE: CPT | Performed by: STUDENT IN AN ORGANIZED HEALTH CARE EDUCATION/TRAINING PROGRAM

## 2024-08-04 PROCEDURE — 85610 PROTHROMBIN TIME: CPT | Performed by: PSYCHIATRY & NEUROLOGY

## 2024-08-04 PROCEDURE — 99291 CRITICAL CARE FIRST HOUR: CPT | Performed by: FAMILY MEDICINE

## 2024-08-04 PROCEDURE — 86900 BLOOD TYPING SEROLOGIC ABO: CPT

## 2024-08-04 PROCEDURE — 80053 COMPREHEN METABOLIC PANEL: CPT | Performed by: STUDENT IN AN ORGANIZED HEALTH CARE EDUCATION/TRAINING PROGRAM

## 2024-08-04 PROCEDURE — 87449 NOS EACH ORGANISM AG IA: CPT | Performed by: NURSE PRACTITIONER

## 2024-08-04 PROCEDURE — 25810000003 SODIUM CHLORIDE 0.9 % SOLUTION: Performed by: NURSE PRACTITIONER

## 2024-08-04 PROCEDURE — 70498 CT ANGIOGRAPHY NECK: CPT

## 2024-08-04 PROCEDURE — 85025 COMPLETE CBC W/AUTO DIFF WBC: CPT | Performed by: STUDENT IN AN ORGANIZED HEALTH CARE EDUCATION/TRAINING PROGRAM

## 2024-08-04 PROCEDURE — 25010000002 PIPERACILLIN SOD-TAZOBACTAM PER 1 G

## 2024-08-04 PROCEDURE — 0202U NFCT DS 22 TRGT SARS-COV-2: CPT | Performed by: NURSE PRACTITIONER

## 2024-08-04 PROCEDURE — 25810000003 SODIUM CHLORIDE 0.9 % SOLUTION: Performed by: FAMILY MEDICINE

## 2024-08-04 PROCEDURE — 86850 RBC ANTIBODY SCREEN: CPT

## 2024-08-04 PROCEDURE — 86901 BLOOD TYPING SEROLOGIC RH(D): CPT

## 2024-08-04 PROCEDURE — 25010000002 VANCOMYCIN 1.5 G RECONSTITUTED SOLUTION 1 EACH VIAL

## 2024-08-04 PROCEDURE — 96366 THER/PROPH/DIAG IV INF ADDON: CPT

## 2024-08-04 PROCEDURE — 81001 URINALYSIS AUTO W/SCOPE: CPT | Performed by: NURSE PRACTITIONER

## 2024-08-04 PROCEDURE — 93005 ELECTROCARDIOGRAM TRACING: CPT | Performed by: STUDENT IN AN ORGANIZED HEALTH CARE EDUCATION/TRAINING PROGRAM

## 2024-08-04 PROCEDURE — 84484 ASSAY OF TROPONIN QUANT: CPT | Performed by: STUDENT IN AN ORGANIZED HEALTH CARE EDUCATION/TRAINING PROGRAM

## 2024-08-04 PROCEDURE — 80053 COMPREHEN METABOLIC PANEL: CPT | Performed by: NURSE PRACTITIONER

## 2024-08-04 PROCEDURE — 99291 CRITICAL CARE FIRST HOUR: CPT

## 2024-08-04 PROCEDURE — 87086 URINE CULTURE/COLONY COUNT: CPT | Performed by: NURSE PRACTITIONER

## 2024-08-04 PROCEDURE — 84153 ASSAY OF PSA TOTAL: CPT | Performed by: UROLOGY

## 2024-08-04 PROCEDURE — 74177 CT ABD & PELVIS W/CONTRAST: CPT

## 2024-08-04 PROCEDURE — 87040 BLOOD CULTURE FOR BACTERIA: CPT

## 2024-08-04 PROCEDURE — 25810000003 SODIUM CHLORIDE 0.9 % SOLUTION

## 2024-08-04 PROCEDURE — 25010000002 HEPARIN (PORCINE) PER 1000 UNITS: Performed by: PSYCHIATRY & NEUROLOGY

## 2024-08-04 PROCEDURE — 85730 THROMBOPLASTIN TIME PARTIAL: CPT | Performed by: PSYCHIATRY & NEUROLOGY

## 2024-08-04 PROCEDURE — 83605 ASSAY OF LACTIC ACID: CPT | Performed by: NURSE PRACTITIONER

## 2024-08-04 PROCEDURE — 96375 TX/PRO/DX INJ NEW DRUG ADDON: CPT

## 2024-08-04 PROCEDURE — 36415 COLL VENOUS BLD VENIPUNCTURE: CPT

## 2024-08-04 PROCEDURE — 25810000003 SODIUM CHLORIDE 0.9 % SOLUTION 500 ML FLEX CONT

## 2024-08-04 PROCEDURE — 82948 REAGENT STRIP/BLOOD GLUCOSE: CPT

## 2024-08-04 PROCEDURE — 96376 TX/PRO/DX INJ SAME DRUG ADON: CPT

## 2024-08-04 PROCEDURE — 99204 OFFICE O/P NEW MOD 45 MIN: CPT | Performed by: PSYCHIATRY & NEUROLOGY

## 2024-08-04 PROCEDURE — 83605 ASSAY OF LACTIC ACID: CPT

## 2024-08-04 PROCEDURE — 70496 CT ANGIOGRAPHY HEAD: CPT

## 2024-08-04 PROCEDURE — 85025 COMPLETE CBC W/AUTO DIFF WBC: CPT | Performed by: NURSE PRACTITIONER

## 2024-08-04 PROCEDURE — 96361 HYDRATE IV INFUSION ADD-ON: CPT

## 2024-08-04 PROCEDURE — 87641 MR-STAPH DNA AMP PROBE: CPT | Performed by: NURSE PRACTITIONER

## 2024-08-04 PROCEDURE — 71045 X-RAY EXAM CHEST 1 VIEW: CPT

## 2024-08-04 PROCEDURE — A9577 INJ MULTIHANCE: HCPCS | Performed by: FAMILY MEDICINE

## 2024-08-04 PROCEDURE — 25510000001 IOPAMIDOL 61 % SOLUTION: Performed by: STUDENT IN AN ORGANIZED HEALTH CARE EDUCATION/TRAINING PROGRAM

## 2024-08-04 PROCEDURE — 71275 CT ANGIOGRAPHY CHEST: CPT

## 2024-08-04 PROCEDURE — 84484 ASSAY OF TROPONIN QUANT: CPT | Performed by: NURSE PRACTITIONER

## 2024-08-04 PROCEDURE — 85520 HEPARIN ASSAY: CPT | Performed by: PSYCHIATRY & NEUROLOGY

## 2024-08-04 PROCEDURE — 0 GADOBENATE DIMEGLUMINE 529 MG/ML SOLUTION: Performed by: FAMILY MEDICINE

## 2024-08-04 PROCEDURE — 84145 PROCALCITONIN (PCT): CPT

## 2024-08-04 PROCEDURE — 83735 ASSAY OF MAGNESIUM: CPT | Performed by: STUDENT IN AN ORGANIZED HEALTH CARE EDUCATION/TRAINING PROGRAM

## 2024-08-04 PROCEDURE — 96365 THER/PROPH/DIAG IV INF INIT: CPT

## 2024-08-04 PROCEDURE — 70553 MRI BRAIN STEM W/O & W/DYE: CPT

## 2024-08-04 RX ORDER — NICOTINE POLACRILEX 4 MG
15 LOZENGE BUCCAL
Status: DISCONTINUED | OUTPATIENT
Start: 2024-08-04 | End: 2024-08-04 | Stop reason: SDUPTHER

## 2024-08-04 RX ORDER — PANTOPRAZOLE SODIUM 40 MG/1
40 TABLET, DELAYED RELEASE ORAL
Status: DISCONTINUED | OUTPATIENT
Start: 2024-08-05 | End: 2024-08-11 | Stop reason: HOSPADM

## 2024-08-04 RX ORDER — DEXTROSE MONOHYDRATE 25 G/50ML
25 INJECTION, SOLUTION INTRAVENOUS
Status: DISCONTINUED | OUTPATIENT
Start: 2024-08-04 | End: 2024-08-11 | Stop reason: HOSPADM

## 2024-08-04 RX ORDER — SODIUM CHLORIDE 9 MG/ML
40 INJECTION, SOLUTION INTRAVENOUS AS NEEDED
Status: DISCONTINUED | OUTPATIENT
Start: 2024-08-04 | End: 2024-08-11 | Stop reason: HOSPADM

## 2024-08-04 RX ORDER — SODIUM CHLORIDE 0.9 % (FLUSH) 0.9 %
10 SYRINGE (ML) INJECTION AS NEEDED
Status: DISCONTINUED | OUTPATIENT
Start: 2024-08-04 | End: 2024-08-04 | Stop reason: HOSPADM

## 2024-08-04 RX ORDER — ACETAMINOPHEN 325 MG/1
650 TABLET ORAL EVERY 4 HOURS PRN
Status: DISCONTINUED | OUTPATIENT
Start: 2024-08-04 | End: 2024-08-04 | Stop reason: HOSPADM

## 2024-08-04 RX ORDER — NICOTINE POLACRILEX 4 MG
15 LOZENGE BUCCAL ONCE
Status: COMPLETED | OUTPATIENT
Start: 2024-08-04 | End: 2024-08-04

## 2024-08-04 RX ORDER — BISACODYL 10 MG
10 SUPPOSITORY, RECTAL RECTAL DAILY PRN
Status: DISCONTINUED | OUTPATIENT
Start: 2024-08-04 | End: 2024-08-11 | Stop reason: HOSPADM

## 2024-08-04 RX ORDER — ATORVASTATIN CALCIUM 40 MG/1
80 TABLET, FILM COATED ORAL NIGHTLY
Status: DISCONTINUED | OUTPATIENT
Start: 2024-08-04 | End: 2024-08-07

## 2024-08-04 RX ORDER — IBUPROFEN 600 MG/1
1 TABLET ORAL
Status: DISCONTINUED | OUTPATIENT
Start: 2024-08-04 | End: 2024-08-11 | Stop reason: HOSPADM

## 2024-08-04 RX ORDER — POLYETHYLENE GLYCOL 3350 17 G/17G
17 POWDER, FOR SOLUTION ORAL DAILY PRN
Status: DISCONTINUED | OUTPATIENT
Start: 2024-08-04 | End: 2024-08-11 | Stop reason: HOSPADM

## 2024-08-04 RX ORDER — SODIUM CHLORIDE 0.9 % (FLUSH) 0.9 %
10 SYRINGE (ML) INJECTION AS NEEDED
Status: DISCONTINUED | OUTPATIENT
Start: 2024-08-04 | End: 2024-08-11 | Stop reason: HOSPADM

## 2024-08-04 RX ORDER — LEVOTHYROXINE SODIUM 0.05 MG/1
50 TABLET ORAL
Status: DISCONTINUED | OUTPATIENT
Start: 2024-08-05 | End: 2024-08-11 | Stop reason: HOSPADM

## 2024-08-04 RX ORDER — DILTIAZEM HYDROCHLORIDE 5 MG/ML
5 INJECTION INTRAVENOUS ONCE
Status: COMPLETED | OUTPATIENT
Start: 2024-08-04 | End: 2024-08-04

## 2024-08-04 RX ORDER — IPRATROPIUM BROMIDE AND ALBUTEROL SULFATE 2.5; .5 MG/3ML; MG/3ML
3 SOLUTION RESPIRATORY (INHALATION) EVERY 6 HOURS PRN
Status: DISCONTINUED | OUTPATIENT
Start: 2024-08-04 | End: 2024-08-11 | Stop reason: HOSPADM

## 2024-08-04 RX ORDER — INSULIN LISPRO 100 [IU]/ML
2-7 INJECTION, SOLUTION INTRAVENOUS; SUBCUTANEOUS
Status: DISCONTINUED | OUTPATIENT
Start: 2024-08-04 | End: 2024-08-11 | Stop reason: HOSPADM

## 2024-08-04 RX ORDER — AMOXICILLIN 250 MG
2 CAPSULE ORAL 2 TIMES DAILY PRN
Status: DISCONTINUED | OUTPATIENT
Start: 2024-08-04 | End: 2024-08-11 | Stop reason: HOSPADM

## 2024-08-04 RX ORDER — ONDANSETRON 2 MG/ML
4 INJECTION INTRAMUSCULAR; INTRAVENOUS EVERY 6 HOURS PRN
Status: DISCONTINUED | OUTPATIENT
Start: 2024-08-04 | End: 2024-08-04 | Stop reason: HOSPADM

## 2024-08-04 RX ORDER — HEPARIN SODIUM 10000 [USP'U]/100ML
12 INJECTION, SOLUTION INTRAVENOUS
Status: DISCONTINUED | OUTPATIENT
Start: 2024-08-04 | End: 2024-08-04 | Stop reason: HOSPADM

## 2024-08-04 RX ORDER — ACETAMINOPHEN 650 MG/1
650 SUPPOSITORY RECTAL EVERY 4 HOURS PRN
Status: DISCONTINUED | OUTPATIENT
Start: 2024-08-04 | End: 2024-08-11 | Stop reason: HOSPADM

## 2024-08-04 RX ORDER — ACETAMINOPHEN 160 MG/5ML
650 SOLUTION ORAL EVERY 4 HOURS PRN
Status: DISCONTINUED | OUTPATIENT
Start: 2024-08-04 | End: 2024-08-11 | Stop reason: HOSPADM

## 2024-08-04 RX ORDER — SODIUM CHLORIDE 0.9 % (FLUSH) 0.9 %
10 SYRINGE (ML) INJECTION EVERY 12 HOURS SCHEDULED
Status: DISCONTINUED | OUTPATIENT
Start: 2024-08-04 | End: 2024-08-11 | Stop reason: HOSPADM

## 2024-08-04 RX ORDER — SODIUM CHLORIDE 9 MG/ML
75 INJECTION, SOLUTION INTRAVENOUS CONTINUOUS
Status: DISCONTINUED | OUTPATIENT
Start: 2024-08-04 | End: 2024-08-05

## 2024-08-04 RX ORDER — DEXTROSE MONOHYDRATE 25 G/50ML
25 INJECTION, SOLUTION INTRAVENOUS
Status: DISCONTINUED | OUTPATIENT
Start: 2024-08-04 | End: 2024-08-04 | Stop reason: SDUPTHER

## 2024-08-04 RX ORDER — NICOTINE 21 MG/24HR
1 PATCH, TRANSDERMAL 24 HOURS TRANSDERMAL EVERY 24 HOURS
Status: DISCONTINUED | OUTPATIENT
Start: 2024-08-04 | End: 2024-08-11 | Stop reason: HOSPADM

## 2024-08-04 RX ORDER — SODIUM CHLORIDE 0.9 % (FLUSH) 0.9 %
10 SYRINGE (ML) INJECTION EVERY 12 HOURS SCHEDULED
Status: DISCONTINUED | OUTPATIENT
Start: 2024-08-04 | End: 2024-08-04 | Stop reason: HOSPADM

## 2024-08-04 RX ORDER — BISACODYL 5 MG/1
5 TABLET, DELAYED RELEASE ORAL DAILY PRN
Status: DISCONTINUED | OUTPATIENT
Start: 2024-08-04 | End: 2024-08-11 | Stop reason: HOSPADM

## 2024-08-04 RX ORDER — NICOTINE POLACRILEX 4 MG
15 LOZENGE BUCCAL
Status: DISCONTINUED | OUTPATIENT
Start: 2024-08-04 | End: 2024-08-11 | Stop reason: HOSPADM

## 2024-08-04 RX ORDER — HEPARIN SODIUM 10000 [USP'U]/100ML
12 INJECTION, SOLUTION INTRAVENOUS
Status: DISCONTINUED | OUTPATIENT
Start: 2024-08-04 | End: 2024-08-06

## 2024-08-04 RX ORDER — SODIUM CHLORIDE 9 MG/ML
40 INJECTION, SOLUTION INTRAVENOUS AS NEEDED
Status: DISCONTINUED | OUTPATIENT
Start: 2024-08-04 | End: 2024-08-04 | Stop reason: HOSPADM

## 2024-08-04 RX ORDER — DILTIAZEM HCL/D5W 125 MG/125
5-15 PLASTIC BAG, INJECTION (ML) INTRAVENOUS
Status: DISCONTINUED | OUTPATIENT
Start: 2024-08-04 | End: 2024-08-05

## 2024-08-04 RX ORDER — ACETAMINOPHEN 325 MG/1
650 TABLET ORAL EVERY 4 HOURS PRN
Status: DISCONTINUED | OUTPATIENT
Start: 2024-08-04 | End: 2024-08-11 | Stop reason: HOSPADM

## 2024-08-04 RX ORDER — NALOXONE HCL 0.4 MG/ML
0.4 VIAL (ML) INJECTION
Status: DISCONTINUED | OUTPATIENT
Start: 2024-08-04 | End: 2024-08-11 | Stop reason: HOSPADM

## 2024-08-04 RX ORDER — BISACODYL 10 MG
10 SUPPOSITORY, RECTAL RECTAL DAILY PRN
Status: DISCONTINUED | OUTPATIENT
Start: 2024-08-04 | End: 2024-08-04 | Stop reason: HOSPADM

## 2024-08-04 RX ORDER — ALUMINA, MAGNESIA, AND SIMETHICONE 2400; 2400; 240 MG/30ML; MG/30ML; MG/30ML
7.5 SUSPENSION ORAL EVERY 4 HOURS PRN
Status: DISCONTINUED | OUTPATIENT
Start: 2024-08-04 | End: 2024-08-04 | Stop reason: HOSPADM

## 2024-08-04 RX ORDER — DEXTROSE MONOHYDRATE 100 MG/ML
50 INJECTION, SOLUTION INTRAVENOUS CONTINUOUS
Status: DISCONTINUED | OUTPATIENT
Start: 2024-08-04 | End: 2024-08-04

## 2024-08-04 RX ORDER — IBUPROFEN 600 MG/1
1 TABLET ORAL
Status: DISCONTINUED | OUTPATIENT
Start: 2024-08-04 | End: 2024-08-04 | Stop reason: SDUPTHER

## 2024-08-04 RX ORDER — ATORVASTATIN CALCIUM 40 MG/1
80 TABLET, FILM COATED ORAL NIGHTLY
Status: DISCONTINUED | OUTPATIENT
Start: 2024-08-04 | End: 2024-08-04 | Stop reason: HOSPADM

## 2024-08-04 RX ORDER — MORPHINE SULFATE 2 MG/ML
1 INJECTION, SOLUTION INTRAMUSCULAR; INTRAVENOUS EVERY 4 HOURS PRN
Status: DISPENSED | OUTPATIENT
Start: 2024-08-04 | End: 2024-08-09

## 2024-08-04 RX ORDER — ACETAMINOPHEN 650 MG/1
650 SUPPOSITORY RECTAL EVERY 4 HOURS PRN
Status: DISCONTINUED | OUTPATIENT
Start: 2024-08-04 | End: 2024-08-04

## 2024-08-04 RX ORDER — NICOTINE POLACRILEX 4 MG
LOZENGE BUCCAL
Status: COMPLETED
Start: 2024-08-04 | End: 2024-08-04

## 2024-08-04 RX ORDER — LANOLIN ALCOHOL/MO/W.PET/CERES
1000 CREAM (GRAM) TOPICAL DAILY
Status: DISCONTINUED | OUTPATIENT
Start: 2024-08-05 | End: 2024-08-11 | Stop reason: HOSPADM

## 2024-08-04 RX ORDER — HYDROCODONE BITARTRATE AND ACETAMINOPHEN 5; 325 MG/1; MG/1
1 TABLET ORAL EVERY 6 HOURS PRN
Status: DISPENSED | OUTPATIENT
Start: 2024-08-04 | End: 2024-08-09

## 2024-08-04 RX ADMIN — Medication 10 ML: at 12:50

## 2024-08-04 RX ADMIN — HEPARIN SODIUM 12 UNITS/KG/HR: 10000 INJECTION, SOLUTION INTRAVENOUS at 14:23

## 2024-08-04 RX ADMIN — SODIUM CHLORIDE 75 ML/HR: 9 INJECTION, SOLUTION INTRAVENOUS at 18:23

## 2024-08-04 RX ADMIN — SODIUM CHLORIDE 1000 ML: 9 INJECTION, SOLUTION INTRAVENOUS at 10:42

## 2024-08-04 RX ADMIN — VANCOMYCIN HYDROCHLORIDE 1500 MG: 1.5 INJECTION, POWDER, LYOPHILIZED, FOR SOLUTION INTRAVENOUS at 13:53

## 2024-08-04 RX ADMIN — ATORVASTATIN CALCIUM 80 MG: 40 TABLET, FILM COATED ORAL at 20:13

## 2024-08-04 RX ADMIN — PIPERACILLIN SODIUM AND TAZOBACTAM SODIUM 3.38 G: 3; .375 INJECTION, POWDER, LYOPHILIZED, FOR SOLUTION INTRAVENOUS at 13:33

## 2024-08-04 RX ADMIN — IOPAMIDOL 100 ML: 612 INJECTION, SOLUTION INTRAVENOUS at 13:31

## 2024-08-04 RX ADMIN — GADOBENATE DIMEGLUMINE 10 ML: 529 INJECTION, SOLUTION INTRAVENOUS at 23:19

## 2024-08-04 RX ADMIN — IOPAMIDOL 100 ML: 612 INJECTION, SOLUTION INTRAVENOUS at 11:32

## 2024-08-04 RX ADMIN — DEXTROSE MONOHYDRATE 250 ML: 100 INJECTION, SOLUTION INTRAVENOUS at 12:25

## 2024-08-04 RX ADMIN — Medication 15 G: at 12:12

## 2024-08-04 RX ADMIN — SODIUM CHLORIDE 500 ML: 9 INJECTION, SOLUTION INTRAVENOUS at 17:47

## 2024-08-04 RX ADMIN — DILTIAZEM HYDROCHLORIDE 5 MG: 5 INJECTION, SOLUTION INTRAVENOUS at 11:43

## 2024-08-04 RX ADMIN — Medication 10 ML: at 20:13

## 2024-08-04 RX ADMIN — SODIUM CHLORIDE 5 MG/HR: 900 INJECTION, SOLUTION INTRAVENOUS at 11:45

## 2024-08-04 NOTE — ED NOTES
with [facility] called back at this time to speak with SUAD Christensen. Call transferred.

## 2024-08-04 NOTE — ED NOTES
Pt returned from CT and is unable to verbalize his name, unable to follow commands. Provider notified and at bed side.

## 2024-08-04 NOTE — ED NOTES
Called pharmacy to check compatibility of IV meds. Pharmacist reports vancomycin and cardizem are Y-site compatible.

## 2024-08-04 NOTE — PROGRESS NOTES
"Pharmacy Consult - Vancomycin Dosing and Monitoring (Renal Dysfunction / Dialysis)    Stephane Noe is a 73 y.o. male receiving vancomycin therapy.     Indication: pneumonia  Consulting Provider: Hospitalist  ID Consult: No    Goal Trough: 10-15 mcg/mL    Current Antimicrobial Therapy  Anti-Infectives (From admission, onward)      Ordered     Dose/Rate Route Frequency Start Stop    08/04/24 1752  piperacillin-tazobactam (ZOSYN) 3.375 g IVPB in 100 mL NS MBP (CD)        Note to Pharmacy: Please time abx from first doses given at [facility] ER today.  UTI and Bilat PNA   Ordering Provider: Yuly Uribe APRN    3.375 g  over 4 Hours Intravenous Every 8 Hours 08/05/24 2200 08/10/24 2159    08/04/24 1722  Pharmacy to dose vancomycin        Ordering Provider: Yuly Uribe APRN     Does not apply Continuous PRN 08/04/24 1718 08/09/24 1717          Allergies  Allergies as of 08/04/2024 - Reviewed 08/04/2024   Allergen Reaction Noted    Quetiapine Unknown - Low Severity 06/28/2024    Lisinopril Cough 10/27/2022     Labs  Results from last 7 days   Lab Units 08/04/24  1647 08/04/24  0950 07/30/24  1549   BUN mg/dL 19 19 20   CREATININE mg/dL 1.38* 1.48* 1.21     Results from last 7 days   Lab Units 08/04/24  1647 08/04/24  0950 07/30/24  1549   WBC 10*3/mm3 8.18 8.35 7.9     Evaluation of Dosing     Last Dose Received in the ED/Outside Facility: vancomycin 1500 mg IV x1 8/4 at 1400 at [facility]  Is Patient on Dialysis or Renal Replacement: No    Height - 182.9 cm (72\")  Weight - 71.3 kg (157 lb 3 oz)    Estimated Creatinine Clearance: 47.4 mL/min (A) (by C-G formula based on SCr of 1.38 mg/dL (H)).    Intake & Output (last 3 days)       None          Microbiology  Microbiology Results (last 10 days)       Procedure Component Value - Date/Time    Respiratory Panel PCR w/COVID-19(SARS-CoV-2) MORALES/DUNG/ERWIN/PAD/COR/ANDIE In-House, NP Swab in [facility]/VTM, 2 HR TAT - Swab, Nasopharynx [[id number]]  (Normal) Collected: " 08/04/24 1716    Lab Status: Final result Specimen: Swab from Nasopharynx Updated: 08/04/24 1804     ADENOVIRUS, PCR Not Detected     Coronavirus 229E Not Detected     Coronavirus HKU1 Not Detected     Coronavirus NL63 Not Detected     Coronavirus OC43 Not Detected     COVID19 Not Detected     Human Metapneumovirus Not Detected     Human Rhinovirus/Enterovirus Not Detected     Influenza A PCR Not Detected     Influenza B PCR Not Detected     Parainfluenza Virus 1 Not Detected     Parainfluenza Virus 2 Not Detected     Parainfluenza Virus 3 Not Detected     Parainfluenza Virus 4 Not Detected     RSV, PCR Not Detected     Bordetella pertussis pcr Not Detected     Bordetella parapertussis PCR Not Detected     Chlamydophila pneumoniae PCR Not Detected     Mycoplasma pneumo by PCR Not Detected    Narrative:      In the setting of a positive respiratory panel with a viral infection PLUS a negative procalcitonin without other underlying concern for bacterial infection, consider observing off antibiotics or discontinuation of antibiotics and continue supportive care. If the respiratory panel is positive for atypical bacterial infection (Bordetella pertussis, Chlamydophila pneumoniae, or Mycoplasma pneumoniae), consider antibiotic de-escalation to target atypical bacterial infection.    Urine culture, Comprehensive - , [[id number]] Collected: 07/30/24 1549    Lab Status: Final result Updated: 08/01/24 0707     Urine Culture Final report     Result 1 Comment     Comment: Mixed urogenital hermelinda  800  Colonies/mL         Narrative:      Performed at:  [address] [place] [address]  [id number]  : Alessio Koo PhD, Phone:  [phone]          Vancomycin Levels              Assessment/Plan:    Pharmacy to dose vancomycin for pneumonia  Patient received a loading dose of vancomycin 1500 mg IV x1 at [facility] on 8/4 at 1400.    Due to current LAY, plan to assess clearance by  obtaining vancomycin random on 8/5 AM.  Will monitor renal function, culture and sensitivities, and clinical status and adjust regimen as needed. Pharmacy will continue to follow       Ellyn Miller RPH  8/4/2024  18:08 EDT

## 2024-08-04 NOTE — ED PROVIDER NOTES
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 as well. Appearance is stable from prior. There is no free fluid or adenopathy. Streak artifact from patient's arm position noted. There is motion artifact on some images as well.  PELVIS: The GI tract demonstrates no obstruction.  The appendix is not definitely identified but could be missed secondary to motion/streak artifact. The urinary bladder is unremarkable. There is no free fluid, adenopathy, or inflammatory process.      Impression: Persistent, moderate left hydronephrosis despite ureteral stent placement.  Worsened right moderate hydronephrosis which appears to be secondary to a right posterior bladder wall mass with some extension of the enhancing mass into the distal right ureter.  Stable appearance of the infrarenal abdominal aortic aneurysm with extension into the common iliac arteries.  CTDI: 8.21 mGy DLP:737.75 mGy.cm  This report was signed and finalized on 8/4/2024 1:07 PM by Rebecca Godwin MD.      CT Angiogram Chest Pulmonary Embolism    Result Date: 8/4/2024  PROCEDURE: CT ANGIOGRAM CHEST PULMONARY EMBOLISM-  HISTORY: tachycardia, rule out PE not on anticoagulation hx of afib, history of stage IIIa adenocarcinoma of the right lung.  COMPARISON: June 11, 2024.  TECHNIQUE: The patient was injected with  IV contrast. Axial images were obtained through the chest in a  CTA/ PE protocol. 3 D Reconstruction images were also performed. This study was performed with techniques to keep radiation doses as low as reasonably achievable, (ALARA). Individualized dose reduction techniques using automated exposure control or adjustment of mA and/or kV according to the patient size were employed.  FINDINGS: There is no axillary adenopathy. There is no hilar or mediastinal adenopathy.  The heart is proper size. There is no pericardial or pleural effusion. There is mild emphysematous change. No filling defects are identified to suggest PE. There is minimal dilatation of the ascending aorta up to 41 mm, stable from prior. Cannot exclude dissection secondary to timing of the contrast bolus.. Limited images of the upper abdomen demonstrate partial visualization of the gallbladder which appears normal. The residual mass/central right upper lobe airspace disease is stable from the prior exam. Multiple air bronchograms noted in this region, stable. There are new, faint, peripheral groundglass opacities suggesting pneumonia, possibly viral. Vascular calcifications noted. Streak artifact from patient's arm position noted.      Impression: No evidence of pulmonary embolism.  Suspected pneumonia, possibly viral.   CTDI: 8.21 mGy DLP:737.75 mGy.cm  This report was signed and finalized on 8/4/2024 12:11 PM by Rebecca Godwin MD.      XR Chest 1 View    Result Date: 8/4/2024  PROCEDURE: XR CHEST 1 VW-  HISTORY: Weak/Dizzy/AMS triage protocol, penile bleeding for 4 to 5 months.  COMPARISON: June 11, 2024.  FINDINGS: The heart is normal in size. Left lung is clear. There is right perihilar mass/airspace disease and linear densities which are stable from prior exam.. The mediastinum is unremarkable. There is no pneumothorax.  There are no acute osseous abnormalities. Tortuosity of the thoracic aorta noted. Apical lordotic positioning noted. Aortic mural calcifications noted.      Impression: Stable chest..      This report was signed and finalized on 8/4/2024 10:15 AM by Rebecca Godwin MD.          [facility]      Initial impression of presenting illness: This is a 73 male with complicated past medical history presented for evaluation of generalized weakness, hematuria.    DDX: includes but is not limited to: Obstructive uropathy, acute renal failure, bladder mass or carcinoma, bladder stone, nephrolithiasis, ureteral stent, UPJ obstruction, hemorrhagic cystitis, UTI, others    Patient arrives hemodynamically stable afebrile tachycardic mildly tachypneic nonhypoxic on room air with vitals interpreted by myself.     Pertinent features from physical exam: Chronically ill-appearing 73-year-old male no acute distress, alert and orient x 4, no tachypnea, lungs were clear, cardiac auscultation tachycardic rate irregular rhythm.  Abdomen soft, no peritonitis but he does have tenderness to palpation above the suprapubic region.  Negative CVA tenderness bilaterally.  Multiple bruises in various stages of healing on the back    Initial diagnostic plan: CBC CMP troponin magnesium urinalysis CT abdomen pelvis chest x-ray    Results from initial plan were reviewed and interpreted by me revealing CBC hemoglobin of 10.5 hematocrit of 32.6 initial troponin of 91, delta 4, however this is likely secondary to his persistent tachycardia given that he remained tachycardic before diltiazem administration and drip was administered and troponin was drawn before this.  He also has what appears to be a infectious pattern on urine with moderate leukocytes nitrite positive 2+ bacteria and unable to determine white blood cells.  There is blood identified with too numerous to count red blood cells.  Magnesium was normal.  Creatinine of 1.48.  Chest x-ray stable chest per radiology interpretation.    Diagnostic information from other sources: Record reviewed extensively.    Interventions / Re-evaluation: 1 L fluids.  He continued to be tachycardic despite 1 L  fluids, was given Cardizem bolus and infusion, better control of his A-fib RVR with heart rate around 110 115.  He was given vancomycin and Zosyn for pneumonia identified on CT imaging due to recent multiple hospitalizations with [facility] well as actual hospitalization.  He was given oral glucose as patient did have an episode of aphasia after returning from the CT scanner where he remained aphasic for approximately 15 minutes however just prior to oral glucose administration and D10, his blood sugar was 70, he became alert and oriented again prior to the medication administration of oral glucose or D10 and normal saline however was given these anyway and had a evaluation by neurology who noted that he is not a TNK candidate.  They noted despite his hematuria that he would need to be anticoagulated with his A-fib and RVR and recommended heparin initiation which was started.  This was given without the bolus.  He has remained hemodynamically stable now.  Patient will need to be transferred for higher level of care with urology coverage secondary to his hematuria.    Results/clinical rationale were discussed with patient at bedside.  Agreeable to transfer to higher level of care.    Consultations/Discussion of results with other physicians: Discussed with cardiology at [facility], recommended transfer to their facility, I then discussed with urology, Dr. White who noted that this was not his patient and would need to speak to the on-call provider for [facility] through [facility].  Spoke with Dr. Godwin, who agreed to see the patient in consult at [facility].  I then spoke with Dr. James, hospitalist.  I did speak with Dr. Pal from neurology who noted that he is not a TNK candidate but recommended heparinization despite his hematuria and he placed the orders for the heparin to be administered without bolus.  Noted that he is not a TNK  candidate, performed neurologic evaluation at bedside and patient had a seeming return to normal without any aphasic symptoms and relatively normal neurologic exam    Disposition plan: Transfer to outside facility via EMS.      60 minutes of critical care provided. This time excludes other billable procedures. Time does include preparation of documents, medical consultations, review of old records, and direct bedside care. Patient is at high risk for life-threatening deterioration due to pneumonia, atrial fibrillation with RVR requiring Cardizem drip and push dose, aphasia episode while in the emergency department requiring neurology consult.    -----    Final diagnoses:   Hematuria, unspecified type   Atrial fibrillation with RVR   Pneumonia due to infectious organism, unspecified laterality, unspecified part of lung   Aphasia   Urinary tract infection with hematuria, site unspecified   Renal insufficiency   Anemia, unspecified type   Bilateral carotid artery stenosis   Hydronephrosis, unspecified hydronephrosis type   Bladder mass   Abdominal aortic aneurysm (AAA) without rupture, unspecified part          Fermin Hugo PA-C  08/04/24 [id number]
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
## 2024-08-04 NOTE — ED NOTES
[facility] called back at this time with the hospitalist to speak with SUAD Christensen. Call transferred.

## 2024-08-04 NOTE — H&P
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 intracranial circulation less than 50% bilateral ICA stenosis.  CTA chest with no PE but bilateral pneumonia, possibly viral noted.  Also found to be in A-fib RVR with heart rate of 161 and hypotension.  Started on heparin drip and diltiazem in ER.  Given vancomycin and Zosyn.  More labs review shows June of this year with urine Legionella positive (data deficit).  Blood cultures pending.  Dr. Mayfield with neurology at [facility] spoke with [facility] stroke team.  They also spoke with Dr. Jernigan with urology regarding new bladder mass and hematuria, and Dr. Espinoza with cardiology regarding A-fib RVR.  Agreed to transfer to [facility] for higher level of care.    Assessment/Plan:    Gross hematuria  Bladder mass with hydro  Hx recent LAY and hydro with creatinine 9 and requiring bilateral nephrostomy tubes (at [facility] 1/20/2024 - 3/20/2024 hospitalization)  Acute UTI  Hx prostate cancer s/p radiation   -Urology consulted, hale placed and now on CBI  -Hold Heparin gtt. Trend H&H q6 hours   -Continue Rocephin  -Urine Cx pending  -Blood Cx pending  -IVF  -BMP in AM     Aphasia, resolved  Rule out CVA, TIA  --Seen by Tele-Neurologist Dr Mayfield at [facility]  -- Patient not a candidate  for any acute thrombolytic therapy or any acute thrombectomy as his NIH stroke scale is 0   --CTA head/neck negative  --Discussed with Stroke team. Orders placed, they will see patient in AM.  --MRI brain with and without contrast pending  --Echo with bubble pending    Hx of HTN now with hypotension  --Hold BP meds  --Patient had been on Diltiazem gtt for A fib RVR, now held  --S/P IVF bolus  --Continue maintenance fluids   --Monitor closely    A-fib RVR  Hx HTN/HLD/CAD  --Diltiazem gtt HELD for hypotension  --HR remains controlled currently  --Heparin gtt HELD due to gross hematuria (pt previously on eliquis but discontinued 4-5 months ago due to hematuria)   --Echo pending  --Cardiology to evaluate     Bilateral pneumonia  Recent Urine Legionella POSITIVE (6/11/2024)--data deficit  --Per CTA chest.  Possible viral  --carol ua, urine legionella, strep pneumo  --continue vancomycin and Zosyn.  --sputum culture, as needed nebs    Hx AAA  -- Reports had surgery several years ago.  -- CT A/P today BHR shows stable appearing infra renal abdominal aortic aneurysm with extension into the common iliac arteries.    Hx DM type II (A1c from 7/30/2024 was 9.2)  -- Reports now diet controlled, not on any meds  --LDSSI for now     COPD  Chronic tobacco use  --nicotine patch, assist/advise  --oxygen as needed     ETOH use  --reports one shot of whiskey daily   --CIWA, if starts scoring, will add PRN's   --monitor    DVT prophylaxis:  sequentials    Critical Care time spent in direct patient care:    75 minutes (excluding procedure time, if applicable) including high complexity decision making to assess and treat vital organ system failure in this individual who has impairment of one or more vital organ systems such that there is a high probability of imminent or life threatening deterioration in the patient’s condition and failure to initiate the above interventions on an urgent basis would likely result in sudden, clinically  significant or life threatening deterioration in the patient's condition.  21:44 EDT     CODE STATUS:    Level Of Support Discussed With: Patient  Code Status (Patient has no pulse and is not breathing): CPR (Attempt to Resuscitate)  Medical Interventions (Patient has pulse or is breathing): Full Support      Expected Discharge TBD   Expected Discharge Date: 8/8/2024; Expected Discharge Time:       This note has been completed as part of a split-shared workflow.     Signature: Electronically signed by MARCEL Benjamin, 08/04/24, 5:24 PM EDT        Attending   Admission Attestation       I have performed an independent face-to-face diagnostic evaluation including performing an independent physical examination.  I approve of the documented plan of care above that was reviewed and developed with the advanced practice clinician (APC) and take responsibility for that plan along with its associated risks.  I have updated the HPI as appropriate.    Brief HPI    Patient is a 73-year-old white male who was transferred from [facility] due to need for higher level of care.  Patient with past medical history of atrial fibrillation, previously on Eliquis but discontinued 4 to 5 months ago due to recurrent hematuria episodes, hypertension, hyperlipidemia, CAD, COPD, ongoing tobacco abuse, diet-controlled type 2 diabetes, history of prostate cancer status postradiation, history of right lung cancer, history of AAA repair.  Patient with recent extensive hospitalization at [facility] x 11 weeks due to acute kidney injury that required bilateral nephrostomy tubes that were eventually internalized due to hydronephrosis.  Patient was also treated for pneumonia at that time.  Patient presented to [facility] this morning due to persistent gross hematuria.  While patient was in the CT scanner he had an episode of aphasia.  Patient does not recall episode.  Patient was evaluated by  teleneurology.  CTA head and neck were obtained that were negative.  Due to gross hematuria, urology was contacted.  Cardiology was also contacted due to patient's A-fib RVR.  Arrived on heparin and diltiazem drips.  Current complaint is in his lower abdomen.    Attending Physical Exam:  Temp:  [97.2 °F (36.2 °C)-98.1 °F (36.7 °C)] 98.1 °F (36.7 °C)  Heart Rate:  [] 87  Resp:  [20-22] 20  BP: ()/(38-91) 90/60  Flow (L/min):  [2] 2    Constitutional: Awake, alert, NAD, chronically ill appearing   Eyes: PERRLA, sclerae anicteric, no conjunctival injection  HENT: NCAT, mucous membranes dry  Neck: Supple, no thyromegaly, no lymphadenopathy, trachea midline  Respiratory: Clear to auscultation bilaterally, nonlabored respirations   Cardiovascular: Irregular, no murmurs, rubs, or gallops, palpable pedal pulses bilaterally  Gastrointestinal: Positive bowel sounds, +firm lower abdomen/distended, +TTP but no R/R/G  Musculoskeletal: No bilateral ankle edema, no clubbing or cyanosis to extremities  Psychiatric: Appropriate affect, cooperative  Neurologic: Oriented x 3, strength symmetric in all extremities, Cranial Nerves grossly intact to confrontation, speech clear  Skin: No rashes     Result Review:  I have personally reviewed the results from the time of this admission to 8/4/2024 21:44 EDT and agree with these findings:  [x]  Laboratory list / accordion  []  Microbiology  [x]  Radiology  [x]  EKG/Telemetry   []  Cardiology/Vascular   []  Pathology  [x]  Old records  []  Other:      Assessment and Plan:  Admit to telemetry.  Hold heparin drip due to ongoing hematuria.  Hold diltiazem drip due to hypotension.  Stroke neurology to follow-up in the a.m.  MRI of the brain with and without contrast pending.  Urology following, patient now on CBI.  Cardiology to see in the a.m. regarding A-fib.  Patient remains a full code.    See assessment and plan documented by APC above and updated/edited by me as  appropriate.    Lashaun Boone, DO  08/04/24

## 2024-08-04 NOTE — CONSULTS
[facility]   Teleneurology Note    Patient Name: Stephane Noe  : 1950  MRN: [id number]  Primary Care Physician: Nain Morel MD  Referring Site: [facility]   Teleneurology Initial Data           Neurologist Evaluation Date: 24 Neurologist Evaluation Time: 1205   Date Last Known Well: 24 Time Last Known Well: 1156     History     Chief Complaint: Patient had come in for hematuria evaluation and went for a CAT scan for the belly and came out unable to verbally communicate or follow anything.  HPI: 73-year-old right-handed white male who had been on Eliquis for chronic atrial fibrillation had to be taken off the Eliquis after it was found that he was having hematuria and he was sent to the CAT scanner to get a CT of the abdomen and pelvis to make sure there is no neoplasm.  However the patient had a heart rate between 1 40-1 60 when he came in to the emergency room but before an intravenous Cardene drip could be started the patient had already gone for the CT scanner and came back after that aphasic and unable to comprehend anything.  Code stroke was called and when I saw the patient within 10 minutes and noticed that he was back to baseline as his blood sugar was low but the nurses have not given him any blood sugar medications as well.  Patient was able to tell me his name and his date of birth and where he is and able to repeat words and phrases without problems.  He is also able to read aloud common words and phrases and he can only identify 4 out of 6 pictures and was unable to comprehend the complex picture.  There is no drift noticeable in the upper or lower extremities and there is no sensory loss noticeable anywhere and no extinction on double simultaneous presentation of noxious stimulation bilaterally.  There is no visual impairment and no speech impediment and no facial asymmetry and he passed his bedside swallow.  The NIH stroke scale is 0.  It was decided is not  a candidate for any acute thrombolytic therapy.  Patient will be going for a CT angiogram of the head and neck with contrast along with CT perfusion to make sure there is no retrievable thrombus.  As the patient recently had hematuria the Eliquis will be discontinued and he will be placed on heparin drip without any bolus at any time.  He will be admitted to the intensive care unit setting.    Stroke Risk Factors/ Pertinent Data     Stroke risk factors: arrhythmia  Anticoagulants prior to arrival: none  Antiplatelets prior to arrival: none  Statins prior to arrival: none     Scoring Scales     Modified Krystle Scale  Pre-Stroke Modified [place] Scale: 0 - No Symptoms at all.  Intracerebral Hemmorhage (ICH) Score  [place] Coma Score: 13-15  Age>=80: no  [place] Coma Scale  Best Eye Response: Spontaneous  Best Verbal Response: Oriented  Best Motor Response: Follows commands  [place] Coma Scale Score: 15    NIH Stroke Scale     NIHSS Performed Date: 08/04/24 NIHSS Performed Time: 1205         Review of Systems     Review of Systems   Constitutional: Negative.    HENT: Negative.     Eyes: Negative.    Respiratory: Negative.     Cardiovascular:  Positive for palpitations.   Gastrointestinal: Negative.    Endocrine: Negative.    Genitourinary:  Positive for hematuria.   Musculoskeletal:  Positive for arthralgias.   Skin: Negative.    Allergic/Immunologic: Negative.    Neurological:  Positive for speech difficulty.   Hematological: Negative.    Psychiatric/Behavioral: Negative.       Patient had come in initially for hematuria evaluation as well as severe palpitations and was found to be in new onset atrial fibrillation with rapid ventricular response when he had gone for his CT scanner and then came back aphasic and unable to comprehend.  However when I evaluated the patient it seems he was back to baseline.  Objective   Exam     Exam performed with the help of support staff from the referring site  Neurological  Exam  Mental Status  Awake, alert and oriented to person, place and time. Oriented to person, place, time and situation. Recent and remote memory are intact. Speech is normal. Language is fluent with no aphasia. Attention and concentration are normal. Fund of knowledge is appropriate for level of education.    Cranial Nerves  CN I: Sense of smell is normal.  CN II: Visual acuity is normal. Visual fields full to confrontation.  CN III, IV, VI: Extraocular movements intact bilaterally. Normal lids and orbits bilaterally. Pupils equal round and reactive to light bilaterally.  CN V: Facial sensation is normal.  CN VII: Full and symmetric facial movement.  CN IX, X: Palate elevates symmetrically. Normal gag reflex.  CN XI: Shoulder shrug strength is normal.  CN XII: Tongue midline without atrophy or fasciculations.    Motor  Normal muscle bulk throughout. No fasciculations present. Normal muscle tone. No abnormal involuntary movements. Strength is 5/5 throughout all four extremities.    Sensory  Sensation is intact to light touch, pinprick, vibration and proprioception in all four extremities.    Coordination  Right: Finger-to-nose normal.Left: Finger-to-nose normal.  Had a hard time comprehending how to perform the heel-to-shin testing..    Gait    Could not be tested at this time as he is feeling weak and also he is exhibiting a heart rate between 1 30-1 40 with new onset atrial fibrillation..    Passed his bedside swallow  Result Review    Results   The CT of the chest showed the following:    FINDINGS: There is no axillary adenopathy. There is no hilar or  mediastinal adenopathy.  The heart is proper size. There is no  pericardial or pleural effusion. There is mild emphysematous change. No  filling defects are identified to suggest PE. There is minimal  dilatation of the ascending aorta up to 41 mm, stable from prior. Cannot  exclude dissection secondary to timing of the contrast bolus.. Limited  images of the upper  abdomen demonstrate partial visualization of the  gallbladder which appears normal. The residual mass/central right upper  lobe airspace disease is stable from the prior exam. Multiple air  bronchograms noted in this region, stable. There are new, faint,  peripheral groundglass opacities suggesting pneumonia, possibly viral.  Vascular calcifications noted. Streak artifact from patient's arm  position noted.     IMPRESSION:  No evidence of pulmonary embolism.      Suspected pneumonia, possibly viral.     The CT of the abdomen and pelvis with contrast showed the following:    FINDINGS:     ABDOMEN: The lung bases are clear. The heart is proper size. The liver  is homogenous with no focal abnormality. Gallbladder present with no CT  visible stones. The spleen is unremarkable. No adrenal mass is present.   The pancreas demonstrates no definite abnormality but sensitivity  significantly decreased from motion and streaking artifact. The kidneys  demonstrate bilateral hydronephrosis which is stable on the left despite  interval placement of a ureteral stent. Stent appears to be in good  position with the proximal pigtail in the left renal pelvis and the  distal pigtail extending through the focal bladder wall thickening/mass  into the right side of the bladder. Hydronephrosis on the right is  worsened from prior exam. Hydronephrosis appears to be secondary to the  enhancing nodular thickening of the posterior wall of the bladder; some  enhancing nodularity extends into the distal right ureter consistent  with mass and suspected cause of the hydronephrosis. Prostate is normal  in size with fiducial markers. There is small amount of presacral  fluid/infiltration that is similar to prior exam. There is moderate  bilateral perirenal stranding improved bilaterally. The aorta is again  noted to be ectatic and dilated with vascular calcifications and mural  thrombus/plaque. Maximum AP diameter is 48 mm, stable from  prior.  Aneurysm begins just distal to the renal arteries and extends to the  bifurcation with dilatation of the common iliac arteries as well.  Appearance is stable from prior. There is no free fluid or adenopathy.  Streak artifact from patient's arm position noted. There is motion  artifact on some images as well.     PELVIS: The GI tract demonstrates no obstruction.  The appendix is not  definitely identified but could be missed secondary to motion/streak  artifact. The urinary bladder is unremarkable. There is no free fluid,  adenopathy, or inflammatory process.     IMPRESSION:  Persistent, moderate left hydronephrosis despite ureteral  stent placement.     Worsened right moderate hydronephrosis which appears to be secondary to  a right posterior bladder wall mass with some extension of the enhancing  mass into the distal right ureter.     Stable appearance of the infrarenal abdominal aortic aneurysm with  extension into the common iliac arteries.    The CT angiogram of the head and neck with contrast showed the following:    FINDINGS:     Head CT: The ventricles are moderately enlarged indicating atrophy  appropriate for age. There is contrast in the transverse, straight and  sagittal sinuses from contrast exams performed less than 2 hours prior.  Some contrast persists in the basilar and vertebral arteries as well.  There is no evidence of hemorrhage. No masses are identified.  No  extra-axial fluid is seen. The sinuses are unremarkable.     CTA:     Aortic arch:  Arch shows no significant narrowing. Great vessel origins  are widely patent.  Lung apices demonstrate peripheral groundglass opacities suggesting  pneumonia, possibly viral. This was demonstrated on the CT chest. Mild  emphysematous change noted.  Right carotid: There is mild calcified plaque in the right common  carotid artery. In right bulb there is mild calcified plaque. This  causes less than 50% stenosis.     Left carotid: There is mild  calcified plaque in the left carotid bulb  causing less than 50% stenosis.     Vertebrals: The vertebrals are patent. No significant stenosis is  present. System is left vertebral dominant.     The cranial circulation is unremarkable. There is no significant  stenosis, aneurysm, or occlusion.     IMPRESSION:  Less than 50% stenosis bilateral internal carotid arteries.     Normal intracranial circulation.    Personal review of CNS imaging:(Official report by radiologist pending)  Imaging  CT Imaging Review: CT Imaging reviewed, NO acute infarct/ hemorrhage seen    Thrombolytic   Thrombolytics: not applicable     Assessment & Plan   Assessment/ Plan     Assessment:  Acute Stroke Evaluation: Suspected TIA or non disabling stroke- the risks of IV thrombolytic outweigh the benefits of treatment   Patient not a candidate for any acute thrombolytic therapy or any acute thrombectomy as his NIH stroke scale is 0.  It seems he has some cognitive function difficulty.    Plan:  The patient will be transferred to the [facility] because of the complexity of his current medical issues including possibly a bladder cancer as well as abdominal aortic aneurysms stretching into the bilateral internal iliac arteries.  Patient passed the bedside swallow but we will not be able to restart the Eliquis because of the persistent hematuria and so we will be starting him on heparin protocol without any bolus at any time with the neurological protocol.  He will be transferred to the [facility] at this point..  Transthoracic echocardiogram for tomorrow with a bubble study to look for any PFO.  MRI of the brain with a stroke protocol for tomorrow to look for any acute infarct in the left temporal area.  Lab work for tomorrow will include a lipid profile, TSH, sedimentation rate, vitamin B12 and folic acid levels.  As he passed the bedside swallow he will be on a cardiac diet.  Physical therapy,  Occupational Therapy and speech pathology to evaluate him regarding rehabilitation potential.  Cardiology consultation is necessary to make sure that his heart rate stays below 100.  He will be evaluated by the stroke neurology team at the [facility] when he gets there.         Disposition     Disposition: The patient will be transferred to the [facility] for further evaluation for the bladder mass with the help of urology.  Medical Decision Making  Medical Data Reviewed: Data reviewed including: clinical labs, radiology and/or medical tests, Obtaining/ reviewing old medical records, Obtaining case history from another source, Independent review of CNS images  Length of visit: 30 minutes in this critical care evaluation and management of the acute strokelike symptoms in the emergency room setting    Michell ANDERSON MD, saw the patient on 08/04/24 at 1205 for an initial in-patient or emergency room telememedicine face to face consult using interactive technology for 30 minutes. The location of the patient was [place]. I was located at [facility].    I have proceeded with this evaluation at the request of the referring practitioner as it is felt to be an emergency setting and no appropriate specialist is available to perform this evaluation. The originating hospital has reported that this is the correct patient and has obtained consent from the patient/surrogate to perform this telemedicine evaluation(including obtaining history, performing examination and reviewing data provided by the patient an/or originating site of care provider)    I have introduced myself to the patient, provided my credentials, disclosed my location, and determined that, based on review of the patient's chart and discussion with the patient's primary team, telemedicine via a HIPAA compliant, real-time, face-to-face two-way, interactive audio and video platform is an appropriate and effective means  of providing the service.    The patient/surrogate has a right to refuse this evaluation as they have been explained risks including potential loss of confidentiality, benefits, alternatives, and the potential need for subsequent face-to-face care. In this evaluation, we will be providing recommendations only.  The ultimate decision to follow or not to follow these recommendations will be left to the bedside treating/requesting practitioner.    The patient/surrogate has been notified that other healthcare professionals including technical person may be involved in this A/V evaluation.  All laws concerning confidentiality and patient access to medical records and copies of medical records apply to telemedicine.  The patient/surrogate has received the originating site's Health Notice of Privacy Practices.    Michell Mayfield MD

## 2024-08-05 ENCOUNTER — APPOINTMENT (OUTPATIENT)
Dept: CARDIOLOGY | Facility: HOSPITAL | Age: 74
DRG: 668 | End: 2024-08-05
Payer: MEDICARE

## 2024-08-05 LAB
ANION GAP SERPL CALCULATED.3IONS-SCNC: 10 MMOL/L (ref 5–15)
BACTERIA SPEC AEROBE CULT: NORMAL
BASOPHILS # BLD AUTO: 0.02 10*3/MM3 (ref 0–0.2)
BASOPHILS NFR BLD AUTO: 0.3 % (ref 0–1.5)
BUN SERPL-MCNC: 16 MG/DL (ref 8–23)
BUN/CREAT SERPL: 10.7 (ref 7–25)
CALCIUM SPEC-SCNC: 8.3 MG/DL (ref 8.6–10.5)
CHLORIDE SERPL-SCNC: 107 MMOL/L (ref 98–107)
CHOLEST SERPL-MCNC: 117 MG/DL (ref 0–200)
CO2 SERPL-SCNC: 20 MMOL/L (ref 22–29)
CREAT SERPL-MCNC: 1.5 MG/DL (ref 0.76–1.27)
DEPRECATED RDW RBC AUTO: 72 FL (ref 37–54)
EGFRCR SERPLBLD CKD-EPI 2021: 48.6 ML/MIN/1.73
EOSINOPHIL # BLD AUTO: 0.04 10*3/MM3 (ref 0–0.4)
EOSINOPHIL NFR BLD AUTO: 0.5 % (ref 0.3–6.2)
ERYTHROCYTE [DISTWIDTH] IN BLOOD BY AUTOMATED COUNT: 19.2 % (ref 12.3–15.4)
ERYTHROCYTE [SEDIMENTATION RATE] IN BLOOD: 40 MM/HR (ref 0–20)
FOLATE SERPL-MCNC: 8.3 NG/ML (ref 4.78–24.2)
GLUCOSE BLDC GLUCOMTR-MCNC: 123 MG/DL (ref 70–130)
GLUCOSE BLDC GLUCOMTR-MCNC: 139 MG/DL (ref 70–130)
GLUCOSE BLDC GLUCOMTR-MCNC: 145 MG/DL (ref 70–130)
GLUCOSE BLDC GLUCOMTR-MCNC: 155 MG/DL (ref 70–130)
GLUCOSE SERPL-MCNC: 113 MG/DL (ref 65–99)
HCT VFR BLD AUTO: 27 % (ref 37.5–51)
HDLC SERPL-MCNC: 42 MG/DL (ref 40–60)
HGB BLD-MCNC: 8.5 G/DL (ref 13–17.7)
IMM GRANULOCYTES # BLD AUTO: 0.04 10*3/MM3 (ref 0–0.05)
IMM GRANULOCYTES NFR BLD AUTO: 0.5 % (ref 0–0.5)
LDLC SERPL CALC-MCNC: 57 MG/DL (ref 0–100)
LDLC/HDLC SERPL: 1.33 {RATIO}
LYMPHOCYTES # BLD AUTO: 0.78 10*3/MM3 (ref 0.7–3.1)
LYMPHOCYTES NFR BLD AUTO: 10 % (ref 19.6–45.3)
MCH RBC QN AUTO: 32.9 PG (ref 26.6–33)
MCHC RBC AUTO-ENTMCNC: 31.5 G/DL (ref 31.5–35.7)
MCV RBC AUTO: 104.7 FL (ref 79–97)
MONOCYTES # BLD AUTO: 0.58 10*3/MM3 (ref 0.1–0.9)
MONOCYTES NFR BLD AUTO: 7.4 % (ref 5–12)
NEUTROPHILS NFR BLD AUTO: 6.37 10*3/MM3 (ref 1.7–7)
NEUTROPHILS NFR BLD AUTO: 81.3 % (ref 42.7–76)
NRBC BLD AUTO-RTO: 0 /100 WBC (ref 0–0.2)
PLATELET # BLD AUTO: 159 10*3/MM3 (ref 140–450)
PMV BLD AUTO: 9.7 FL (ref 6–12)
POTASSIUM SERPL-SCNC: 4.5 MMOL/L (ref 3.5–5.2)
PSA SERPL-MCNC: 0.04 NG/ML (ref 0–4)
RBC # BLD AUTO: 2.58 10*6/MM3 (ref 4.14–5.8)
SODIUM SERPL-SCNC: 137 MMOL/L (ref 136–145)
TRIGL SERPL-MCNC: 96 MG/DL (ref 0–150)
TSH SERPL DL<=0.05 MIU/L-ACNC: 11.53 UIU/ML (ref 0.27–4.2)
VIT B12 BLD-MCNC: 602 PG/ML (ref 211–946)
VLDLC SERPL-MCNC: 18 MG/DL (ref 5–40)
WBC NRBC COR # BLD AUTO: 7.83 10*3/MM3 (ref 3.4–10.8)

## 2024-08-05 PROCEDURE — 85025 COMPLETE CBC W/AUTO DIFF WBC: CPT | Performed by: NURSE PRACTITIONER

## 2024-08-05 PROCEDURE — 80061 LIPID PANEL: CPT | Performed by: NURSE PRACTITIONER

## 2024-08-05 PROCEDURE — 82607 VITAMIN B-12: CPT | Performed by: NURSE PRACTITIONER

## 2024-08-05 PROCEDURE — 97161 PT EVAL LOW COMPLEX 20 MIN: CPT

## 2024-08-05 PROCEDURE — 82746 ASSAY OF FOLIC ACID SERUM: CPT | Performed by: NURSE PRACTITIONER

## 2024-08-05 PROCEDURE — 84443 ASSAY THYROID STIM HORMONE: CPT | Performed by: NURSE PRACTITIONER

## 2024-08-05 PROCEDURE — 99222 1ST HOSP IP/OBS MODERATE 55: CPT | Performed by: STUDENT IN AN ORGANIZED HEALTH CARE EDUCATION/TRAINING PROGRAM

## 2024-08-05 PROCEDURE — 80048 BASIC METABOLIC PNL TOTAL CA: CPT | Performed by: NURSE PRACTITIONER

## 2024-08-05 PROCEDURE — 93306 TTE W/DOPPLER COMPLETE: CPT

## 2024-08-05 PROCEDURE — 93306 TTE W/DOPPLER COMPLETE: CPT | Performed by: INTERNAL MEDICINE

## 2024-08-05 PROCEDURE — 97165 OT EVAL LOW COMPLEX 30 MIN: CPT

## 2024-08-05 PROCEDURE — 82948 REAGENT STRIP/BLOOD GLUCOSE: CPT

## 2024-08-05 PROCEDURE — 85652 RBC SED RATE AUTOMATED: CPT | Performed by: NURSE PRACTITIONER

## 2024-08-05 PROCEDURE — 25010000002 PIPERACILLIN SOD-TAZOBACTAM PER 1 G: Performed by: PEDIATRICS

## 2024-08-05 PROCEDURE — 99232 SBSQ HOSP IP/OBS MODERATE 35: CPT | Performed by: PEDIATRICS

## 2024-08-05 PROCEDURE — 92523 SPEECH SOUND LANG COMPREHEN: CPT

## 2024-08-05 RX ORDER — ASPIRIN 81 MG/1
81 TABLET ORAL DAILY
Status: DISCONTINUED | OUTPATIENT
Start: 2024-08-05 | End: 2024-08-11 | Stop reason: HOSPADM

## 2024-08-05 RX ORDER — AZITHROMYCIN 250 MG/1
500 TABLET, FILM COATED ORAL
Status: DISCONTINUED | OUTPATIENT
Start: 2024-08-05 | End: 2024-08-05

## 2024-08-05 RX ORDER — OXYBUTYNIN CHLORIDE 5 MG/1
2.5 TABLET ORAL ONCE
Status: COMPLETED | OUTPATIENT
Start: 2024-08-05 | End: 2024-08-06

## 2024-08-05 RX ADMIN — LEVOTHYROXINE SODIUM 50 MCG: 0.05 TABLET ORAL at 05:55

## 2024-08-05 RX ADMIN — ASPIRIN 81 MG: 81 TABLET, COATED ORAL at 17:21

## 2024-08-05 RX ADMIN — PIPERACILLIN AND TAZOBACTAM 3.38 G: 3; .375 INJECTION, POWDER, FOR SOLUTION INTRAVENOUS at 10:00

## 2024-08-05 RX ADMIN — PANTOPRAZOLE SODIUM 40 MG: 40 TABLET, DELAYED RELEASE ORAL at 05:55

## 2024-08-05 RX ADMIN — AZITHROMYCIN DIHYDRATE 500 MG: 250 TABLET ORAL at 10:01

## 2024-08-05 RX ADMIN — ATORVASTATIN CALCIUM 80 MG: 40 TABLET, FILM COATED ORAL at 20:14

## 2024-08-05 RX ADMIN — CYANOCOBALAMIN TAB 1000 MCG 1000 MCG: 1000 TAB at 08:26

## 2024-08-05 RX ADMIN — Medication 10 ML: at 20:14

## 2024-08-05 RX ADMIN — Medication 10 ML: at 10:01

## 2024-08-05 NOTE — NURSING NOTE
Pt reporting pain/pressure to bladder area 9/10 pain. Minimal output noted to hale bag with CBI running.    CBI stopped. Hand irrigated with 60ml syringe by RN, multiple large clots removed with syringe. Hale bag re-attached and UOP now adequate, CBI restarted. Pt reports much relief. Urine pink and clear at this time. Will continue to monitor.
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
## 2024-08-05 NOTE — PLAN OF CARE
Goal Outcome Evaluation:  Plan of Care Reviewed With: patient           Outcome Evaluation: Pt presents likely near recent functional baseline with minor strength, balance, and endurance deficits. Pt will benefit from PT to address these deficits. Rec home with OP PT upon dc.      Anticipated Discharge Disposition (PT): home with outpatient therapy services

## 2024-08-05 NOTE — PLAN OF CARE
Goal Outcome Evaluation:  Plan of Care Reviewed With: patient        Progress: no change  Outcome Evaluation: Pt. presents below baseline with ADLs and functional mobility. Limited by decreased activity tolerance and mild balance impairments. Skilled OT services warranted to promote return to PLOF. Recommend home with assist at discharge.      Anticipated Discharge Disposition (OT): home with assist

## 2024-08-05 NOTE — CASE MANAGEMENT/SOCIAL WORK
Continued Stay Note  [facility]     Patient Name: Stephane Noe  MRN: [id number]  Today's Date: 8/5/2024    Admit Date: 8/4/2024    Plan: IDP   Discharge Plan       Row Name 08/05/24 1540       Plan    Plan IDP    Plan Comments CM attempted to speak with Mr. Noe at the bedside x 3 to initiate discharge planning. Patient was unavailable each time. CM will follow up tomorrow to initiate discharge plan.    Final Discharge Disposition Code 01 - home or self-care                   Discharge Codes    No documentation.                 Expected Discharge Date and Time       Expected Discharge Date Expected Discharge Time    Aug 8, 2024               Krista Desai RN

## 2024-08-05 NOTE — NURSING NOTE
Pt hand irrigated with syringe and hale cath a total of 5 times throughout day shift, pulling out clots each time. CBI still running moderately fast at this time to keep urine pink and clear. Pt tolerating well at this time.

## 2024-08-05 NOTE — CONSULTS
Diabetes Education    Patient Name:  Stephane Noe  YOB: 1950  MRN: [id number]  Admit Date:  8/4/2024    Order criteria not met for diabetes education consult. Current A1c is 5.2, noted during chart review. Pt has no home meds for DM, . Patient does not qualify for the follow up stroke class based on the exclusion criteria of MRI negative for acute infarct, BMI <21. Thank you.        Electronically signed by:  Lara Hill RN  08/05/24 10:40 EDT

## 2024-08-05 NOTE — PROGRESS NOTES
[facility] Medicine Services  PROGRESS NOTE    Patient Name: Stephane Noe  : 1950  MRN: [id number]    Date of Admission: 2024  Primary Care Physician: Nain Morel MD    Subjective   Subjective     CC:  Hematuria    HPI:  Patient overall is feeling better.  The bleeding seems to be improving as well.  Denies any palpitations.  Denies any shortness of breath, no cough, no chest pain.  Denies any diarrhea as well.    Pt was admitted to [facility] in  and was found to be + for Legionella at that time.  Treated with 5 days of azithro.    Objective   Objective     Vital Signs:   Temp:  [98 °F (36.7 °C)-98.5 °F (36.9 °C)] 98.5 °F (36.9 °C)  Heart Rate:  [] 103  Resp:  [16-22] 16  BP: ()/(44-83) 101/68  Flow (L/min):  [2] 2     Physical Exam:  Constitutional: No acute distress, awake, alert  HENT: NCAT, mucous membranes moist  Respiratory: Clear to auscultation bilaterally, respiratory effort normal   Cardiovascular: Tachycardia, irregular rate and rhythm, no murmurs, rubs, or gallops  Gastrointestinal: Positive bowel sounds, soft, nontender, nondistended, [place] in place with hematuria present  Musculoskeletal: No bilateral ankle edema  Psychiatric: Appropriate affect, cooperative  Neurologic: Oriented x 3, strength symmetric in all extremities, Cranial Nerves grossly intact to confrontation, speech clear  Skin: No rashes      Results Reviewed:  LAB RESULTS:      Lab 24  1126 24  1647 24  1341 24  1250 24  0950 24  1549   WBC 7.83 8.18  --   --  8.35 7.9   HEMOGLOBIN 8.5* 9.5*  --   --  10.5* 9.2*   HEMATOCRIT 27.0* 30.2*  --   --  32.6* 29.0*   PLATELETS 159 197  --   --  223 237   NEUTROS ABS 6.37 6.56  --   --  5.70 6.0   IMMATURE GRANS (ABS) 0.04 0.06*  --   --  0.08*  --    LYMPHS ABS 0.78 0.86  --   --  1.76 1.3   MONOS ABS 0.58 0.64  --   --  0.66 0.4   EOS ABS 0.04 0.03  --   --  0.10 0.1   .7* 102.4*  --   --   102.2* 102*   SED RATE 40*  --   --   --   --   --    PROCALCITONIN  --   --   --   --  0.23  --    LACTATE  --  1.7  --  3.5*  --   --    PROTIME  --   --   --   --  13.3  --    APTT  --   --   --   --  31.3*  --    HEPARIN ANTI-XA  --   --  0.10*  --   --   --          Lab 08/05/24  1126 08/04/24  1647 08/04/24  0950 07/30/24  1549   SODIUM 137 134* 134* 135   POTASSIUM 4.5 4.8 4.6 4.5   CHLORIDE 107 104 102 101   CO2 20.0* 19.0* 18.5* 19*   ANION GAP 10.0 11.0 13.5  --    BUN 16 19 19 20   CREATININE 1.50* 1.38* 1.48* 1.21   EGFR 48.6* 53.7* 49.3*  --    GLUCOSE 113* 82 85 98   CALCIUM 8.3* 8.2* 9.5 9.1   MAGNESIUM  --   --  1.8  --    HEMOGLOBIN A1C  --   --   --  5.2   TSH 11.530*  --   --  25.200*         Lab 08/04/24  1647 08/04/24  0950 07/30/24  1549   TOTAL PROTEIN 5.5* 6.9  --    ALBUMIN 2.6* 3.4* 3.1*   GLOBULIN 2.9 3.5  --    ALT (SGPT) <5 5 5   AST (SGOT) 11 12 9   BILIRUBIN 0.4 0.2 0.3   ALK PHOS 75 97 95         Lab 08/04/24  1647 08/04/24  1211 08/04/24  0950   HSTROP T 87* 95* 91*   PROTIME  --   --  13.3   INR  --   --  0.97         Lab 08/05/24  1126   CHOLESTEROL 117   LDL CHOL 57   HDL CHOL 42   TRIGLYCERIDES 96         Lab 08/05/24  1126 08/04/24  1258 08/04/24  0950 07/30/24  1549   IRON SATURATION  --   --   --  20   TIBC  --   --   --  180*   FERRITIN  --   --   --  287   UIBC  --   --   --  144   FOLATE 8.30  --   --  11.3   VITAMIN B 12 602  --   --  717   ABO TYPING  --  A   < >  --    RH TYPING  --  Positive   < >  --    ANTIBODY SCREEN  --  Negative  --   --     < > = values in this interval not displayed.         Brief Urine Lab Results  (Last result in the past 365 days)        Color   Clarity   Blood   Leuk Est   Nitrite   Protein   CREAT   Urine HCG        08/04/24 1717 Red   Turbid   Large (3+)   Moderate (2+)   Positive   >=300 mg/dL (3+)                   Microbiology Results Abnormal       Procedure Component Value - Date/Time    Urine Culture - Urine, Urine, Clean Catch  [[id number]]  (Normal) Collected: 08/04/24 1717    Lab Status: Preliminary result Specimen: Urine, Clean Catch Updated: 08/05/24 0951     Urine Culture No growth    S. Pneumo Ag Urine or CSF - Urine, Urine, Clean Catch [[id number]]  (Normal) Collected: 08/04/24 1717    Lab Status: Final result Specimen: Urine, Clean Catch Updated: 08/04/24 2155     Strep Pneumo Ag Negative    MRSA Screen, PCR (Inpatient) - Swab, Nares [[id number]]  (Normal) Collected: 08/04/24 1820    Lab Status: Final result Specimen: Swab from Nares Updated: 08/04/24 1935     MRSA PCR Negative    Narrative:      The negative predictive value of this diagnostic test is high and should only be used to consider de-escalating anti-MRSA therapy. A positive result may indicate colonization with MRSA and must be correlated clinically.  MRSA Negative    Respiratory Panel PCR w/COVID-19(SARS-CoV-2) MORALES/DUNG/ERWIN/PAD/COR/ANDIE In-House, NP Swab in UTM/VTM, 2 HR TAT - Swab, Nasopharynx [[id number]]  (Normal) Collected: 08/04/24 1716    Lab Status: Final result Specimen: Swab from Nasopharynx Updated: 08/04/24 1804     ADENOVIRUS, PCR Not Detected     Coronavirus 229E Not Detected     Coronavirus HKU1 Not Detected     Coronavirus NL63 Not Detected     Coronavirus OC43 Not Detected     COVID19 Not Detected     Human Metapneumovirus Not Detected     Human Rhinovirus/Enterovirus Not Detected     Influenza A PCR Not Detected     Influenza B PCR Not Detected     Parainfluenza Virus 1 Not Detected     Parainfluenza Virus 2 Not Detected     Parainfluenza Virus 3 Not Detected     Parainfluenza Virus 4 Not Detected     RSV, PCR Not Detected     Bordetella pertussis pcr Not Detected     Bordetella parapertussis PCR Not Detected     Chlamydophila pneumoniae PCR Not Detected     Mycoplasma pneumo by PCR Not Detected    Narrative:      In the setting of a positive respiratory panel with a viral infection PLUS a negative procalcitonin without other underlying concern for bacterial  infection, consider observing off antibiotics or discontinuation of antibiotics and continue supportive care. If the respiratory panel is positive for atypical bacterial infection (Bordetella pertussis, Chlamydophila pneumoniae, or Mycoplasma pneumoniae), consider antibiotic de-escalation to target atypical bacterial infection.            MRI Brain With & Without Contrast    Result Date: 8/5/2024  MRI BRAIN W WO CONTRAST Date of Exam: 8/4/2024 10:32 PM EDT Indication: Stroke, follow up stroke workup, possible bladder CA.  Comparison: 12/12/2019 Technique:  Routine multiplanar/multisequence sequence images of the brain were obtained before and after the uneventful administration of 10 cc Multihance. Findings: There is no diffusion restriction to suggest acute infarct. There is no evidence of acute or chronic intracranial hemorrhage. No mass effect or midline shift. There is generalized cerebral atrophy. No abnormal extra-axial collections. Minimal progression of mild to moderate nonspecific white matter signal abnormality without mass effect nor enhancement suggestive of chronic microvascular ischemic disease. Symmetric nonspecific decreasing signal intensity within the basal ganglia which  may be senescent in etiology. Midline structures are intact. No significant cortical abnormality is identified. Calvarial and superficial soft tissue signal is within normal limits. Orbits appear unremarkable. The paranasal sinuses and the mastoid air cells appear well aerated. There is no abnormal intracranial enhancement. There is normal enhancement within the intracranial vasculature including the dural venous sinuses.     Impression: Impression: 1.No evidence of acute intracranial process nor metastatic disease. 2.Minimal progression of nonspecific white matter changes likely related to microvascular ischemic disease. 3.Symmetric nonspecific decreased signal intensity within the basal ganglia which can be senescent in nature.  Electronically Signed: Steve Peace MD  8/5/2024 10:30 AM EDT  Workstation ID: [id number]    CT Angiogram Head    Result Date: 8/4/2024  PROCEDURE: CT ANGIOGRAM HEAD-, CT ANGIOGRAM NECK-  HISTORY: acute onset aphasia; R31.9-Hematuria, unspecified; I48.91-Unspecified atrial fibrillation; J18.9-Pneumonia, unspecified organism; R47.01-Aphasia; N39.0-Urinary tract infection, site not specified; R31.9-Hematuria, unspecified  TECHNIQUE: Thin section axial CT with IV contrast supplemented with multiplanar 3 D reconstructions of the head and neck. This study was performed with techniques to keep radiation doses as low as reasonably achievable, (ALARA). Individualized dose reduction techniques using automated exposure control or adjustment of mA and/or kV according to the patient size were employed.  FINDINGS:  Head CT: The ventricles are moderately enlarged indicating atrophy appropriate for age. There is contrast in the transverse, straight and sagittal sinuses from contrast exams performed less than 2 hours prior. Some contrast persists in the basilar and vertebral arteries as well. There is no evidence of hemorrhage. No masses are identified.  No extra-axial fluid is seen. The sinuses are unremarkable.  CTA:  Aortic arch:  Arch shows no significant narrowing. Great vessel origins are widely patent. Lung apices demonstrate peripheral groundglass opacities suggesting pneumonia, possibly viral. This was demonstrated on the CT chest. Mild emphysematous change noted. Right carotid: There is mild calcified plaque in the right common carotid artery. In right bulb there is mild calcified plaque. This causes less than 50% stenosis.  Left carotid: There is mild calcified plaque in the left carotid bulb causing less than 50% stenosis.  Vertebrals: The vertebrals are patent. No significant stenosis is present. System is left vertebral dominant.  The cranial circulation is unremarkable. There is no significant stenosis, aneurysm, or  occlusion.      Impression: Less than 50% stenosis bilateral internal carotid arteries.  Normal intracranial circulation.  Peripheral bilateral pneumonia, possibly viral.    CTDI: 32.17 mGy DLP:601.33 mGy.cm   This report was signed and finalized on 8/4/2024 1:50 PM by Rebecca Godwin MD.      CT Angiogram Neck    Result Date: 8/4/2024  PROCEDURE: CT ANGIOGRAM HEAD-, CT ANGIOGRAM NECK-  HISTORY: acute onset aphasia; R31.9-Hematuria, unspecified; I48.91-Unspecified atrial fibrillation; J18.9-Pneumonia, unspecified organism; R47.01-Aphasia; N39.0-Urinary tract infection, site not specified; R31.9-Hematuria, unspecified  TECHNIQUE: Thin section axial CT with IV contrast supplemented with multiplanar 3 D reconstructions of the head and neck. This study was performed with techniques to keep radiation doses as low as reasonably achievable, (ALARA). Individualized dose reduction techniques using automated exposure control or adjustment of mA and/or kV according to the patient size were employed.  FINDINGS:  Head CT: The ventricles are moderately enlarged indicating atrophy appropriate for age. There is contrast in the transverse, straight and sagittal sinuses from contrast exams performed less than 2 hours prior. Some contrast persists in the basilar and vertebral arteries as well. There is no evidence of hemorrhage. No masses are identified.  No extra-axial fluid is seen. The sinuses are unremarkable.  CTA:  Aortic arch:  Arch shows no significant narrowing. Great vessel origins are widely patent. Lung apices demonstrate peripheral groundglass opacities suggesting pneumonia, possibly viral. This was demonstrated on the CT chest. Mild emphysematous change noted. Right carotid: There is mild calcified plaque in the right common carotid artery. In right bulb there is mild calcified plaque. This causes less than 50% stenosis.  Left carotid: There is mild calcified plaque in the left carotid bulb causing less than 50% stenosis.   Vertebrals: The vertebrals are patent. No significant stenosis is present. System is left vertebral dominant.  The cranial circulation is unremarkable. There is no significant stenosis, aneurysm, or occlusion.      Impression: Less than 50% stenosis bilateral internal carotid arteries.  Normal intracranial circulation.  Peripheral bilateral pneumonia, possibly viral.    CTDI: 32.17 mGy DLP:601.33 mGy.cm   This report was signed and finalized on 8/4/2024 1:50 PM by Rebecca Godwin MD.      CT Abdomen Pelvis With Contrast    Result Date: 8/4/2024  PROCEDURE: CT ABDOMEN PELVIS W CONTRAST-  HISTORY: recent nephrostomy, hematuria, difficulty urinating rule out stone  COMPARISON: 06/11/2024.  PROCEDURE: The patient was injected with IV contrast. Oral contrast was administered. Axial images were obtained from the lung bases to the pubic symphysis by computed tomography. This study was performed with techniques to keep radiation doses as low as reasonably achievable, (ALARA). Individualized dose reduction techniques using automated exposure control or adjustment of mA and/or kV according to the patient size were employed.  FINDINGS:  ABDOMEN: The lung bases are clear. The heart is proper size. The liver is homogenous with no focal abnormality. Gallbladder present with no CT visible stones. The spleen is unremarkable. No adrenal mass is present. The pancreas demonstrates no definite abnormality but sensitivity significantly decreased from motion and streaking artifact. The kidneys demonstrate bilateral hydronephrosis which is stable on the left despite interval placement of a ureteral stent. Stent appears to be in good position with the proximal pigtail in the left renal pelvis and the distal pigtail extending through the focal bladder wall thickening/mass into the right side of the bladder. Hydronephrosis on the right is worsened from prior exam. Hydronephrosis appears to be secondary to the enhancing nodular thickening of the  posterior wall of the bladder; some enhancing nodularity extends into the distal right ureter consistent with mass and suspected cause of the hydronephrosis. Prostate is normal in size with fiducial markers. There is small amount of presacral fluid/infiltration that is similar to prior exam. There is moderate bilateral perirenal stranding improved bilaterally. The aorta is again noted to be ectatic and dilated with vascular calcifications and mural thrombus/plaque. Maximum AP diameter is 48 mm, stable from prior. Aneurysm begins just distal to the renal arteries and extends to the bifurcation with dilatation of the common iliac arteries as well. Appearance is stable from prior. There is no free fluid or adenopathy. Streak artifact from patient's arm position noted. There is motion artifact on some images as well.  PELVIS: The GI tract demonstrates no obstruction.  The appendix is not definitely identified but could be missed secondary to motion/streak artifact. The urinary bladder is unremarkable. There is no free fluid, adenopathy, or inflammatory process.      Impression: Persistent, moderate left hydronephrosis despite ureteral stent placement.  Worsened right moderate hydronephrosis which appears to be secondary to a right posterior bladder wall mass with some extension of the enhancing mass into the distal right ureter.  Stable appearance of the infrarenal abdominal aortic aneurysm with extension into the common iliac arteries.  CTDI: 8.21 mGy DLP:737.75 mGy.cm  This report was signed and finalized on 8/4/2024 1:07 PM by Rebecca Godwin MD.      CT Angiogram Chest Pulmonary Embolism    Result Date: 8/4/2024  PROCEDURE: CT ANGIOGRAM CHEST PULMONARY EMBOLISM-  HISTORY: tachycardia, rule out PE not on anticoagulation hx of afib, history of stage IIIa adenocarcinoma of the right lung.  COMPARISON: June 11, 2024.  TECHNIQUE: The patient was injected with  IV contrast. Axial images were obtained through the chest in a  CTA/ PE protocol. 3 D Reconstruction images were also performed. This study was performed with techniques to keep radiation doses as low as reasonably achievable, (ALARA). Individualized dose reduction techniques using automated exposure control or adjustment of mA and/or kV according to the patient size were employed.  FINDINGS: There is no axillary adenopathy. There is no hilar or mediastinal adenopathy.  The heart is proper size. There is no pericardial or pleural effusion. There is mild emphysematous change. No filling defects are identified to suggest PE. There is minimal dilatation of the ascending aorta up to 41 mm, stable from prior. Cannot exclude dissection secondary to timing of the contrast bolus.. Limited images of the upper abdomen demonstrate partial visualization of the gallbladder which appears normal. The residual mass/central right upper lobe airspace disease is stable from the prior exam. Multiple air bronchograms noted in this region, stable. There are new, faint, peripheral groundglass opacities suggesting pneumonia, possibly viral. Vascular calcifications noted. Streak artifact from patient's arm position noted.      Impression: No evidence of pulmonary embolism.  Suspected pneumonia, possibly viral.   CTDI: 8.21 mGy DLP:737.75 mGy.cm  This report was signed and finalized on 8/4/2024 12:11 PM by Rebecca Godwin MD.      XR Chest 1 View    Result Date: 8/4/2024  PROCEDURE: XR CHEST 1 VW-  HISTORY: Weak/Dizzy/AMS triage protocol, penile bleeding for 4 to 5 months.  COMPARISON: June 11, 2024.  FINDINGS: The heart is normal in size. Left lung is clear. There is right perihilar mass/airspace disease and linear densities which are stable from prior exam.. The mediastinum is unremarkable. There is no pneumothorax.  There are no acute osseous abnormalities. Tortuosity of the thoracic aorta noted. Apical lordotic positioning noted. Aortic mural calcifications noted.      Impression: Stable chest..      This report was signed and finalized on 8/4/2024 10:15 AM by Rebecca Godwin MD.           Current medications:  Scheduled Meds:atorvastatin, 80 mg, Oral, Nightly  azithromycin, 500 mg, Oral, Q24H  insulin lispro, 2-7 Units, Subcutaneous, 4x Daily AC & at Bedtime  levothyroxine, 50 mcg, Oral, Q AM  nicotine, 1 patch, Transdermal, Q24H  pantoprazole, 40 mg, Oral, Q AM  piperacillin-tazobactam, 3.375 g, Intravenous, Q8H  sodium chloride, 10 mL, Intravenous, Q12H  vitamin B-12, 1,000 mcg, Oral, Daily      Continuous Infusions:[Held by provider] dilTIAZem, 5-15 mg/hr, Last Rate: Stopped (08/04/24 1730)  [Held by provider] heparin, 12 Units/kg/hr, Last Rate: Stopped (08/04/24 1845)  Pharmacy to Dose Heparin,       PRN Meds:.  acetaminophen **OR** acetaminophen **OR** acetaminophen    senna-docusate sodium **AND** polyethylene glycol **AND** bisacodyl **AND** bisacodyl    dextrose    dextrose    glucagon (human recombinant)    HYDROcodone-acetaminophen    ipratropium-albuterol    Morphine **AND** naloxone    nicotine polacrilex    Pharmacy to Dose Heparin    sodium chloride    sodium chloride    Assessment & Plan   Assessment & Plan     Active Hospital Problems    Diagnosis  POA    **Gross hematuria [R31.0]  Yes    Transient ischemic attack (TIA) [G45.9]  Yes    Bladder mass [N32.89]  Yes    Acute UTI (urinary tract infection) [N39.0]  Yes    Bilateral pneumonia [J18.9]  Yes    Abdominal aortic aneurysm (AAA) without rupture [I71.40]  Yes    History of oropharyngeal cancer [Z85.819]  Yes    History of lung cancer [Z85.118]  Not Applicable    History of prostate cancer [Z85.46]  Not Applicable    Physical deconditioning [R53.81]  Yes    History of laminectomy [Z98.890]  Not Applicable    Anemia [D64.9]  Yes    Cigarette smoker [F17.210]  Yes    Primary hypertension [I10]  Yes    Type 2 diabetes mellitus with diabetic polyneuropathy, without long-term current use of insulin [E11.42]  Yes    Atrial fibrillation with rapid  ventricular response [I48.91]  Yes      Resolved Hospital Problems   No resolved problems to display.        Brief Hospital Course to date:  Stephane Noe is a 73 y.o. male with past medical history significant for atrial fib (prior on Eliquis but discontinued 4-5 months ago due to recurrent hematuria episodes), HTN, HLD, CAD, COPD and ongoing tobacco use, DM type II (diet-controlled), prostate cancer, right lung cancer s/p XRT (data deficit), AAA repair, recent extensive hospitalization at [facility] x 11 weeks due to LAY, required bilateral nephrostomy tubes for hydro and pneumonia.  Patient also with recent back surgery in March 2024 (data deficit).  With intermittent mild hematuria episodes since stopping Eliquis earlier this year.  Developed gross hematuria again yesterday 8/3 that remained persistent and presents to [facility] ER for evaluation this morning.     On ER eval noted to be anemic with hemoglobin of 10.5, creatinine 1.14, elevated troponins of 91/95 with delta of 4, lactate 3.5, UA positive for UTI and gross hematuria.  CT of the abdomen revealed a stable left hydro and a worsening right hydro secondary to a bladder mass.  Post CT of the abdomen patient developed aphasia and code stroke was called.  CTs completed.  CT head and neck with normal intracranial circulation less than 50% bilateral ICA stenosis.  CTA chest with no PE but bilateral pneumonia, possibly viral noted.  Also found to be in A-fib RVR with heart rate of 161 and hypotension.  Started on heparin drip and diltiazem in ER.  Given vancomycin and Zosyn.  More labs review shows June of this year with urine Legionella positive (data deficit).  Blood cultures pending.  Dr. Mayfield with neurology at [facility] spoke with [facility] stroke team.  They also spoke with Dr. Jernigan with urology regarding new bladder mass and hematuria, and Dr. Espinoza with cardiology regarding A-fib RVR.  Agreed to transfer to [facility] for higher level of care.     Assessment/Plan:     Gross hematuria  Bladder mass with hydro  Hx recent LAY and hydro with creatinine 9 and requiring bilateral nephrostomy tubes (at [facility] 1/20/2024 - 3/20/2024 hospitalization)  Acute UTI  Hx prostate cancer s/p radiation   -Urology recs appreciated.  Cont CBI for now.    -Hold Heparin gtt.   -Continue Rocephin  -Urine Cx pending  -Blood Cx pending  -Cr 1.5 today, which is slightly up from previous.  1.21 on 7/30/24    Aphasia, resolved  Rule out CVA, TIA  --Seen by Tele-Neurologist Dr Mayfield at [facility]  -- Patient not a candidate for any acute thrombolytic therapy or any acute thrombectomy as his NIH stroke scale is 0   --CTA head/neck negative  --Stroke team following.  --MRI brain with symmetric nonspecific decreased signal intensity within the basal ganglia.  --Echo with bubble pending     Hx of HTN now with hypotension  --Hold BP meds  --Monitor closely     A-fib RVR  Hx HTN/HLD/CAD  --Per cards recs, start metoprolol po.    --HR remains controlled currently  --Heparin gtt HELD due to gross hematuria.  Restart ASA  --Echo pending--MRI not concerning for cardioembolic process.    --Cont to hold anticoagulation for now due to hematuria     Bilateral pneumonia  Recent Urine Legionella POSITIVE (6/11/2024)  --Per CTA chest.  Recent hx of PNA and was treated.  No signs or sx of active infection.  --Still + for legionella, but no sx.  Therefore, will stop all abx.   --PRN nebs     Hx AAA  -- Reports had surgery several years ago.  -- CT A/P today [facility] shows stable appearing infra renal abdominal aortic aneurysm with extension into the common iliac arteries.     Hx DM type II (A1c from 7/30/2024 was 9.2)  -- Reports now diet controlled, not on any meds  --LDSSI for now      COPD  Chronic tobacco use  --nicotine patch, assist/advise  --oxygen as needed      ETOH use  --reports one shot of whiskey daily   --CIWA, if starts scoring, will add PRN's    --monitor    Hypothyroidism  --TSH is elevated.  Per pharmacy fill records it looks like this was just started on 8/1.  Continue current dose of Synthroid.    Expected Discharge Location and Transportation: home  Expected Discharge   Expected Discharge Date: 8/8/2024; Expected Discharge Time:      VTE Prophylaxis:  Pharmacologic & mechanical VTE prophylaxis orders are present.         AM-PAC 6 Clicks Score (PT): 20 (08/05/24 3691)    CODE STATUS:   Code Status and Medical Interventions: CPR (Attempt to Resuscitate); Full Support   Ordered at: 08/04/24 0440     Level Of Support Discussed With:    Patient     Code Status (Patient has no pulse and is not breathing):    CPR (Attempt to Resuscitate)     Medical Interventions (Patient has pulse or is breathing):    Full Support       Rylie Steinberg MD  08/05/24

## 2024-08-05 NOTE — PROGRESS NOTES
progress note    Patient is comfortable  Urine only slightly blood-tinged, minimal CBI     creatinine down to 1.38  PSA 0.038      Impression - hematuria, currently well-controlled on CBI                       - prostate cancer                      -Mild right hydronephrosis      Plan-continue CBI, I will compare current films to those in the last several months in regards to mild right hydronephrosis with normal renal function feel intervention for this is not likely necessary urgently.
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
## 2024-08-05 NOTE — PLAN OF CARE
"Goal Outcome Evaluation:  Plan of Care Reviewed With: patient                          SLP Diagnosis Comments: Basic speech/language skills seemingly intact. Pt oriented x3. When attempted further cognitive-linguistic assessment, pt declined to participate stating, \"this is stupid.\" Pt asked SLP to \"leave and go home.\" Will attempt to f/u for further cognitive-linguistic assessment tomorrow as indicated/if pt agreeable. (08/05/24 6758)                "
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
## 2024-08-06 PROBLEM — I95.2 HYPOTENSION DUE TO DRUGS: Status: RESOLVED | Noted: 2024-03-26 | Resolved: 2024-08-06

## 2024-08-06 LAB
ANION GAP SERPL CALCULATED.3IONS-SCNC: 8 MMOL/L (ref 5–15)
ASCENDING AORTA: 3.6 CM
BASOPHILS # BLD AUTO: 0.04 10*3/MM3 (ref 0–0.2)
BASOPHILS NFR BLD AUTO: 0.5 % (ref 0–1.5)
BH CV ECHO MEAS - AO MAX PG: 3.9 MMHG
BH CV ECHO MEAS - AO MEAN PG: 3 MMHG
BH CV ECHO MEAS - AO ROOT DIAM: 3.7 CM
BH CV ECHO MEAS - AO V2 MAX: 99.1 CM/SEC
BH CV ECHO MEAS - EF(MOD-BP): 47.9 %
BH CV ECHO MEAS - IVS/LVPW: 1 CM
BH CV ECHO MEAS - IVSD: 0.9 CM
BH CV ECHO MEAS - LA DIMENSION: 4.6 CM
BH CV ECHO MEAS - LAT PEAK E' VEL: 17.3 CM/SEC
BH CV ECHO MEAS - LV MAX PG: 1.57 MMHG
BH CV ECHO MEAS - LV MEAN PG: 1 MMHG
BH CV ECHO MEAS - LV V1 MAX: 62.6 CM/SEC
BH CV ECHO MEAS - LV V1 VTI: 12 CM
BH CV ECHO MEAS - LVIDD: 4.7 CM
BH CV ECHO MEAS - LVIDS: 3.4 CM
BH CV ECHO MEAS - LVOT DIAM: 2.1 CM
BH CV ECHO MEAS - LVPWD: 0.9 CM
BH CV ECHO MEAS - MED PEAK E' VEL: 11.63 CM/SEC
BH CV ECHO MEAS - MR MAX PG: 89 MMHG
BH CV ECHO MEAS - MR MAX VEL: 471.6 CM/SEC
BH CV ECHO MEAS - MV E MAX VEL: 80.7 CM/SEC
BH CV ECHO MEAS - MV MAX PG: 3.2 MMHG
BH CV ECHO MEAS - MV MEAN PG: 1.38 MMHG
BH CV ECHO MEAS - RAP SYSTOLE: 3 MMHG
BH CV ECHO MEAS - RVSP: 24.4 MMHG
BH CV ECHO MEAS - TAPSE (>1.6): 2.5 CM
BH CV ECHO MEAS - TR MAX PG: 21.4 MMHG
BH CV ECHO MEAS - TR MAX VEL: 231 CM/SEC
BH CV ECHO MEASUREMENTS AVERAGE E/E' RATIO: 5.58
BH CV ECHO SHUNT ASSESSMENT PERFORMED (HIDDEN SCRIPTING): 1
BH CV XLRA - RV BASE: 4.8 CM
BH CV XLRA - RV LENGTH: 7.2 CM
BH CV XLRA - RV MID: 3.9 CM
BH CV XLRA - TDI S': 12.87 CM/SEC
BUN SERPL-MCNC: 15 MG/DL (ref 8–23)
BUN/CREAT SERPL: 10.6 (ref 7–25)
CALCIUM SPEC-SCNC: 8.3 MG/DL (ref 8.6–10.5)
CHLORIDE SERPL-SCNC: 107 MMOL/L (ref 98–107)
CO2 SERPL-SCNC: 24 MMOL/L (ref 22–29)
CREAT SERPL-MCNC: 1.42 MG/DL (ref 0.76–1.27)
DEPRECATED RDW RBC AUTO: 72.8 FL (ref 37–54)
EGFRCR SERPLBLD CKD-EPI 2021: 51.9 ML/MIN/1.73
EOSINOPHIL # BLD AUTO: 0.1 10*3/MM3 (ref 0–0.4)
EOSINOPHIL NFR BLD AUTO: 1.3 % (ref 0.3–6.2)
ERYTHROCYTE [DISTWIDTH] IN BLOOD BY AUTOMATED COUNT: 19 % (ref 12.3–15.4)
GLUCOSE BLDC GLUCOMTR-MCNC: 115 MG/DL (ref 70–130)
GLUCOSE BLDC GLUCOMTR-MCNC: 131 MG/DL (ref 70–130)
GLUCOSE BLDC GLUCOMTR-MCNC: 155 MG/DL (ref 70–130)
GLUCOSE BLDC GLUCOMTR-MCNC: 93 MG/DL (ref 70–130)
GLUCOSE SERPL-MCNC: 73 MG/DL (ref 65–99)
HCT VFR BLD AUTO: 27 % (ref 37.5–51)
HGB BLD-MCNC: 8.4 G/DL (ref 13–17.7)
IMM GRANULOCYTES # BLD AUTO: 0.06 10*3/MM3 (ref 0–0.05)
IMM GRANULOCYTES NFR BLD AUTO: 0.8 % (ref 0–0.5)
LEFT ATRIUM VOLUME INDEX: 45.8 ML/M2
LYMPHOCYTES # BLD AUTO: 1.23 10*3/MM3 (ref 0.7–3.1)
LYMPHOCYTES NFR BLD AUTO: 15.8 % (ref 19.6–45.3)
MAGNESIUM SERPL-MCNC: 1.8 MG/DL (ref 1.6–2.4)
MCH RBC QN AUTO: 32.8 PG (ref 26.6–33)
MCHC RBC AUTO-ENTMCNC: 31.1 G/DL (ref 31.5–35.7)
MCV RBC AUTO: 105.5 FL (ref 79–97)
MONOCYTES # BLD AUTO: 0.56 10*3/MM3 (ref 0.1–0.9)
MONOCYTES NFR BLD AUTO: 7.2 % (ref 5–12)
NEUTROPHILS NFR BLD AUTO: 5.81 10*3/MM3 (ref 1.7–7)
NEUTROPHILS NFR BLD AUTO: 74.4 % (ref 42.7–76)
NRBC BLD AUTO-RTO: 0 /100 WBC (ref 0–0.2)
PLATELET # BLD AUTO: 161 10*3/MM3 (ref 140–450)
PMV BLD AUTO: 10 FL (ref 6–12)
POTASSIUM SERPL-SCNC: 4 MMOL/L (ref 3.5–5.2)
RBC # BLD AUTO: 2.56 10*6/MM3 (ref 4.14–5.8)
SODIUM SERPL-SCNC: 139 MMOL/L (ref 136–145)
WBC NRBC COR # BLD AUTO: 7.8 10*3/MM3 (ref 3.4–10.8)

## 2024-08-06 PROCEDURE — 99232 SBSQ HOSP IP/OBS MODERATE 35: CPT | Performed by: PHYSICIAN ASSISTANT

## 2024-08-06 PROCEDURE — 92523 SPEECH SOUND LANG COMPREHEN: CPT

## 2024-08-06 PROCEDURE — 83735 ASSAY OF MAGNESIUM: CPT | Performed by: PEDIATRICS

## 2024-08-06 PROCEDURE — 92610 EVALUATE SWALLOWING FUNCTION: CPT

## 2024-08-06 PROCEDURE — 99232 SBSQ HOSP IP/OBS MODERATE 35: CPT | Performed by: PEDIATRICS

## 2024-08-06 PROCEDURE — 85025 COMPLETE CBC W/AUTO DIFF WBC: CPT | Performed by: PEDIATRICS

## 2024-08-06 PROCEDURE — 80048 BASIC METABOLIC PNL TOTAL CA: CPT | Performed by: PEDIATRICS

## 2024-08-06 PROCEDURE — 82948 REAGENT STRIP/BLOOD GLUCOSE: CPT

## 2024-08-06 PROCEDURE — 25810000003 SODIUM CHLORIDE 0.9 % SOLUTION: Performed by: PEDIATRICS

## 2024-08-06 RX ORDER — SODIUM CHLORIDE 9 MG/ML
100 INJECTION, SOLUTION INTRAVENOUS CONTINUOUS
Status: ACTIVE | OUTPATIENT
Start: 2024-08-06 | End: 2024-08-06

## 2024-08-06 RX ADMIN — OXYBUTYNIN CHLORIDE 2.5 MG: 5 TABLET ORAL at 06:21

## 2024-08-06 RX ADMIN — CYANOCOBALAMIN TAB 1000 MCG 1000 MCG: 1000 TAB at 08:16

## 2024-08-06 RX ADMIN — Medication 10 ML: at 21:24

## 2024-08-06 RX ADMIN — ASPIRIN 81 MG: 81 TABLET, COATED ORAL at 08:16

## 2024-08-06 RX ADMIN — PANTOPRAZOLE SODIUM 40 MG: 40 TABLET, DELAYED RELEASE ORAL at 05:39

## 2024-08-06 RX ADMIN — ATORVASTATIN CALCIUM 80 MG: 40 TABLET, FILM COATED ORAL at 20:25

## 2024-08-06 RX ADMIN — SODIUM CHLORIDE 100 ML/HR: 9 INJECTION, SOLUTION INTRAVENOUS at 08:16

## 2024-08-06 RX ADMIN — LEVOTHYROXINE SODIUM 50 MCG: 0.05 TABLET ORAL at 05:39

## 2024-08-06 NOTE — PROGRESS NOTES
[facility]  In Patient progress note   LOS: 2 days   Patient Care Team:  Nain Morel MD as PCP - General (Internal Medicine)  Hanna Angel MD as Consulting Physician (Internal Medicine)  Keshawn Morejon MD as Referring Physician (Pulmonary Disease)  Viktoriya Kovasc MD as Consulting Physician (Radiation Oncology)  Martinez Love, DO ([facility])    Follow up for Cheif Complaint EP: Atrial Fibrillation    Subjective no awareness of palpitations.  No chest pain, no dyspnea.  Eating okay.  Has not ambulated today.  Continues to have gross hematuria         Active Hospital Problems    Diagnosis  POA    **Gross hematuria [R31.0]  Yes    Atrial fibrillation with rapid ventricular response [I48.91]  Yes     Priority: High    Transient ischemic attack (TIA) [G45.9]  Yes    Bladder mass [N32.89]  Yes    Acute UTI (urinary tract infection) [N39.0]  Yes    Bilateral pneumonia [J18.9]  Yes    Abdominal aortic aneurysm (AAA) without rupture [I71.40]  Yes    History of oropharyngeal cancer [Z85.819]  Yes    History of lung cancer [Z85.118]  Not Applicable    History of prostate cancer [Z85.46]  Not Applicable    Physical deconditioning [R53.81]  Yes    History of laminectomy [Z98.890]  Not Applicable     Status post L1-L2 laminectomy on 4/11/2024 at [facility] given note of intradural extramedullary tumor, pathology currently pending.      Anemia [D64.9]  Yes    Cigarette smoker [F17.210]  Yes     Longstanding 1 pack/day cigarette smoker x 55 years, currently 1 to 3 cigarettes/day smoker as of 7/2024.      CAD (coronary artery disease) [I25.10]  Yes     [facility] 10/6/2017: 50% mid left circumflex stenosis with normal IFR, normal LV function   Echocardiogram 1/2/2024 with normal sized LV, mild LVH, EF 50%, indeterminate diastolic dysfunction, dilated RV      Primary hypertension [I10]  Yes    Type 2 diabetes mellitus with diabetic polyneuropathy, without long-term  current use of insulin [E11.42]  Yes               Tele: Atrial Fib with Controlled Rate    Vitals:  Temp:  [97.3 °F (36.3 °C)-98.2 °F (36.8 °C)] 98.1 °F (36.7 °C)  Heart Rate:  [] 95  Resp:  [17-19] 17  BP: ()/(51-83) 101/73  Flow (L/min):  [2] 2    Body mass index is 21.31 kg/m².    Intake/Output Summary (Last 24 hours) at 8/6/2024 0735  Last data filed at 8/6/2024 0655  Gross per 24 hour   Intake --   Output 5750 ml   Net -5750 ml       Physical Exam:      General: alert, no acute distress, acyanotic, well developed, well nourished   Chest: Clear Auscultation   CV: Rhythm: regularly irregular   Extremities: negative    Results Review:         Labs:  Results from last 7 days   Lab Units 08/05/24  1126 08/04/24  1647 08/04/24  0950   WBC 10*3/mm3 7.83   < > 8.35   HEMOGLOBIN g/dL 8.5*   < > 10.5*   HEMATOCRIT % 27.0*   < > 32.6*   PLATELETS 10*3/mm3 159   < > 223   INR   --   --  0.97    < > = values in this interval not displayed.     Results from last 7 days   Lab Units 08/05/24  1126 08/04/24  1647 08/04/24  0950 08/04/24  0950 07/30/24  1549   SODIUM mmol/L 137 134*   < > 134* 135   POTASSIUM mmol/L 4.5 4.8   < > 4.6 4.5   CHLORIDE mmol/L 107 104   < > 102 101   CO2 mmol/L 20.0* 19.0*   < > 18.5* 19*   BUN mg/dL 16 19   < > 19 20   CREATININE mg/dL 1.50* 1.38*   < > 1.48* 1.21   GLUCOSE mg/dL 113* 82   < > 85 98   CALCIUM mg/dL 8.3* 8.2*   < > 9.5 9.1   ALK PHOS U/L  --  75   < > 97 95   ALT (SGPT) U/L  --  <5   < > 5 5   AST (SGOT) U/L  --  11  --  12 9    < > = values in this interval not displayed.     Estimated Creatinine Clearance: 43.6 mL/min (A) (by C-G formula based on SCr of 1.5 mg/dL (H)).  Results from last 7 days   Lab Units 08/04/24  1647 08/04/24  1211 08/04/24  0950   HSTROP T ng/L 87* 95* 91*           Scheduled Meds:  aspirin, 81 mg, Oral, Daily  atorvastatin, 80 mg, Oral, Nightly  insulin lispro, 2-7 Units, Subcutaneous, 4x Daily AC & at Bedtime  levothyroxine, 50 mcg, Oral, Q  AM  nicotine, 1 patch, Transdermal, Q24H  pantoprazole, 40 mg, Oral, Q AM  sodium chloride, 10 mL, Intravenous, Q12H  vitamin B-12, 1,000 mcg, Oral, Daily        Continuous Infusions:[Held by provider] heparin, 12 Units/kg/hr, Last Rate: Stopped (08/04/24 1845)  Pharmacy to Dose Heparin,   sodium chloride, 100 mL/hr          Assessment: Plan:    Permanent atrial fibrillation  Presented with rapid ventricular rate in the setting of hypotension, anemia, pneumonia  Currently well rate managed    2. Hematuria   -Not on anticoagulation since March of this year due to gross hematuria   -Continuous bladder irrigation per urology         Electronically signed by SUAD Madsen, 08/06/24, 10:40 AM EDT.

## 2024-08-06 NOTE — THERAPY RE-EVALUATION
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 declined to participate  -       Attention (Cognitive) selective;[facility]  - --       Thought Organization (Cognitive) -- WFL;abstract convergent  -       Problem Solving (Cognitive) simple;[facility] --       Executive Function (Cognition) deficit awareness;judgement;other (see comments)  - --       Cognition, Comment No recall of hx thickened liquids. Denies any dysphagia. ? judgement and insight into deficits. Pt was agreeable to basic questions though increasigly resistant as SLP progressed/advanced targeted tasks. Do not suspect pt will agree to tx though will f/u when family present to ensure no further needs/benefit to services.  - --          SLP Evaluation Clinical Impressions    SLP Diagnosis cognitive-linguistic disorder  - --       SLP Diagnosis Comments -- Basic speech/language skills seemingly intact. Pt oriented x3. When attempted further cognitive-linguistic assessment, pt declined to participate stating, \"this is stupid.\" Pt asked SLP to \"leave and go home.\" Will attempt to f/u for further cognitive-linguistic assessment tomorrow as indicated/if pt agreeable.  -       Rehab Potential/Prognosis guarded  - fair  -       SLC Criteria " for Skilled Therapy Interventions Met yes  -SM yes  -AC          Recommendations    Therapy Frequency (SLP SLC) PRN;5 days per week  -SM --       Demonstrates Need for Referral to Another Service -- speech therapy;other (see comments)  consider SLP consult for dysphagia (given hx described), if indicated per provider's discretion  -AC                 User Key  (r) = Recorded By, (t) = Taken By, (c) = Cosigned By      Initials Name Effective Dates     Shira Guzmán, MS CCC-SLP 02/03/23 -     AC Yennifer Armstrong, MS CCC-SLP 02/03/23 -                        EDUCATION  The patient has been educated in the following areas:     Cognitive Impairment Communication Impairment.           SLP GOALS       Row Name 08/06/24 0945             (LTG) Patient will demonstrate functional swallow for    Diet Texture (Demonstrate functional swallow) regular textures  -SM      Liquid viscosity (Demonstrate functional swallow) thin liquids  -SM      [place] (Demonstrate functional swallow) independently (over 90% accuracy)  -SM      Time Frame (Demonstrate functional swallow) 1 week  -SM         ([facility]) Patient will tolerate trials of    Consistencies Trialed (Tolerate trials) regular textures;thin liquids  -SM      Desired Outcome (Tolerate trials) without signs/symptoms of aspiration  -SM      [place] (Tolerate trials) independently (over 90% accuracy)  -SM      Time Frame (Tolerate trials) 1 week  -SM         Patient will demonstrate functional cognitive-linguistic skills for return to discharge environment    [place] Independently  -SM      Time frame 1 week  -SM         SLP Diagnostic Treatment     Patient will participate in further assessment in the following areas clarification of baseline cognitive communication status  -SM      Time Frame (Diagnostic) 1 week  -SM         Executive Functional Skills Goal 1 (SLP)    Improve Executive Function Skills Goal 1 (SLP) demonstrate awareness of deficit;identify  strategies, strengths, limitations;identify anticipated needs;home management activity;100%;independently (over 90% accuracy)  -      Time Frame (Executive Function Skills Goal 1, SLP) 1 week  -SM                User Key  (r) = Recorded By, (t) = Taken By, (c) = Cosigned By      Initials Name Provider Type    Shira Pineda MS CCC-SLP Speech and Language Pathologist                              Time Calculation:      Time Calculation- SLP       Row Name 08/06/24 1131             Time Calculation- SLP    SLP Start Time 0945  -      SLP Received On 08/06/24  -         Untimed Charges    [id number] Eval Speech and Production w/ Language Minutes 25  -SM      [id number] Eval Oral Pharyng Swallow Minutes 25  -SM         Total Minutes    Untimed Charges Total Minutes 50  -SM       Total Minutes 50  -SM                User Key  (r) = Recorded By, (t) = Taken By, (c) = Cosigned By      Initials Name Provider Type    Shira Pineda MS CCC-SLP Speech and Language Pathologist                    Therapy Charges for Today       Code Description Service Date Service Provider Modifiers Qty    85881521036 HC ST EVAL ORAL PHARYNG SWALLOW 2 8/6/2024 Shira Guzmán MS CCC-SLP GN 1    [id number] HC ST EVAL SPEECH AND PROD W LANG  2 8/6/2024 Shira Guzmán MS CCC-SLP GN 1                       Shira Guzmán MS CCC-BANDAR  8/6/2024

## 2024-08-06 NOTE — PROGRESS NOTES
[facility]  PROGRESS NOTE    Patient Name: Stephane Noe  : 1950  MRN: [id number]    Date of Admission: 2024  Primary Care Physician: Nain Morel MD    Subjective   Subjective     CC:  Hematuria    HPI:  Pt states he is doing better.  No SOB, CP.  BP normally runs a little low per his report.  Still with hematuria, CBI continuing but overall seems to be improving.    Per SLP-last hospitalization he had a swallow study that did show some aspiration.  Patient has not been using any thickener and is drinking thin liquids.  Discussed with patient and he is not interested in thickening anything and has refused to work with speech therapy here if that involves him going downstairs for any further test.  Currently without any evidence of a new pneumonia.    Objective   Objective     Vital Signs:   Temp:  [97.3 °F (36.3 °C)-98.2 °F (36.8 °C)] 98.1 °F (36.7 °C)  Heart Rate:  [] 95  Resp:  [17-19] 17  BP: ()/(51-83) 101/73  Flow (L/min):  [2] 2     Physical Exam:  Constitutional: No acute distress, awake, alert  HENT: NCAT, mucous membranes moist  Respiratory: Clear to auscultation bilaterally, respiratory effort normal   Cardiovascular: irregular rate and rhythm-controlled, no murmurs, rubs, or gallops  Gastrointestinal: Positive bowel sounds, soft, tender in suprapubic area, nondistended, [place] in place with hematuria present  Musculoskeletal: No bilateral ankle edema  Psychiatric: Appropriate affect, cooperative  Neurologic: Oriented x 3, strength symmetric in all extremities, Cranial Nerves grossly intact to confrontation, speech clear  Skin: No rashes      Results Reviewed:  LAB RESULTS:      Lab 24  1126 24  1647 24  1341 24  1250 24  0950 24  1549   WBC 7.83 8.18  --   --  8.35 7.9   HEMOGLOBIN 8.5* 9.5*  --   --  10.5* 9.2*   HEMATOCRIT 27.0* 30.2*  --   --  32.6* 29.0*   PLATELETS 159 197  --   --  223 237    NEUTROS ABS 6.37 6.56  --   --  5.70 6.0   IMMATURE GRANS (ABS) 0.04 0.06*  --   --  0.08*  --    LYMPHS ABS 0.78 0.86  --   --  1.76 1.3   MONOS ABS 0.58 0.64  --   --  0.66 0.4   EOS ABS 0.04 0.03  --   --  0.10 0.1   .7* 102.4*  --   --  102.2* 102*   SED RATE 40*  --   --   --   --   --    PROCALCITONIN  --   --   --   --  0.23  --    LACTATE  --  1.7  --  3.5*  --   --    PROTIME  --   --   --   --  13.3  --    APTT  --   --   --   --  31.3*  --    HEPARIN ANTI-XA  --   --  0.10*  --   --   --          Lab 08/05/24  1126 08/04/24 1647 08/04/24  0950 07/30/24  1549   SODIUM 137 134* 134* 135   POTASSIUM 4.5 4.8 4.6 4.5   CHLORIDE 107 104 102 101   CO2 20.0* 19.0* 18.5* 19*   ANION GAP 10.0 11.0 13.5  --    BUN 16 19 19 20   CREATININE 1.50* 1.38* 1.48* 1.21   EGFR 48.6* 53.7* 49.3*  --    GLUCOSE 113* 82 85 98   CALCIUM 8.3* 8.2* 9.5 9.1   MAGNESIUM  --   --  1.8  --    HEMOGLOBIN A1C  --   --   --  5.2   TSH 11.530*  --   --  25.200*         Lab 08/04/24 1647 08/04/24  0950 07/30/24  1549   TOTAL PROTEIN 5.5* 6.9  --    ALBUMIN 2.6* 3.4* 3.1*   GLOBULIN 2.9 3.5  --    ALT (SGPT) <5 5 5   AST (SGOT) 11 12 9   BILIRUBIN 0.4 0.2 0.3   ALK PHOS 75 97 95         Lab 08/04/24 1647 08/04/24  1211 08/04/24  0950   HSTROP T 87* 95* 91*   PROTIME  --   --  13.3   INR  --   --  0.97         Lab 08/05/24  1126   CHOLESTEROL 117   LDL CHOL 57   HDL CHOL 42   TRIGLYCERIDES 96         Lab 08/05/24  1126 08/04/24  1258 08/04/24  0950 07/30/24  1549   IRON SATURATION  --   --   --  20   TIBC  --   --   --  180*   FERRITIN  --   --   --  287   UIBC  --   --   --  144   FOLATE 8.30  --   --  11.3   VITAMIN B 12 602  --   --  717   ABO TYPING  --  A   < >  --    RH TYPING  --  Positive   < >  --    ANTIBODY SCREEN  --  Negative  --   --     < > = values in this interval not displayed.         Brief Urine Lab Results  (Last result in the past 365 days)        Color   Clarity   Blood   Leuk Est   Nitrite   Protein    CREAT   Urine HCG        08/04/24 1717 Red   Turbid   Large (3+)   Moderate (2+)   Positive   >=300 mg/dL (3+)                   Microbiology Results Abnormal       Procedure Component Value - Date/Time    Urine Culture - Urine, Urine, Clean Catch [[id number]] Collected: 08/04/24 1717    Lab Status: Final result Specimen: Urine, Clean Catch Updated: 08/05/24 2051     Urine Culture <10,000 CFU/mL Normal Urogenital Jayne    Narrative:      Colonization of the urinary tract without infection is common. Treatment is discouraged unless the patient is symptomatic, pregnant, or undergoing an invasive urologic procedure.    S. Pneumo Ag Urine or CSF - Urine, Urine, Clean Catch [[id number]]  (Normal) Collected: 08/04/24 1717    Lab Status: Final result Specimen: Urine, Clean Catch Updated: 08/04/24 2155     Strep Pneumo Ag Negative    MRSA Screen, PCR (Inpatient) - Swab, Nares [[id number]]  (Normal) Collected: 08/04/24 1820    Lab Status: Final result Specimen: Swab from Nares Updated: 08/04/24 1935     MRSA PCR Negative    Narrative:      The negative predictive value of this diagnostic test is high and should only be used to consider de-escalating anti-MRSA therapy. A positive result may indicate colonization with MRSA and must be correlated clinically.  MRSA Negative    Respiratory Panel PCR w/COVID-19(SARS-CoV-2) MORALES/DUNG/ERWIN/PAD/COR/ANDIE In-House, NP Swab in UTM/VTM, 2 HR TAT - Swab, Nasopharynx [[id number]]  (Normal) Collected: 08/04/24 1716    Lab Status: Final result Specimen: Swab from Nasopharynx Updated: 08/04/24 1804     ADENOVIRUS, PCR Not Detected     Coronavirus 229E Not Detected     Coronavirus HKU1 Not Detected     Coronavirus NL63 Not Detected     Coronavirus OC43 Not Detected     COVID19 Not Detected     Human Metapneumovirus Not Detected     Human Rhinovirus/Enterovirus Not Detected     Influenza A PCR Not Detected     Influenza B PCR Not Detected     Parainfluenza Virus 1 Not Detected     Parainfluenza Virus 2  Not Detected     Parainfluenza Virus 3 Not Detected     Parainfluenza Virus 4 Not Detected     RSV, PCR Not Detected     Bordetella pertussis pcr Not Detected     Bordetella parapertussis PCR Not Detected     Chlamydophila pneumoniae PCR Not Detected     Mycoplasma pneumo by PCR Not Detected    Narrative:      In the setting of a positive respiratory panel with a viral infection PLUS a negative procalcitonin without other underlying concern for bacterial infection, consider observing off antibiotics or discontinuation of antibiotics and continue supportive care. If the respiratory panel is positive for atypical bacterial infection (Bordetella pertussis, Chlamydophila pneumoniae, or Mycoplasma pneumoniae), consider antibiotic de-escalation to target atypical bacterial infection.            MRI Brain With & Without Contrast    Result Date: 8/5/2024  MRI BRAIN W WO CONTRAST Date of Exam: 8/4/2024 10:32 PM EDT Indication: Stroke, follow up stroke workup, possible bladder CA.  Comparison: 12/12/2019 Technique:  Routine multiplanar/multisequence sequence images of the brain were obtained before and after the uneventful administration of 10 cc Multihance. Findings: There is no diffusion restriction to suggest acute infarct. There is no evidence of acute or chronic intracranial hemorrhage. No mass effect or midline shift. There is generalized cerebral atrophy. No abnormal extra-axial collections. Minimal progression of mild to moderate nonspecific white matter signal abnormality without mass effect nor enhancement suggestive of chronic microvascular ischemic disease. Symmetric nonspecific decreasing signal intensity within the basal ganglia which  may be senescent in etiology. Midline structures are intact. No significant cortical abnormality is identified. Calvarial and superficial soft tissue signal is within normal limits. Orbits appear unremarkable. The paranasal sinuses and the mastoid air cells appear well aerated.  There is no abnormal intracranial enhancement. There is normal enhancement within the intracranial vasculature including the dural venous sinuses.     Impression: Impression: 1.No evidence of acute intracranial process nor metastatic disease. 2.Minimal progression of nonspecific white matter changes likely related to microvascular ischemic disease. 3.Symmetric nonspecific decreased signal intensity within the basal ganglia which can be senescent in nature. Electronically Signed: Steve Peace MD  8/5/2024 10:30 AM EDT  Workstation ID: [id number]    CT Angiogram Head    Result Date: 8/4/2024  PROCEDURE: CT ANGIOGRAM HEAD-, CT ANGIOGRAM NECK-  HISTORY: acute onset aphasia; R31.9-Hematuria, unspecified; I48.91-Unspecified atrial fibrillation; J18.9-Pneumonia, unspecified organism; R47.01-Aphasia; N39.0-Urinary tract infection, site not specified; R31.9-Hematuria, unspecified  TECHNIQUE: Thin section axial CT with IV contrast supplemented with multiplanar 3 D reconstructions of the head and neck. This study was performed with techniques to keep radiation doses as low as reasonably achievable, (ALARA). Individualized dose reduction techniques using automated exposure control or adjustment of mA and/or kV according to the patient size were employed.  FINDINGS:  Head CT: The ventricles are moderately enlarged indicating atrophy appropriate for age. There is contrast in the transverse, straight and sagittal sinuses from contrast exams performed less than 2 hours prior. Some contrast persists in the basilar and vertebral arteries as well. There is no evidence of hemorrhage. No masses are identified.  No extra-axial fluid is seen. The sinuses are unremarkable.  CTA:  Aortic arch:  Arch shows no significant narrowing. Great vessel origins are widely patent. Lung apices demonstrate peripheral groundglass opacities suggesting pneumonia, possibly viral. This was demonstrated on the CT chest. Mild emphysematous change noted. Right  carotid: There is mild calcified plaque in the right common carotid artery. In right bulb there is mild calcified plaque. This causes less than 50% stenosis.  Left carotid: There is mild calcified plaque in the left carotid bulb causing less than 50% stenosis.  Vertebrals: The vertebrals are patent. No significant stenosis is present. System is left vertebral dominant.  The cranial circulation is unremarkable. There is no significant stenosis, aneurysm, or occlusion.      Impression: Less than 50% stenosis bilateral internal carotid arteries.  Normal intracranial circulation.  Peripheral bilateral pneumonia, possibly viral.    CTDI: 32.17 mGy DLP:601.33 mGy.cm   This report was signed and finalized on 8/4/2024 1:50 PM by Rebecca Godwin MD.      CT Angiogram Neck    Result Date: 8/4/2024  PROCEDURE: CT ANGIOGRAM HEAD-, CT ANGIOGRAM NECK-  HISTORY: acute onset aphasia; R31.9-Hematuria, unspecified; I48.91-Unspecified atrial fibrillation; J18.9-Pneumonia, unspecified organism; R47.01-Aphasia; N39.0-Urinary tract infection, site not specified; R31.9-Hematuria, unspecified  TECHNIQUE: Thin section axial CT with IV contrast supplemented with multiplanar 3 D reconstructions of the head and neck. This study was performed with techniques to keep radiation doses as low as reasonably achievable, (ALARA). Individualized dose reduction techniques using automated exposure control or adjustment of mA and/or kV according to the patient size were employed.  FINDINGS:  Head CT: The ventricles are moderately enlarged indicating atrophy appropriate for age. There is contrast in the transverse, straight and sagittal sinuses from contrast exams performed less than 2 hours prior. Some contrast persists in the basilar and vertebral arteries as well. There is no evidence of hemorrhage. No masses are identified.  No extra-axial fluid is seen. The sinuses are unremarkable.  CTA:  Aortic arch:  Arch shows no significant narrowing. Great vessel  origins are widely patent. Lung apices demonstrate peripheral groundglass opacities suggesting pneumonia, possibly viral. This was demonstrated on the CT chest. Mild emphysematous change noted. Right carotid: There is mild calcified plaque in the right common carotid artery. In right bulb there is mild calcified plaque. This causes less than 50% stenosis.  Left carotid: There is mild calcified plaque in the left carotid bulb causing less than 50% stenosis.  Vertebrals: The vertebrals are patent. No significant stenosis is present. System is left vertebral dominant.  The cranial circulation is unremarkable. There is no significant stenosis, aneurysm, or occlusion.      Impression: Less than 50% stenosis bilateral internal carotid arteries.  Normal intracranial circulation.  Peripheral bilateral pneumonia, possibly viral.    CTDI: 32.17 mGy DLP:601.33 mGy.cm   This report was signed and finalized on 8/4/2024 1:50 PM by Rebecca Godwin MD.      CT Abdomen Pelvis With Contrast    Result Date: 8/4/2024  PROCEDURE: CT ABDOMEN PELVIS W CONTRAST-  HISTORY: recent nephrostomy, hematuria, difficulty urinating rule out stone  COMPARISON: 06/11/2024.  PROCEDURE: The patient was injected with IV contrast. Oral contrast was administered. Axial images were obtained from the lung bases to the pubic symphysis by computed tomography. This study was performed with techniques to keep radiation doses as low as reasonably achievable, (ALARA). Individualized dose reduction techniques using automated exposure control or adjustment of mA and/or kV according to the patient size were employed.  FINDINGS:  ABDOMEN: The lung bases are clear. The heart is proper size. The liver is homogenous with no focal abnormality. Gallbladder present with no CT visible stones. The spleen is unremarkable. No adrenal mass is present. The pancreas demonstrates no definite abnormality but sensitivity significantly decreased from motion and streaking artifact. The  kidneys demonstrate bilateral hydronephrosis which is stable on the left despite interval placement of a ureteral stent. Stent appears to be in good position with the proximal pigtail in the left renal pelvis and the distal pigtail extending through the focal bladder wall thickening/mass into the right side of the bladder. Hydronephrosis on the right is worsened from prior exam. Hydronephrosis appears to be secondary to the enhancing nodular thickening of the posterior wall of the bladder; some enhancing nodularity extends into the distal right ureter consistent with mass and suspected cause of the hydronephrosis. Prostate is normal in size with fiducial markers. There is small amount of presacral fluid/infiltration that is similar to prior exam. There is moderate bilateral perirenal stranding improved bilaterally. The aorta is again noted to be ectatic and dilated with vascular calcifications and mural thrombus/plaque. Maximum AP diameter is 48 mm, stable from prior. Aneurysm begins just distal to the renal arteries and extends to the bifurcation with dilatation of the common iliac arteries as well. Appearance is stable from prior. There is no free fluid or adenopathy. Streak artifact from patient's arm position noted. There is motion artifact on some images as well.  PELVIS: The GI tract demonstrates no obstruction.  The appendix is not definitely identified but could be missed secondary to motion/streak artifact. The urinary bladder is unremarkable. There is no free fluid, adenopathy, or inflammatory process.      Impression: Persistent, moderate left hydronephrosis despite ureteral stent placement.  Worsened right moderate hydronephrosis which appears to be secondary to a right posterior bladder wall mass with some extension of the enhancing mass into the distal right ureter.  Stable appearance of the infrarenal abdominal aortic aneurysm with extension into the common iliac arteries.  CTDI: 8.21 mGy DLP:737.75  mGy.cm  This report was signed and finalized on 8/4/2024 1:07 PM by Rebecca Godwin MD.      CT Angiogram Chest Pulmonary Embolism    Result Date: 8/4/2024  PROCEDURE: CT ANGIOGRAM CHEST PULMONARY EMBOLISM-  HISTORY: tachycardia, rule out PE not on anticoagulation hx of afib, history of stage IIIa adenocarcinoma of the right lung.  COMPARISON: June 11, 2024.  TECHNIQUE: The patient was injected with  IV contrast. Axial images were obtained through the chest in a CTA/ PE protocol. 3 D Reconstruction images were also performed. This study was performed with techniques to keep radiation doses as low as reasonably achievable, (ALARA). Individualized dose reduction techniques using automated exposure control or adjustment of mA and/or kV according to the patient size were employed.  FINDINGS: There is no axillary adenopathy. There is no hilar or mediastinal adenopathy.  The heart is proper size. There is no pericardial or pleural effusion. There is mild emphysematous change. No filling defects are identified to suggest PE. There is minimal dilatation of the ascending aorta up to 41 mm, stable from prior. Cannot exclude dissection secondary to timing of the contrast bolus.. Limited images of the upper abdomen demonstrate partial visualization of the gallbladder which appears normal. The residual mass/central right upper lobe airspace disease is stable from the prior exam. Multiple air bronchograms noted in this region, stable. There are new, faint, peripheral groundglass opacities suggesting pneumonia, possibly viral. Vascular calcifications noted. Streak artifact from patient's arm position noted.      Impression: No evidence of pulmonary embolism.  Suspected pneumonia, possibly viral.   CTDI: 8.21 mGy DLP:737.75 mGy.cm  This report was signed and finalized on 8/4/2024 12:11 PM by Rebecca Godwin MD.      XR Chest 1 View    Result Date: 8/4/2024  PROCEDURE: XR CHEST 1 VW-  HISTORY: Weak/Dizzy/AMS triage protocol, penile  bleeding for 4 to 5 months.  COMPARISON: June 11, 2024.  FINDINGS: The heart is normal in size. Left lung is clear. There is right perihilar mass/airspace disease and linear densities which are stable from prior exam.. The mediastinum is unremarkable. There is no pneumothorax.  There are no acute osseous abnormalities. Tortuosity of the thoracic aorta noted. Apical lordotic positioning noted. Aortic mural calcifications noted.      Impression: Stable chest..     This report was signed and finalized on 8/4/2024 10:15 AM by Rebecca Godwin MD.           Current medications:  Scheduled Meds:aspirin, 81 mg, Oral, Daily  atorvastatin, 80 mg, Oral, Nightly  insulin lispro, 2-7 Units, Subcutaneous, 4x Daily AC & at Bedtime  levothyroxine, 50 mcg, Oral, Q AM  nicotine, 1 patch, Transdermal, Q24H  pantoprazole, 40 mg, Oral, Q AM  sodium chloride, 10 mL, Intravenous, Q12H  vitamin B-12, 1,000 mcg, Oral, Daily      Continuous Infusions:[Held by provider] heparin, 12 Units/kg/hr, Last Rate: Stopped (08/04/24 1845)  Pharmacy to Dose Heparin,   sodium chloride, 100 mL/hr      PRN Meds:.  acetaminophen **OR** acetaminophen **OR** acetaminophen    senna-docusate sodium **AND** polyethylene glycol **AND** bisacodyl **AND** bisacodyl    dextrose    dextrose    glucagon (human recombinant)    HYDROcodone-acetaminophen    ipratropium-albuterol    Morphine **AND** naloxone    nicotine polacrilex    Pharmacy to Dose Heparin    sodium chloride    sodium chloride    Assessment & Plan   Assessment & Plan     Active Hospital Problems    Diagnosis  POA    **Gross hematuria [R31.0]  Yes    Transient ischemic attack (TIA) [G45.9]  Yes    Bladder mass [N32.89]  Yes    Acute UTI (urinary tract infection) [N39.0]  Yes    Bilateral pneumonia [J18.9]  Yes    Abdominal aortic aneurysm (AAA) without rupture [I71.40]  Yes    History of oropharyngeal cancer [Z85.819]  Yes    History of lung cancer [Z85.118]  Not Applicable    History of prostate cancer  [Z85.46]  Not Applicable    Physical deconditioning [R53.81]  Yes    History of laminectomy [Z98.890]  Not Applicable    Anemia [D64.9]  Yes    Cigarette smoker [F17.210]  Yes    CAD (coronary artery disease) [I25.10]  Yes    Primary hypertension [I10]  Yes    Type 2 diabetes mellitus with diabetic polyneuropathy, without long-term current use of insulin [E11.42]  Yes    Atrial fibrillation with rapid ventricular response [I48.91]  Yes      Resolved Hospital Problems   No resolved problems to display.        Brief Hospital Course to date:  Stephane Noe is a 73 y.o. male with past medical history significant for atrial fib (prior on Eliquis but discontinued 4-5 months ago due to recurrent hematuria episodes), HTN, HLD, CAD, COPD and ongoing tobacco use, DM type II, prostate cancer, right lung cancer s/p XRT, AAA repair, recent extensive hospitalization at [facility] x 11 weeks due to LAY, required bilateral nephrostomy tubes for hydronephrosis and pneumonia.  Treated for Legionella at that time with azithro.  Developed gross hematuria again on 8/3 that remained persistent and presented first to [facility] then transferred here with concern for possible stroke.       While in the ED, post CT of the abdomen patient developed aphasia and code stroke was called.  Also found to be in A-fib RVR with hypotension.  Started on heparin drip and diltiazem in ER.  Given vancomycin and Zosyn.  Dr. Mayfield with neurology at [facility] spoke with [facility] stroke team.  They also spoke with Dr. Jernigan with urology regarding new bladder mass and hematuria, and Dr. Espinoza with cardiology regarding A-fib RVR.  Agreed to transfer to [facility] for higher level of care.     Assessment/Plan:     Gross hematuria  Bladder mass with hydro  Hx recent LAY and hydronephrosis s/p bilateral nephrostomy tubes Hx prostate cancer s/p radiation   -Urology recs appreciated.  Cont CBI for now.    -Hold Heparin gtt.    -Continue Rocephin  -Urine Cx neg  -Blood Cx neg  -Cr trending down. Give a bit more IVF today     Aphasia, resolved  Rule out CVA, TIA  --Seen by Tele-Neurologist Dr Mayfield at [facility]  -- Patient not a candidate for any acute thrombolytic therapy or any acute thrombectomy as his NIH stroke scale is 0   --CTA head/neck negative  --Stroke team following.  --MRI brain with symmetric nonspecific decreased signal intensity within the basal ganglia.  --Echo EF 47%, neg saline test     Hx of HTN now with hypotension  --Hold BP meds  --Monitor closely     A-fib RVR  Hx HTN/HLD/CAD  --Per cards recs, cont metoprolol po.    --HR remains controlled currently  --Heparin gtt HELD due to gross hematuria.  Restart ASA  --Echo neg saline test--MRI not concerning for cardioembolic process.    --Cont to hold anticoagulation for now due to hematuria     Bilateral pneumonia  Recent Urine Legionella POSITIVE (6/11/2024)  --Per CTA chest.  Recent hx of PNA and was treated.  No signs or sx of active infection.  --Still + for legionella, but no sx.  Monitor off abx.   --PRN nebs     Hx AAA  -- Reports had surgery several years ago.  -- CT A/P today [facility] shows stable appearing infra renal abdominal aortic aneurysm with extension into the common iliac arteries.     Hx DM type II (A1c from 7/30/2024 was 9.2)  -- Reports now diet controlled, not on any meds  --LDSSI for now      COPD  Chronic tobacco use  --nicotine patch, assist/advise  --oxygen as needed      ETOH use  --reports one shot of whiskey daily   --CIWA, if starts scoring, will add PRN's   --monitor    Hypothyroidism  --TSH is elevated-but trending in the right direction.  Per pharmacy fill records it looks like this was just started on 8/1.  Continue current dose of Synthroid.    Expected Discharge Location and Transportation: home  Expected Discharge   Expected Discharge Date: 8/8/2024; Expected Discharge Time:      VTE Prophylaxis:  Pharmacologic & mechanical VTE prophylaxis  orders are present.         AM-PAC 6 Clicks Score (PT): 20 (08/05/24 2000)    CODE STATUS:   Code Status and Medical Interventions: CPR (Attempt to Resuscitate); Full Support   Ordered at: 08/04/24 1722     Level Of Support Discussed With:    Patient     Code Status (Patient has no pulse and is not breathing):    CPR (Attempt to Resuscitate)     Medical Interventions (Patient has pulse or is breathing):    Full Support       yRlie Steinberg MD  08/06/24

## 2024-08-06 NOTE — PLAN OF CARE
Goal Outcome Evaluation:  Plan of Care Reviewed With: patient                  Anticipated Discharge Disposition (SLP): other (see comments) (do not suspect pt will agree to further SLP services.)    SLP Diagnosis: cognitive-linguistic disorder (08/06/24 0945)     SLP Swallowing Diagnosis: unable/unwilling to cooperate, suspect chronic, pharyngeal dysphagia (08/06/24 0945)         Hx dysphagia/aspiration at [place] earlier this year. Advanced from NPO to puree/honey-thick liquids and suspect self-advanced once home. Pt refusing MBS/FEES. Denies dysphagia. MD in agreement for SLP to monitor and if any increased acute concerns dysphagia, will re-visit MBS with pt.

## 2024-08-06 NOTE — CASE MANAGEMENT/SOCIAL WORK
Discharge Planning Assessment  [facility]     Patient Name: Stephane Noe  MRN: [id number]  Today's Date: 8/6/2024    Admit Date: 8/4/2024    Plan: Home with Home Health   Discharge Needs Assessment       Row Name 08/06/24 0951       Living Environment    People in Home alone    Current Living Arrangements home    Primary Care Provided by self    Provides Primary Care For no one    Family Caregiver if Needed child(yakov), adult    Family Caregiver Names Neela Meyers [phone]    Quality of Family Relationships supportive    Able to Return to Prior Arrangements yes       Transition Planning    Patient/Family Anticipates Transition to home    Transportation Anticipated family or friend will provide       Discharge Needs Assessment    Readmission Within the Last 30 Days no previous admission in last 30 days    Equipment Currently Used at Home cane, straight;wheelchair                   Discharge Plan       Row Name 08/06/24 0953       Plan    Plan Home with Home Health    Patient/Family in Agreement with Plan yes    Plan Comments Spoke with Mr. Noe at the bedside. He lives alone in [facility]. His Daughter, Neela, assists if needed. He is independent with ADL's. He has a cane and wheelchair, but does not use. He does not use any oxygen. He is current with [facility] for SN, PT and OT.  He does not have advance directives. His PCP is Nain Morel and he has Medicare A and B and [employer] [place] [employer] insurance. Discharge plan is home with [facility]. CM will continue to follow for discharge needs.    Final Discharge Disposition Code 06 - home with home health care                  Continued Care and Services - Admitted Since 8/4/2024    No active coordination exists for this encounter.       Expected Discharge Date and Time       Expected Discharge Date Expected Discharge Time    Aug 8, 2024            Demographic Summary       Row Name 08/06/24 0949       General Information    Admission Type inpatient     Arrived From home    Referral Source admission list    Reason for Consult discharge planning    Preferred Language English       Contact Information    Permission Granted to Share Info With                    Functional Status       Row Name 08/06/24 0951       Functional Status, IADL    Medications independent    Meal Preparation independent    Housekeeping independent    Laundry independent    Shopping independent       Mental Status    General Appearance WDL WDL                   Psychosocial    No documentation.                  Abuse/Neglect    No documentation.                  Legal    No documentation.                  Substance Abuse    No documentation.                  Patient Forms    No documentation.                     Krista Desai RN

## 2024-08-06 NOTE — PROGRESS NOTES
Stroke Progress Note       Chief Complaint: Transient aphasia    Subjective    Subjective     Subjective:  Patient was seen and examined in his room, no acute distress resting comfortably in bed,  nurse assistant at the bedside for bladder irrigation that was noted was tinged blood urine. patient is alert and oriented answer questions appropriately.  Making jokes, denies any complaints at this time.  Per primary RN NIH is 0 no focal neurological symptoms, no acute events overnight.  Soft blood pressure is noted on the monitor 95/74 however patient denies any lightheadedness or headache.    Review of Systems   HENT:  Negative for drooling and tinnitus.    Respiratory:  Negative for shortness of breath.    Cardiovascular:  Negative for chest pain.   Gastrointestinal:  Negative for abdominal pain and nausea.   Genitourinary:  Positive for hematuria.   Neurological:  Negative for dizziness, speech difficulty, weakness, light-headedness and numbness.   Hematological:  Does not bruise/bleed easily.   Psychiatric/Behavioral:  Positive for agitation.             Objective    Objective      Temp:  [97.3 °F (36.3 °C)-98.4 °F (36.9 °C)] 98.4 °F (36.9 °C)  Heart Rate:  [] 89  Resp:  [17-19] 18  BP: ()/(51-74) 95/74        Neurological Exam  Mental Status  Alert. Oriented to person, place and time. Oriented to person, place, and time. Recent and remote memory are intact. Speech is normal. Language is fluent with no aphasia. Attention and concentration are normal.    Cranial Nerves  CN II: Right visual acuity: Normal. Left visual acuity: Normal. Right normal visual field. Left normal visual field.  CN III, IV, VI: Extraocular movements intact bilaterally. Normal lids and orbits bilaterally. Pupils equal round and reactive to light bilaterally.  CN V: Facial sensation is normal.  CN VII: Full and symmetric facial movement.  CN VIII: Intact hearing bilateral.  CN IX, X: Palate elevates symmetrically    Motor  Normal  muscle bulk throughout. No fasciculations present. Normal muscle tone. No abnormal involuntary movements. Strength is 5/5 throughout all four extremities.    Sensory  Light touch is normal in upper and lower extremities.  No right-sided hemispatial neglect. No left-sided hemispatial neglect. Right extinction absent: Left extinction absent:    Reflexes  Right Plantar: downgoing  Left Plantar: downgoing    Coordination    Patient declined to participate.    Gait      Unable to assess.      Physical Exam  HENT:      Head: Normocephalic and atraumatic.      Mouth/Throat:      Mouth: Mucous membranes are moist.   Eyes:      General: Lids are normal.      Extraocular Movements: Extraocular movements intact.      Pupils: Pupils are equal, round, and reactive to light.   Cardiovascular:      Rate and Rhythm: Normal rate. Rhythm irregular.   Pulmonary:      Effort: Pulmonary effort is normal.      Comments:  Breathing comfortable on room air  Abdominal:      General: Abdomen is flat.   Musculoskeletal:         General: Normal range of motion.      Cervical back: Normal range of motion.   Skin:     General: Skin is warm and dry.      Capillary Refill: Capillary refill takes less than 2 seconds.   Neurological:      General: No focal deficit present.      Mental Status: He is alert and oriented to person, place, and time. Mental status is at baseline.      Motor: Motor strength is normal.  Psychiatric:         Mood and Affect: Mood normal.         Speech: Speech normal.         Behavior: Behavior normal.         Thought Content: Thought content normal.         Judgment: Judgment normal.         Results Review:    I reviewed the patient's new clinical results.    Results for orders placed during the hospital encounter of 08/04/24    Adult Transthoracic Echo Complete W/ Cont if Necessary Per Protocol    Interpretation Summary    Left ventricular systolic function is low normal. Calculated left ventricular EF = 47.9% Left  ventricular ejection fraction appears to be 46 - 50%.    Saline test results are negative.  Normal bubble study.    Moderate mitral valve regurgitation is present.    Biatrial enlargement noted.  Correlate with any other signs of amyloidosis.    The right ventricular cavity moderately is dilated.          MRI Brain With & Without Contrast    Result Date: 8/5/2024  MRI BRAIN W WO CONTRAST Date of Exam: 8/4/2024 10:32 PM EDT Indication: Stroke, follow up stroke workup, possible bladder CA.  Comparison: 12/12/2019 Technique:  Routine multiplanar/multisequence sequence images of the brain were obtained before and after the uneventful administration of 10 cc Multihance. Findings: There is no diffusion restriction to suggest acute infarct. There is no evidence of acute or chronic intracranial hemorrhage. No mass effect or midline shift. There is generalized cerebral atrophy. No abnormal extra-axial collections. Minimal progression of mild to moderate nonspecific white matter signal abnormality without mass effect nor enhancement suggestive of chronic microvascular ischemic disease. Symmetric nonspecific decreasing signal intensity within the basal ganglia which  may be senescent in etiology. Midline structures are intact. No significant cortical abnormality is identified. Calvarial and superficial soft tissue signal is within normal limits. Orbits appear unremarkable. The paranasal sinuses and the mastoid air cells appear well aerated. There is no abnormal intracranial enhancement. There is normal enhancement within the intracranial vasculature including the dural venous sinuses.     Impression: Impression: 1.No evidence of acute intracranial process nor metastatic disease. 2.Minimal progression of nonspecific white matter changes likely related to microvascular ischemic disease. 3.Symmetric nonspecific decreased signal intensity within the basal ganglia which can be senescent in nature. Electronically Signed: Steve  MD Nata  8/5/2024 10:30 AM EDT  Workstation ID: [id number] this is a 73-year-old old  MRI Brain With & Without Contrast    Result Date: 8/5/2024  Impression: 1.No evidence of acute intracranial process nor metastatic disease. 2.Minimal progression of nonspecific white matter changes likely related to microvascular ischemic disease. 3.Symmetric nonspecific decreased signal intensity within the basal ganglia which can be senescent in nature. Electronically Signed: Steve Peace MD  8/5/2024 10:30 AM EDT  Workstation ID: [id number]     8/.4/2024  CTA head and neck bilateral ICA less than 50% stenosis with no flow-limiting or LVO  Labs reviewed and noted for  Sodium 139  Potassium 4  BUN 15  Creatinine 1.42  GFR 51.9  Glucose 115  WBC 7.80  H&H 8.4\27.0  Platelet 161  LDL 57  HDL 42  Assessment/Plan     Assessment/Plan:    This is a 73-year-old white male, right-handed with multiple vascular risk factor, who is being followed by the stroke service for transient aphasia concern for TIA.      Antiplatelet PTA: None   Anticoagulant PTA: Eliquis      Transient aphasia   suspect TIA left hemisphere   -Continue TIA\ischemic stroke without IV thrombolytic therapy   -Patient is not a candidate for IV thrombolytic therapy secondary to resolution of symptoms  -CTA head and neck remarkable for atherosclerosis, bilateral ICA less than 50% stenosis.  -MRI remarkable for no acute abnormality, no metastatic disease, microvascular disease, no acute infarct.  -Echo remarkable for HFrEF mild, saline test are negative, normal bubble study. Moderate mitral valve regurgitation is present.  Biatrial enlargement noted.  Correlate with any other signs of amyloidosis. patient might need CLARE for further evaluation in the future.  Management by medicine team.  -Patient's Eliquis, no current anticoagulation therapy for his A-fib secondary to hematuria, managed by medicine team\urology recommendation. Currently on ASA 81mg PO daily.  -Resume  Eliquis and aspirin patient has atherosclerosis intracranial extracranial/for A-fib, secondary stroke prevention, when appropriate. will follow medicine team recommendation.  -To be resumed when appropriate.  -Continue NIH\neurocheck per protocol  -Continue PT\OT\SLP.  -Stroke neurology will continue to follow-up    History of hypertension\hypertension  -Blood pressure is on hold at this time patient has soft blood pressure however asymptomatic.  -Management by medicine team    A-fib with RVR currently off anticoagulation therapy secondary to bleeding.  -Heparin drip currently on hold secondary to gross hematuria.  -Management by medicine team.    Diabetes type 2 controlled.  -Reported controlled diabetes with diet no current medication.  -Management by medicine team    COPD  -Management by medicine team    Chronic tobacco use  -Counseling on cessation  -Nicotine replacement management by medicine team    Alcohol use  -Counseling on cessation  -Management by medicine team    Bladder mass associated with gross hematuria  -Management by urology team\medicine team.  Thank    Normocytic anemia complicated by ABLA  - h/h 8.4/27.0 trending labs and monitor  -Reported gross hematuria, patient off oral anticoagulation therapy.  -Management by medicine team\urology team     Hemorrhagic disorder secondary to circulating anticoagulation therapy.  /Coagulopathy.  -Currently off all anticoagulation therapy heparin is on hold to elucidation of hematuria, management by medicine team.  -Monitor signs and symptoms of bleed.  -Trending labs and monitor.    Hyperlipidemia  -Well-controlled no medication however would recommend high intensity statin for secondary stroke prevention.  Continue high intensity statin atorvastatin 80 mg p.o. at night and daily for secondary stroke prevention.  Continue    I discussed plan of care with patient, primary RN.  Neurologist will continue to follow.  Thank you for the grasp of this.  Patient  brian Ribeiro, MARCEL  08/06/24  14:04 EDT

## 2024-08-07 LAB
ANION GAP SERPL CALCULATED.3IONS-SCNC: 12 MMOL/L (ref 5–15)
BUN SERPL-MCNC: 14 MG/DL (ref 8–23)
BUN/CREAT SERPL: 11.9 (ref 7–25)
CALCIUM SPEC-SCNC: 8.6 MG/DL (ref 8.6–10.5)
CHLORIDE SERPL-SCNC: 108 MMOL/L (ref 98–107)
CO2 SERPL-SCNC: 20 MMOL/L (ref 22–29)
CREAT SERPL-MCNC: 1.18 MG/DL (ref 0.76–1.27)
EGFRCR SERPLBLD CKD-EPI 2021: 64.8 ML/MIN/1.73
GLUCOSE BLDC GLUCOMTR-MCNC: 112 MG/DL (ref 70–130)
GLUCOSE BLDC GLUCOMTR-MCNC: 120 MG/DL (ref 70–130)
GLUCOSE BLDC GLUCOMTR-MCNC: 158 MG/DL (ref 70–130)
GLUCOSE BLDC GLUCOMTR-MCNC: 92 MG/DL (ref 70–130)
GLUCOSE SERPL-MCNC: 86 MG/DL (ref 65–99)
MAGNESIUM SERPL-MCNC: 1.6 MG/DL (ref 1.6–2.4)
POTASSIUM SERPL-SCNC: 4.1 MMOL/L (ref 3.5–5.2)
SODIUM SERPL-SCNC: 140 MMOL/L (ref 136–145)

## 2024-08-07 PROCEDURE — 97110 THERAPEUTIC EXERCISES: CPT

## 2024-08-07 PROCEDURE — 99232 SBSQ HOSP IP/OBS MODERATE 35: CPT

## 2024-08-07 PROCEDURE — 83735 ASSAY OF MAGNESIUM: CPT | Performed by: NURSE PRACTITIONER

## 2024-08-07 PROCEDURE — 97116 GAIT TRAINING THERAPY: CPT

## 2024-08-07 PROCEDURE — 80048 BASIC METABOLIC PNL TOTAL CA: CPT | Performed by: NURSE PRACTITIONER

## 2024-08-07 PROCEDURE — 82948 REAGENT STRIP/BLOOD GLUCOSE: CPT

## 2024-08-07 PROCEDURE — 99232 SBSQ HOSP IP/OBS MODERATE 35: CPT | Performed by: PEDIATRICS

## 2024-08-07 RX ORDER — ATORVASTATIN CALCIUM 40 MG/1
40 TABLET, FILM COATED ORAL NIGHTLY
Status: DISCONTINUED | OUTPATIENT
Start: 2024-08-07 | End: 2024-08-11 | Stop reason: HOSPADM

## 2024-08-07 RX ADMIN — ATORVASTATIN CALCIUM 40 MG: 40 TABLET, FILM COATED ORAL at 20:32

## 2024-08-07 RX ADMIN — Medication 10 ML: at 08:46

## 2024-08-07 RX ADMIN — ASPIRIN 81 MG: 81 TABLET, COATED ORAL at 08:45

## 2024-08-07 RX ADMIN — LEVOTHYROXINE SODIUM 50 MCG: 0.05 TABLET ORAL at 05:17

## 2024-08-07 RX ADMIN — PANTOPRAZOLE SODIUM 40 MG: 40 TABLET, DELAYED RELEASE ORAL at 05:17

## 2024-08-07 RX ADMIN — CYANOCOBALAMIN TAB 1000 MCG 1000 MCG: 1000 TAB at 08:45

## 2024-08-07 RX ADMIN — Medication 10 ML: at 20:32
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
## 2024-08-07 NOTE — PROGRESS NOTES
Stroke Progress Note       Chief Complaint: Transient aphasia    Subjective    Subjective     Subjective:  No acute events overnight.  Patient is currently resting in the bed.  He tells me he has not had any recurrent stroke symptoms and does not remember having transient aphasia at OSH.  He currently has continuous bladder irrigation which nursing reports hematuria has decreased.  He will likely be ready for discharge within the next few days.        Objective    Objective      Temp:  [97.8 °F (36.6 °C)-98.5 °F (36.9 °C)] 98.1 °F (36.7 °C)  Heart Rate:  [] 99  Resp:  [18-22] 18  BP: ()/(61-75) 109/75        Neurological Exam  Mental Status  Alert. Oriented to person, place, time and situation. Oriented to person, place, and time. Speech is normal. Language is fluent with no aphasia. Attention and concentration are normal.    Cranial Nerves  CN II: Visual fields full to confrontation.  CN III, IV, VI: Extraocular movements intact bilaterally. Pupils equal round and reactive to light bilaterally.  CN V: Facial sensation is normal.  CN VII: Full and symmetric facial movement.  CN VIII: Hearing appears to be intact bilaterally.  CN XII: Tongue midline without atrophy or fasciculations.    Motor  Normal muscle bulk throughout. Normal muscle tone. Strength is 5/5 throughout all four extremities.    Sensory  Sensation is intact to light touch, pinprick, vibration and proprioception in all four extremities.    Gait    Not observed.      Physical Exam  Vitals and nursing note reviewed.   Constitutional:       General: He is not in acute distress.     Appearance: He is ill-appearing.   HENT:      Head: Normocephalic and atraumatic.      Mouth/Throat:      Mouth: Mucous membranes are dry.   Eyes:      Extraocular Movements: Extraocular movements intact.      Pupils: Pupils are equal, round, and reactive to light.   Cardiovascular:      Rate and Rhythm: Normal rate. Rhythm irregular.   Pulmonary:      Effort:  Pulmonary effort is normal. No respiratory distress.      Comments: On room air  Musculoskeletal:      Right lower leg: No edema.      Left lower leg: No edema.   Skin:     General: Skin is warm and dry.      Coloration: Skin is pale.   Neurological:      General: No focal deficit present.      Mental Status: He is alert and oriented to person, place, and time.      Motor: Motor strength is normal.  Psychiatric:         Mood and Affect: Mood normal.         Speech: Speech normal.         Behavior: Behavior normal.         Results Review:    I reviewed the patient's new clinical results.    MRI of the brain without contrast is negative for acute stroke.    CTA head/neck is negative for flow-limiting stenosis or LVO.    Results for orders placed during the hospital encounter of 08/04/24    Adult Transthoracic Echo Complete W/ Cont if Necessary Per Protocol    Interpretation Summary    Left ventricular systolic function is low normal. Calculated left ventricular EF = 47.9% Left ventricular ejection fraction appears to be 46 - 50%.    Saline test results are negative.  Normal bubble study.    Moderate mitral valve regurgitation is present.    Biatrial enlargement noted.  Correlate with any other signs of amyloidosis.    The right ventricular cavity moderately is dilated.    Creatinine 1.18, BUN 14  Sodium 140  WBC 7.80  H/H 8.4/27.0 (on admission was 10.5/32.6) platelets 161  LDL 57  A1c 5.2  AST 11  ALT <5      Assessment/Plan     Assessment/Plan:    This is a 73-year-old white male with known medical diagnoses of atrial fibrillation (previously on Eliquis however discontinued 3/2024 for hematuria), CAD s/p KALEB (2011), essential hypertension, hyperlipidemia, DM 2, metastatic cancer, PVD, obesity, EtOH use and ongoing tobacco abuse who presented to [facility] as a transfer from [facility] on 8/4/2020 for further evaluation of transient aphasia prompting a code stroke to be initiated.  He was  evaluated by Dr. Mayfield, teleneurology, who evaluated him and scored him a 0 on the NIHSS.  He was not a candidate for IV thrombolytic therapy given nondisabling symptoms/return to baseline and was on candidate for emergent endovascular therapy as CTA head/neck was negative for flow-limiting stenosis or LVO.  The patient was started on a heparin drip, no bolus ever, for his atrial fibrillation prior to transfer to our facility for higher level of care    Antiplatelet PTA: None   Anticoagulant PTA: Eliquis      Transient aphasia; cannot completely exclude TIA however patient was noted to be in A-fib RVR (heart rate 140-160), hypotensive (93/75) as well as anemic 2/2 hematuria which could have contributed to symptoms  -TIA/CVA order set without thrombolytic therapy as currently in place; ABCD2 score 2 (low risk)  -CTA head and neck remarkable for atherosclerosis, bilateral ICA less than 50% stenosis.  -MRI brain without contrast is negative for acute stroke or metastatic disease  -Okay to continue ASA 81 mg for now  -PT/OT continue to follow when they recommend outpatient therapy at discharge.  Case management following for discharge needs.  -Activity as tolerated, fall risk precautions  -Follow-up in stroke clinic in 6 weeks (added to ADT)    2.   Atrial fibrillation  -Heparin drip was discontinued 2/2 hematuria  -Rate control per hospitalist  -Will need to consider initiation of OAC versus SHAUN closure device given persistent A-fib; patient to follow-up with Dr. Espinoza as outpatient once hematuria has resolved.  [id number] score 4 indicating 4.8% stroke risk per year without OAC, HAS-BLED score 6 indicating very high risk of major bleeding  -ASA does provide some protection against embolic events from atrial fibrillation however patient is still high risk for a secondary event    3.  Essential hypertension  -Okay for normal blood pressure parameters  -Management per primary team    4.  Hyperlipidemia  -LDL 52  -Okay  to decrease atorvastatin 40 mg given LDL at goal at this (<70)    5.  Diabetes type 2 controlled.  -A1c in admission 5.2, goal <7  -Maintain euglycemia  -Management per hospitalist    6.  Ongoing tobacco use  -Patient encouraged on the importance of tobacco cessation as this increases risk of worsening pulmonary disease/cancer as well as future cardiovascular and cerebrovascular events; patient is unsure if he can completely stop but is agreeable to try and cut back  -Nicotine replacement management by medicine team    7.  EtOH use  -Courage patient to stop drinking given increased risk of bleeding and current hematuria, patient voices his understanding  -CIWA protocol in place per primary team    8. Hematuria, improving  -Management by urology team\medicine team; patient has hx of prostate cancer s/p radiation treatment  -Felt to be 2/2 infection and hx of radiation therapy  -Continuous bladder irrigation in place      Plan of care was discussed with patient, Dr. Yang, and primary team.  From a neurological standpoint the patient remains stable.  We will sign off for now but will be available if needed.     Discussed the importance of medication compliance Aspirin 81mg daily and Atorvastatin 40mg nightly and lifestyle modifications adequate control of blood pressure, adequate control of cholesterol (goal LDL <70), smoking cessation, ETOH cessation, and increased physical activity to help reduce the risk of future cerebrovascular events.  Also discussed the signs symptoms that would warrant the patient return back to the emergency department including unilateral weakness, unilateral numbness, visual disturbances, loss of balance, speech difficulties, and/or a sudden severe headache.  Patient verbalizes his understanding.      Fbay Webber, MARCEL  08/07/24  09:49 EDT

## 2024-08-07 NOTE — PROGRESS NOTES
[facility]  PROGRESS NOTE    Patient Name: Stephane Noe  : 1950  MRN: [id number]    Date of Admission: 2024  Primary Care Physician: Nain Morel MD    Subjective   Subjective     CC:  Hematuria    HPI:  No complaints today.  Eating well.  Still on CBI with hematuria.  Denies any pain.    Objective   Objective     Vital Signs:   Temp:  [97.8 °F (36.6 °C)-98.5 °F (36.9 °C)] 98 °F (36.7 °C)  Heart Rate:  [] 88  Resp:  [18-22] 18  BP: ()/(61-75) 120/70     Physical Exam:  Constitutional: No acute distress, awake, alert  HENT: NCAT, mucous membranes moist  Respiratory: Clear to auscultation bilaterally, respiratory effort normal   Cardiovascular: irregular rate and rhythm-controlled, no murmurs, rubs, or gallops  Gastrointestinal: Positive bowel sounds, soft, NT, nondistended, Guaman in place with hematuria present  Musculoskeletal: No bilateral ankle edema  Psychiatric: Appropriate affect, cooperative  Neurologic: Oriented x 3, strength symmetric in all extremities, Cranial Nerves grossly intact to confrontation, speech clear  Skin: No rashes      Results Reviewed:  LAB RESULTS:      Lab 24  0756 24  1126 24  1647 24  1341 24  1250 24  0950   WBC 7.80 7.83 8.18  --   --  8.35   HEMOGLOBIN 8.4* 8.5* 9.5*  --   --  10.5*   HEMATOCRIT 27.0* 27.0* 30.2*  --   --  32.6*   PLATELETS 161 159 197  --   --  223   NEUTROS ABS 5.81 6.37 6.56  --   --  5.70   IMMATURE GRANS (ABS) 0.06* 0.04 0.06*  --   --  0.08*   LYMPHS ABS 1.23 0.78 0.86  --   --  1.76   MONOS ABS 0.56 0.58 0.64  --   --  0.66   EOS ABS 0.10 0.04 0.03  --   --  0.10   .5* 104.7* 102.4*  --   --  102.2*   SED RATE  --  40*  --   --   --   --    PROCALCITONIN  --   --   --   --   --  0.23   LACTATE  --   --  1.7  --  3.5*  --    PROTIME  --   --   --   --   --  13.3   APTT  --   --   --   --   --  31.3*   HEPARIN ANTI-XA  --   --   --  0.10*  --    --          Lab 08/07/24  0632 08/06/24  0756 08/05/24  1126 08/04/24  1647 08/04/24  0950   SODIUM 140 139 137 134* 134*   POTASSIUM 4.1 4.0 4.5 4.8 4.6   CHLORIDE 108* 107 107 104 102   CO2 20.0* 24.0 20.0* 19.0* 18.5*   ANION GAP 12.0 8.0 10.0 11.0 13.5   BUN 14 15 16 19 19   CREATININE 1.18 1.42* 1.50* 1.38* 1.48*   EGFR 64.8 51.9* 48.6* 53.7* 49.3*   GLUCOSE 86 73 113* 82 85   CALCIUM 8.6 8.3* 8.3* 8.2* 9.5   MAGNESIUM 1.6 1.8  --   --  1.8   TSH  --   --  11.530*  --   --          Lab 08/04/24  1647 08/04/24  0950   TOTAL PROTEIN 5.5* 6.9   ALBUMIN 2.6* 3.4*   GLOBULIN 2.9 3.5   ALT (SGPT) <5 5   AST (SGOT) 11 12   BILIRUBIN 0.4 0.2   ALK PHOS 75 97         Lab 08/04/24  1647 08/04/24  1211 08/04/24  0950   HSTROP T 87* 95* 91*   PROTIME  --   --  13.3   INR  --   --  0.97         Lab 08/05/24  1126   CHOLESTEROL 117   LDL CHOL 57   HDL CHOL 42   TRIGLYCERIDES 96         Lab 08/05/24  1126 08/04/24  1258   FOLATE 8.30  --    VITAMIN B 12 602  --    ABO TYPING  --  A   RH TYPING  --  Positive   ANTIBODY SCREEN  --  Negative         Brief Urine Lab Results  (Last result in the past 365 days)        Color   Clarity   Blood   Leuk Est   Nitrite   Protein   CREAT   Urine HCG        08/04/24 1717 Red   Turbid   Large (3+)   Moderate (2+)   Positive   >=300 mg/dL (3+)                   Microbiology Results Abnormal       Procedure Component Value - Date/Time    Urine Culture - Urine, Urine, Clean Catch [[id number]] Collected: 08/04/24 1717    Lab Status: Final result Specimen: Urine, Clean Catch Updated: 08/05/24 2051     Urine Culture <10,000 CFU/mL Normal Urogenital Jayne    Narrative:      Colonization of the urinary tract without infection is common. Treatment is discouraged unless the patient is symptomatic, pregnant, or undergoing an invasive urologic procedure.    S. Pneumo Ag Urine or CSF - Urine, Urine, Clean Catch [[id number]]  (Normal) Collected: 08/04/24 9331    Lab Status: Final result Specimen: Urine,  Clean Catch Updated: 08/04/24 2155     Strep Pneumo Ag Negative    MRSA Screen, PCR (Inpatient) - Swab, Nares [[id number]]  (Normal) Collected: 08/04/24 1820    Lab Status: Final result Specimen: Swab from Nares Updated: 08/04/24 1935     MRSA PCR Negative    Narrative:      The negative predictive value of this diagnostic test is high and should only be used to consider de-escalating anti-MRSA therapy. A positive result may indicate colonization with MRSA and must be correlated clinically.  MRSA Negative    Respiratory Panel PCR w/COVID-19(SARS-CoV-2) MORALES/DUNG/ERWIN/PAD/COR/ANDIE In-House, NP Swab in UTM/VTM, 2 HR TAT - Swab, Nasopharynx [[id number]]  (Normal) Collected: 08/04/24 1716    Lab Status: Final result Specimen: Swab from Nasopharynx Updated: 08/04/24 1804     ADENOVIRUS, PCR Not Detected     Coronavirus 229E Not Detected     Coronavirus HKU1 Not Detected     Coronavirus NL63 Not Detected     Coronavirus OC43 Not Detected     COVID19 Not Detected     Human Metapneumovirus Not Detected     Human Rhinovirus/Enterovirus Not Detected     Influenza A PCR Not Detected     Influenza B PCR Not Detected     Parainfluenza Virus 1 Not Detected     Parainfluenza Virus 2 Not Detected     Parainfluenza Virus 3 Not Detected     Parainfluenza Virus 4 Not Detected     RSV, PCR Not Detected     Bordetella pertussis pcr Not Detected     Bordetella parapertussis PCR Not Detected     Chlamydophila pneumoniae PCR Not Detected     Mycoplasma pneumo by PCR Not Detected    Narrative:      In the setting of a positive respiratory panel with a viral infection PLUS a negative procalcitonin without other underlying concern for bacterial infection, consider observing off antibiotics or discontinuation of antibiotics and continue supportive care. If the respiratory panel is positive for atypical bacterial infection (Bordetella pertussis, Chlamydophila pneumoniae, or Mycoplasma pneumoniae), consider antibiotic de-escalation to target atypical  bacterial infection.            Adult Transthoracic Echo Complete W/ Cont if Necessary Per Protocol    Result Date: 8/6/2024    Left ventricular systolic function is low normal. Calculated left ventricular EF = 47.9% Left ventricular ejection fraction appears to be 46 - 50%.   Saline test results are negative.  Normal bubble study.   Moderate mitral valve regurgitation is present.   Biatrial enlargement noted.  Correlate with any other signs of amyloidosis.   The right ventricular cavity moderately is dilated.      Results for orders placed during the hospital encounter of 08/04/24    Adult Transthoracic Echo Complete W/ Cont if Necessary Per Protocol    Interpretation Summary    Left ventricular systolic function is low normal. Calculated left ventricular EF = 47.9% Left ventricular ejection fraction appears to be 46 - 50%.    Saline test results are negative.  Normal bubble study.    Moderate mitral valve regurgitation is present.    Biatrial enlargement noted.  Correlate with any other signs of amyloidosis.    The right ventricular cavity moderately is dilated.      Current medications:  Scheduled Meds:aspirin, 81 mg, Oral, Daily  atorvastatin, 40 mg, Oral, Nightly  insulin lispro, 2-7 Units, Subcutaneous, 4x Daily AC & at Bedtime  levothyroxine, 50 mcg, Oral, Q AM  nicotine, 1 patch, Transdermal, Q24H  pantoprazole, 40 mg, Oral, Q AM  sodium chloride, 10 mL, Intravenous, Q12H  vitamin B-12, 1,000 mcg, Oral, Daily      Continuous Infusions:     PRN Meds:.  acetaminophen **OR** acetaminophen **OR** acetaminophen    senna-docusate sodium **AND** polyethylene glycol **AND** bisacodyl **AND** bisacodyl    dextrose    dextrose    glucagon (human recombinant)    HYDROcodone-acetaminophen    ipratropium-albuterol    Morphine **AND** naloxone    nicotine polacrilex    sodium chloride    sodium chloride    Assessment & Plan   Assessment & Plan     Active Hospital Problems    Diagnosis  POA    **Gross hematuria [R31.0]   Yes    Transient ischemic attack (TIA) [G45.9]  Yes    Bladder mass [N32.89]  Yes    Acute UTI (urinary tract infection) [N39.0]  Yes    Bilateral pneumonia [J18.9]  Yes    Abdominal aortic aneurysm (AAA) without rupture [I71.40]  Yes    History of oropharyngeal cancer [Z85.819]  Yes    History of lung cancer [Z85.118]  Not Applicable    History of prostate cancer [Z85.46]  Not Applicable    Physical deconditioning [R53.81]  Yes    History of laminectomy [Z98.890]  Not Applicable    Anemia [D64.9]  Yes    Cigarette smoker [F17.210]  Yes    CAD (coronary artery disease) [I25.10]  Yes    Primary hypertension [I10]  Yes    Type 2 diabetes mellitus with diabetic polyneuropathy, without long-term current use of insulin [E11.42]  Yes    Atrial fibrillation with rapid ventricular response [I48.91]  Yes      Resolved Hospital Problems   No resolved problems to display.        Brief Hospital Course to date:  Stephane Noe is a 73 y.o. male with past medical history significant for atrial fib (prior on Eliquis but discontinued 4-5 months ago due to recurrent hematuria episodes), HTN, HLD, CAD, COPD and ongoing tobacco use, DM type II, prostate cancer, right lung cancer s/p XRT, AAA repair, recent extensive hospitalization at [facility] x 11 weeks due to LAY, required bilateral nephrostomy tubes for hydronephrosis and pneumonia.  Treated for Legionella at that time with azithro.  Developed gross hematuria again on 8/3 that remained persistent and presented first to [facility] then transferred here with concern for possible stroke.       While in the ED, post CT of the abdomen patient developed aphasia and code stroke was called.  Also found to be in A-fib RVR with hypotension.  Started on heparin drip and diltiazem in ER.  Given vancomycin and Zosyn.  Dr. Mayfield with neurology at [facility] spoke with [facility] stroke team.  They also spoke with Dr. Jernigan with urology regarding new bladder mass  and hematuria, and Dr. Espinoza with cardiology regarding A-fib RVR.  Agreed to transfer to [facility] for higher level of care.     Assessment/Plan:     Gross hematuria  Bladder mass with hydro  Hx recent LAY and hydronephrosis s/p bilateral nephrostomy tubes Hx prostate cancer s/p radiation   -Urology recs appreciated.  Cont CBI for now.    -No anticoagulation for now  -Continue Rocephin  -Urine Cx neg  -Blood Cx neg  -Cr trending down    Aphasia, resolved  Rule out CVA, TIA  --Seen by Tele-Neurologist Dr Mayfield at [facility]  -- Patient not a candidate for any acute thrombolytic therapy or any acute thrombectomy as his NIH stroke scale is 0   --CTA head/neck negative  --Stroke team following.  Cont ASA for now.  --MRI brain with symmetric nonspecific decreased signal intensity within the basal ganglia.  --Echo EF 47%, neg saline test  --F/u  in 6 weekds     Hx of HTN now with hypotension  --Hold BP meds  --Monitor closely     A-fib RVR  Hx HTN/HLD/CAD  --Per cards recs, ideally would add metoprolol tartrate 12.5mg BID.    --HR remains controlled currently  --Cont ASA.  Ideally would add anticoagulation once hematuria better  --Echo neg saline test--MRI not concerning for cardioembolic process.    --Would be a good candidate for watchman procedure.  Will need follow up outpt.     Bilateral pneumonia  Recent Urine Legionella POSITIVE (6/11/2024)  --Per CTA chest.  Recent hx of PNA and was treated.  No signs or sx of active infection.  --Still + for legionella, but no sx.  Monitor off abx.   --PRN nebs     Hx AAA  -- Reports had surgery several years ago.  -- CT A/P today [facility] shows stable appearing infra renal abdominal aortic aneurysm with extension into the common iliac arteries.     Hx DM type II (A1c from 7/30/2024 was 9.2)  -- Reports now diet controlled, not on any meds  --LDSSI for now      COPD  Chronic tobacco use  --nicotine patch, assist/advise  --oxygen as needed      ETOH use  --reports one shot of  caremn daily   --EVIEWA, if starts scoring, will add PRN's   --monitor    Hypothyroidism  --TSH is elevated-but trending in the right direction.  Per pharmacy fill records it looks like this was just started on 8/1.  Continue current dose of Synthroid.    Expected Discharge Location and Transportation: home  Expected Discharge   Expected Discharge Date: 8/8/2024; Expected Discharge Time:      VTE Prophylaxis:  Mechanical VTE prophylaxis orders are present.         AM-PAC 6 Clicks Score (PT): 20 (08/07/24 0800)    CODE STATUS:   Code Status and Medical Interventions: CPR (Attempt to Resuscitate); Full Support   Ordered at: 08/04/24 1722     Level Of Support Discussed With:    Patient     Code Status (Patient has no pulse and is not breathing):    CPR (Attempt to Resuscitate)     Medical Interventions (Patient has pulse or is breathing):    Full Support       Ryile Steinberg MD  08/07/24

## 2024-08-07 NOTE — PLAN OF CARE
Goal Outcome Evaluation:  Plan of Care Reviewed With: patient        Progress: improving  Outcome Evaluation: Pt. continues to present below baseline function w/generalized weakness and decreased functional endurance affecting his ability to safely participate in functional mobility. He performed bed mobility, transfers and ambulated 140' w/front wheeled walker, contact guard assist. Activity limited by fatigue. Pt. tolerated ther-ex well. Continue acute PT POC to progress as tolerated.      Anticipated Discharge Disposition (PT): home with outpatient therapy services

## 2024-08-07 NOTE — PROGRESS NOTES
progress note      Patient has still had some intermittent hematuria, requiring CBI  He is comfortable    H&H 8.4/27  Creatinine 1.18  PSA 0.038    Manually irrigated, irrigates with these-numerous very small clots produced      Urologic impression    -Hematuria with clots  -Bladder wall thickening versus mass on initial CT scan  -History of prostate cancer status post ADT and IMRT previously.  Recent PSA 0.038  -Bilateral hydronephrosis      <Prior left percutaneous nephrostomy tube placement with antegrade stent placement      <New development of right hydronephrosis, not present at the time of previous left NT insertion       <History acute renal failure resolved with left nephrostomy tube placement      Plan/rec/discussion-I believe hematuria seems to be diminishing, somewhat unlikely prominent clot is present in bladder.  Nonetheless given CT findings and some uncertainty of intravesical anatomy and previous radiation therapy have recommended cystoscopy with clot evacuation and possible biopsy in the morning.  We would attempt right ureteral stent placement and I expect this will be difficult if not impossible

## 2024-08-07 NOTE — PROGRESS NOTES
"  [facility]  CARDIAC EP PROGRESS NOTE    Date of Admission: 8/4/2024  Date of Service: 08/07/24    Primary Care Physician: Nain Morel MD    Chief Complaint: hematuria      Subjective      HPI: No cardiac complaints. Rate controlled afib on tele review.      Objective   Vitals: /75 (BP Location: Right arm, Patient Position: Lying)   Pulse 99   Temp 98.1 °F (36.7 °C) (Oral)   Resp 18   Ht 182.9 cm (72.01\")   Wt 71.3 kg (157 lb 3 oz)   SpO2 98%   BMI 21.31 kg/m²     Physical Exam:  GENERAL: Alert, cooperative, in no acute distress. Chronically ill-appearing  HEART: Regular rate and irreg irreg rhythm; systolic murmur noted, no rubs or gallops  LUNGS: Clear to auscultation bilaterally. No wheezing, rales or rhonchi. Nonlabored breathing  NEUROLOGIC: No gross focal abnormalities  EXTREMITIES: No obvious deformities, cyanosis, or edema noted.   PSYCH: normal mood, behavior    Results:  Results from last 7 days   Lab Units 08/06/24  0756 08/05/24  1126 08/04/24  1647   WBC 10*3/mm3 7.80 7.83 8.18   HEMOGLOBIN g/dL 8.4* 8.5* 9.5*   HEMATOCRIT % 27.0* 27.0* 30.2*   PLATELETS 10*3/mm3 161 159 197     Results from last 7 days   Lab Units 08/07/24  0632 08/06/24  0756 08/05/24  1126   SODIUM mmol/L 140 139 137   POTASSIUM mmol/L 4.1 4.0 4.5   CHLORIDE mmol/L 108* 107 107   CO2 mmol/L 20.0* 24.0 20.0*   BUN mg/dL 14 15 16   CREATININE mg/dL 1.18 1.42* 1.50*   GLUCOSE mg/dL 86 73 113*      Lab Results   Component Value Date    CHOL 117 08/05/2024    TRIG 96 08/05/2024    HDL 42 08/05/2024    LDL 57 08/05/2024    AST 11 08/04/2024    ALT <5 08/04/2024         Results from last 7 days   Lab Units 08/05/24  1126   CHOLESTEROL mg/dL 117   TRIGLYCERIDES mg/dL 96   HDL CHOL mg/dL 42   LDL CHOL mg/dL 57     Results from last 7 days   Lab Units 08/05/24  1126   TSH uIU/mL 11.530*         Results from last 7 days   Lab Units 08/04/24  0950   PROTIME Seconds 13.3   INR  0.97   APTT " seconds 31.3*     Results from last 7 days   Lab Units 08/04/24  1647 08/04/24  1211 08/04/24  0950   HSTROP T ng/L 87* 95* 91*             Intake/Output Summary (Last 24 hours) at 8/7/2024 0845  Last data filed at 8/7/2024 0800  Gross per 24 hour   Intake 240 ml   Output -550 ml   Net 790 ml       I personally reviewed the patient's EKG/Telemetry data    Radiology Data:   MRI Brain With & Without Contrast    Result Date: 8/5/2024  Impression: 1.No evidence of acute intracranial process nor metastatic disease. 2.Minimal progression of nonspecific white matter changes likely related to microvascular ischemic disease. 3.Symmetric nonspecific decreased signal intensity within the basal ganglia which can be senescent in nature. Electronically Signed: Steve Peace MD  8/5/2024 10:30 AM EDT  Workstation ID: [id number]    CT Angiogram Head    Result Date: 8/4/2024  Less than 50% stenosis bilateral internal carotid arteries.  Normal intracranial circulation.  Peripheral bilateral pneumonia, possibly viral.    CTDI: 32.17 mGy DLP:601.33 mGy.cm   This report was signed and finalized on 8/4/2024 1:50 PM by Rebecca Godwin MD.      CT Angiogram Neck    Result Date: 8/4/2024  Less than 50% stenosis bilateral internal carotid arteries.  Normal intracranial circulation.  Peripheral bilateral pneumonia, possibly viral.    CTDI: 32.17 mGy DLP:601.33 mGy.cm   This report was signed and finalized on 8/4/2024 1:50 PM by Rebecca Godwin MD.      CT Abdomen Pelvis With Contrast    Result Date: 8/4/2024  Persistent, moderate left hydronephrosis despite ureteral stent placement.  Worsened right moderate hydronephrosis which appears to be secondary to a right posterior bladder wall mass with some extension of the enhancing mass into the distal right ureter.  Stable appearance of the infrarenal abdominal aortic aneurysm with extension into the common iliac arteries.  CTDI: 8.21 mGy DLP:737.75 mGy.cm  This report was signed and finalized on 8/4/2024  1:07 PM by Rebecca Godwin MD.      CT Angiogram Chest Pulmonary Embolism    Result Date: 8/4/2024  No evidence of pulmonary embolism.  Suspected pneumonia, possibly viral.   CTDI: 8.21 mGy DLP:737.75 mGy.cm  This report was signed and finalized on 8/4/2024 12:11 PM by Rebecca Godwin MD.      XR Chest 1 View    Result Date: 8/4/2024  Stable chest..     This report was signed and finalized on 8/4/2024 10:15 AM by Rebecca Godwin MD.         Current Medications:  aspirin, 81 mg, Oral, Daily  atorvastatin, 80 mg, Oral, Nightly  insulin lispro, 2-7 Units, Subcutaneous, 4x Daily AC & at Bedtime  levothyroxine, 50 mcg, Oral, Q AM  nicotine, 1 patch, Transdermal, Q24H  pantoprazole, 40 mg, Oral, Q AM  sodium chloride, 10 mL, Intravenous, Q12H  vitamin B-12, 1,000 mcg, Oral, Daily           Assessment  Permanent atrial fibrillation, now with intermittent RVR  Rate control complicated by multiple acute on chronic comorbidities as well as hypotension  Echo 8/2024: LVEF 48%, normal bubble study, mod MR, biatrial enlargement  Euvolemic and asymptomatic, defer ischemic eval  Hematuria in setting of chronic bilateral hydronephrosis requiring bilateral nephrostomy tubes, and new bladder mass/history of prostate cancer s/p radiation  History of GI bleed on warfarin, worsening hematuria on Eliquis. and worsening hematuria with heparin drip this admission  COPD/history of lung cancer/continued tobacco use  CAD s/p RCA stent   Daily EtOH     Plan  Patient has a history of permanent atrial fibrillation. The patient's ventricular rates are acceptable at current with goal <120 bpm and he is asymptomatic.  Ideally would add PO metoprolol tartrate 12.5 mg twice daily if patient's blood pressures remain >105 systolic consistently.  They are presently too low.  Rhythm control strategies would likely be futile in setting of permanent atrial fibrillation >5 years.  If rate control strategy is unable to control ventricular rates, would consider ablate  and pace strategy. Defer for now  Patient has not been on anticoagulation for most of the year.  Defer anticoagulation currently as bleeding risk too high with active hematuria and bladder mass until/if bleeding risk from urologic issues improves.   Continue aspirin    Nothing further to add from cardiac perspective. We will follow on as needed basis. Reach out for further recommendations.    Electronically signed by Lan Pride PA-C, 08/07/24, 11:55 AM EDT.

## 2024-08-07 NOTE — DISCHARGE INSTRUCTIONS
-Continue Aspirin 81mg daily; continue to monitor urine and stool for bleeding; call our office if present [phone]  -Continue Lipitor 40mg nightly for cholesterol  -Monitor blood pressure; goal 140/80  -Call 911 for stroke symptoms (weakness or numbness on one side, difficulty speaking, slurred speech, visual loss, balance difficulty, or sudden severe headache)  -Follow-up with Cardiology to discuss anticoagulation verses left atrial appendage closure.

## 2024-08-08 ENCOUNTER — ANESTHESIA EVENT (OUTPATIENT)
Dept: PERIOP | Facility: HOSPITAL | Age: 74
End: 2024-08-08
Payer: MEDICARE

## 2024-08-08 ENCOUNTER — ANESTHESIA (OUTPATIENT)
Dept: PERIOP | Facility: HOSPITAL | Age: 74
End: 2024-08-08
Payer: MEDICARE

## 2024-08-08 LAB
GLUCOSE BLDC GLUCOMTR-MCNC: 196 MG/DL (ref 70–130)
GLUCOSE BLDC GLUCOMTR-MCNC: 271 MG/DL (ref 70–130)
GLUCOSE BLDC GLUCOMTR-MCNC: 97 MG/DL (ref 70–130)
GLUCOSE BLDC GLUCOMTR-MCNC: 97 MG/DL (ref 70–130)
GLUCOSE BLDC GLUCOMTR-MCNC: 98 MG/DL (ref 70–130)

## 2024-08-08 PROCEDURE — 99232 SBSQ HOSP IP/OBS MODERATE 35: CPT | Performed by: INTERNAL MEDICINE

## 2024-08-08 PROCEDURE — 25010000002 CEFAZOLIN PER 500 MG: Performed by: UROLOGY

## 2024-08-08 PROCEDURE — 0TCB8ZZ EXTIRPATION OF MATTER FROM BLADDER, VIA NATURAL OR ARTIFICIAL OPENING ENDOSCOPIC: ICD-10-PCS | Performed by: UROLOGY

## 2024-08-08 PROCEDURE — 0T5B8ZZ DESTRUCTION OF BLADDER, VIA NATURAL OR ARTIFICIAL OPENING ENDOSCOPIC: ICD-10-PCS | Performed by: UROLOGY

## 2024-08-08 PROCEDURE — 82948 REAGENT STRIP/BLOOD GLUCOSE: CPT

## 2024-08-08 PROCEDURE — 25810000003 LACTATED RINGERS PER 1000 ML: Performed by: ANESTHESIOLOGY

## 2024-08-08 PROCEDURE — 25010000002 PROPOFOL 10 MG/ML EMULSION: Performed by: NURSE ANESTHETIST, CERTIFIED REGISTERED

## 2024-08-08 PROCEDURE — 25010000002 DEXAMETHASONE PER 1 MG: Performed by: NURSE ANESTHETIST, CERTIFIED REGISTERED

## 2024-08-08 PROCEDURE — 25010000002 ONDANSETRON PER 1 MG: Performed by: NURSE ANESTHETIST, CERTIFIED REGISTERED

## 2024-08-08 PROCEDURE — 25010000002 FENTANYL CITRATE (PF) 100 MCG/2ML SOLUTION: Performed by: NURSE ANESTHETIST, CERTIFIED REGISTERED

## 2024-08-08 RX ORDER — SODIUM CHLORIDE 9 MG/ML
40 INJECTION, SOLUTION INTRAVENOUS AS NEEDED
Status: DISCONTINUED | OUTPATIENT
Start: 2024-08-08 | End: 2024-08-08 | Stop reason: HOSPADM

## 2024-08-08 RX ORDER — PHENYLEPHRINE HCL IN 0.9% NACL 1 MG/10 ML
SYRINGE (ML) INTRAVENOUS AS NEEDED
Status: DISCONTINUED | OUTPATIENT
Start: 2024-08-08 | End: 2024-08-08 | Stop reason: SURG

## 2024-08-08 RX ORDER — SODIUM CHLORIDE 0.9 % (FLUSH) 0.9 %
10 SYRINGE (ML) INJECTION AS NEEDED
Status: DISCONTINUED | OUTPATIENT
Start: 2024-08-08 | End: 2024-08-08 | Stop reason: HOSPADM

## 2024-08-08 RX ORDER — PROMETHAZINE HYDROCHLORIDE 25 MG/1
25 TABLET ORAL ONCE AS NEEDED
Status: DISCONTINUED | OUTPATIENT
Start: 2024-08-08 | End: 2024-08-08 | Stop reason: HOSPADM

## 2024-08-08 RX ORDER — LIDOCAINE HYDROCHLORIDE 10 MG/ML
INJECTION, SOLUTION EPIDURAL; INFILTRATION; INTRACAUDAL; PERINEURAL AS NEEDED
Status: DISCONTINUED | OUTPATIENT
Start: 2024-08-08 | End: 2024-08-08 | Stop reason: SURG

## 2024-08-08 RX ORDER — LEVOFLOXACIN 500 MG/1
500 TABLET, FILM COATED ORAL EVERY 24 HOURS
Status: COMPLETED | OUTPATIENT
Start: 2024-08-08 | End: 2024-08-09

## 2024-08-08 RX ORDER — FENTANYL CITRATE 50 UG/ML
INJECTION, SOLUTION INTRAMUSCULAR; INTRAVENOUS AS NEEDED
Status: DISCONTINUED | OUTPATIENT
Start: 2024-08-08 | End: 2024-08-08 | Stop reason: SURG

## 2024-08-08 RX ORDER — NALOXONE HCL 0.4 MG/ML
0.4 VIAL (ML) INJECTION AS NEEDED
Status: DISCONTINUED | OUTPATIENT
Start: 2024-08-08 | End: 2024-08-08 | Stop reason: HOSPADM

## 2024-08-08 RX ORDER — METOPROLOL TARTRATE 1 MG/ML
5 INJECTION, SOLUTION INTRAVENOUS ONCE
Status: DISCONTINUED | OUTPATIENT
Start: 2024-08-08 | End: 2024-08-08

## 2024-08-08 RX ORDER — SODIUM CHLORIDE 0.9 % (FLUSH) 0.9 %
10 SYRINGE (ML) INJECTION EVERY 12 HOURS SCHEDULED
Status: DISCONTINUED | OUTPATIENT
Start: 2024-08-08 | End: 2024-08-08 | Stop reason: HOSPADM

## 2024-08-08 RX ORDER — HYDRALAZINE HYDROCHLORIDE 20 MG/ML
5 INJECTION INTRAMUSCULAR; INTRAVENOUS
Status: DISCONTINUED | OUTPATIENT
Start: 2024-08-08 | End: 2024-08-08 | Stop reason: HOSPADM

## 2024-08-08 RX ORDER — DROPERIDOL 2.5 MG/ML
0.62 INJECTION, SOLUTION INTRAMUSCULAR; INTRAVENOUS ONCE AS NEEDED
Status: DISCONTINUED | OUTPATIENT
Start: 2024-08-08 | End: 2024-08-08 | Stop reason: HOSPADM

## 2024-08-08 RX ORDER — HYDROCODONE BITARTRATE AND ACETAMINOPHEN 5; 325 MG/1; MG/1
1 TABLET ORAL ONCE AS NEEDED
Status: DISCONTINUED | OUTPATIENT
Start: 2024-08-08 | End: 2024-08-08 | Stop reason: HOSPADM

## 2024-08-08 RX ORDER — FENTANYL CITRATE 50 UG/ML
50 INJECTION, SOLUTION INTRAMUSCULAR; INTRAVENOUS
Status: DISCONTINUED | OUTPATIENT
Start: 2024-08-08 | End: 2024-08-08 | Stop reason: HOSPADM

## 2024-08-08 RX ORDER — ONDANSETRON 2 MG/ML
4 INJECTION INTRAMUSCULAR; INTRAVENOUS ONCE AS NEEDED
Status: DISCONTINUED | OUTPATIENT
Start: 2024-08-08 | End: 2024-08-08 | Stop reason: HOSPADM

## 2024-08-08 RX ORDER — SODIUM CHLORIDE 0.9 % (FLUSH) 0.9 %
3-10 SYRINGE (ML) INJECTION AS NEEDED
Status: DISCONTINUED | OUTPATIENT
Start: 2024-08-08 | End: 2024-08-08 | Stop reason: HOSPADM

## 2024-08-08 RX ORDER — HYDROMORPHONE HYDROCHLORIDE 1 MG/ML
0.5 INJECTION, SOLUTION INTRAMUSCULAR; INTRAVENOUS; SUBCUTANEOUS
Status: DISCONTINUED | OUTPATIENT
Start: 2024-08-08 | End: 2024-08-08 | Stop reason: HOSPADM

## 2024-08-08 RX ORDER — SODIUM CHLORIDE, SODIUM LACTATE, POTASSIUM CHLORIDE, CALCIUM CHLORIDE 600; 310; 30; 20 MG/100ML; MG/100ML; MG/100ML; MG/100ML
9 INJECTION, SOLUTION INTRAVENOUS CONTINUOUS PRN
Status: DISCONTINUED | OUTPATIENT
Start: 2024-08-08 | End: 2024-08-11 | Stop reason: HOSPADM

## 2024-08-08 RX ORDER — LABETALOL HYDROCHLORIDE 5 MG/ML
5 INJECTION, SOLUTION INTRAVENOUS
Status: DISCONTINUED | OUTPATIENT
Start: 2024-08-08 | End: 2024-08-08 | Stop reason: HOSPADM

## 2024-08-08 RX ORDER — SODIUM CHLORIDE 0.9 % (FLUSH) 0.9 %
3 SYRINGE (ML) INJECTION EVERY 12 HOURS SCHEDULED
Status: DISCONTINUED | OUTPATIENT
Start: 2024-08-08 | End: 2024-08-08 | Stop reason: HOSPADM

## 2024-08-08 RX ORDER — PROPOFOL 10 MG/ML
VIAL (ML) INTRAVENOUS AS NEEDED
Status: DISCONTINUED | OUTPATIENT
Start: 2024-08-08 | End: 2024-08-08 | Stop reason: SURG

## 2024-08-08 RX ORDER — ONDANSETRON 2 MG/ML
INJECTION INTRAMUSCULAR; INTRAVENOUS AS NEEDED
Status: DISCONTINUED | OUTPATIENT
Start: 2024-08-08 | End: 2024-08-08 | Stop reason: SURG

## 2024-08-08 RX ORDER — DROPERIDOL 2.5 MG/ML
0.62 INJECTION, SOLUTION INTRAMUSCULAR; INTRAVENOUS
Status: DISCONTINUED | OUTPATIENT
Start: 2024-08-08 | End: 2024-08-08 | Stop reason: HOSPADM

## 2024-08-08 RX ORDER — PROMETHAZINE HYDROCHLORIDE 25 MG/1
25 SUPPOSITORY RECTAL ONCE AS NEEDED
Status: DISCONTINUED | OUTPATIENT
Start: 2024-08-08 | End: 2024-08-08 | Stop reason: HOSPADM

## 2024-08-08 RX ORDER — MEPERIDINE HYDROCHLORIDE 25 MG/ML
12.5 INJECTION INTRAMUSCULAR; INTRAVENOUS; SUBCUTANEOUS
Status: DISCONTINUED | OUTPATIENT
Start: 2024-08-08 | End: 2024-08-08 | Stop reason: HOSPADM

## 2024-08-08 RX ORDER — DEXAMETHASONE SODIUM PHOSPHATE 4 MG/ML
INJECTION, SOLUTION INTRA-ARTICULAR; INTRALESIONAL; INTRAMUSCULAR; INTRAVENOUS; SOFT TISSUE AS NEEDED
Status: DISCONTINUED | OUTPATIENT
Start: 2024-08-08 | End: 2024-08-08 | Stop reason: SURG

## 2024-08-08 RX ORDER — IPRATROPIUM BROMIDE AND ALBUTEROL SULFATE 2.5; .5 MG/3ML; MG/3ML
3 SOLUTION RESPIRATORY (INHALATION) ONCE AS NEEDED
Status: DISCONTINUED | OUTPATIENT
Start: 2024-08-08 | End: 2024-08-08 | Stop reason: HOSPADM

## 2024-08-08 RX ADMIN — FENTANYL CITRATE 50 MCG: 50 INJECTION, SOLUTION INTRAMUSCULAR; INTRAVENOUS at 10:28

## 2024-08-08 RX ADMIN — ONDANSETRON 4 MG: 2 INJECTION INTRAMUSCULAR; INTRAVENOUS at 10:40

## 2024-08-08 RX ADMIN — Medication 12.5 MG: at 13:03

## 2024-08-08 RX ADMIN — PANTOPRAZOLE SODIUM 40 MG: 40 TABLET, DELAYED RELEASE ORAL at 05:42

## 2024-08-08 RX ADMIN — Medication 200 MCG: at 10:48

## 2024-08-08 RX ADMIN — ATORVASTATIN CALCIUM 40 MG: 40 TABLET, FILM COATED ORAL at 21:18

## 2024-08-08 RX ADMIN — Medication 200 MCG: at 10:39

## 2024-08-08 RX ADMIN — Medication 10 ML: at 21:18

## 2024-08-08 RX ADMIN — SODIUM CHLORIDE 2000 MG: 900 INJECTION INTRAVENOUS at 10:35

## 2024-08-08 RX ADMIN — NICOTINE 1 PATCH: 14 PATCH, EXTENDED RELEASE TRANSDERMAL at 17:45

## 2024-08-08 RX ADMIN — Medication 300 MCG: at 10:33

## 2024-08-08 RX ADMIN — Medication 5 MG: at 00:34

## 2024-08-08 RX ADMIN — FENTANYL CITRATE 50 MCG: 50 INJECTION, SOLUTION INTRAMUSCULAR; INTRAVENOUS at 10:39

## 2024-08-08 RX ADMIN — SODIUM CHLORIDE, POTASSIUM CHLORIDE, SODIUM LACTATE AND CALCIUM CHLORIDE: 600; 310; 30; 20 INJECTION, SOLUTION INTRAVENOUS at 10:19

## 2024-08-08 RX ADMIN — PROPOFOL 150 MG: 10 INJECTION, EMULSION INTRAVENOUS at 10:28

## 2024-08-08 RX ADMIN — LIDOCAINE HYDROCHLORIDE 50 MG: 10 INJECTION, SOLUTION EPIDURAL; INFILTRATION; INTRACAUDAL; PERINEURAL at 10:28

## 2024-08-08 RX ADMIN — LEVOTHYROXINE SODIUM 50 MCG: 0.05 TABLET ORAL at 05:42

## 2024-08-08 RX ADMIN — LEVOFLOXACIN 500 MG: 500 TABLET, FILM COATED ORAL at 13:02

## 2024-08-08 RX ADMIN — DEXAMETHASONE SODIUM PHOSPHATE 4 MG: 4 INJECTION INTRA-ARTICULAR; INTRALESIONAL; INTRAMUSCULAR; INTRAVENOUS; SOFT TISSUE at 10:36

## 2024-08-08 RX ADMIN — Medication 10 ML: at 08:45

## 2024-08-08 NOTE — ANESTHESIA PROCEDURE NOTES
Airway  Urgency: elective    Date/Time: 8/8/2024 10:29 AM    General Information and Staff    Patient location during procedure: OR  CRNA/CAA: Elle Deutsch CRNA    Indications and Patient Condition  Indications for airway management: airway protection    Preoxygenated: yes  Mask difficulty assessment: 0 - not attempted    Final Airway Details  Final airway type: supraglottic airway      Successful airway: I-gel  Size 4     Assessment: lips, teeth, and gum same as pre-op

## 2024-08-08 NOTE — ANESTHESIA POSTPROCEDURE EVALUATION
Patient: Stephane Noe    Procedure Summary       Date: 08/08/24 Room / Location:  [facility] OR 07 / [facility]    Anesthesia Start: 1019 Anesthesia Stop: 1116    Procedure: CYSTOSCOPY BLADDER CLOT EVACUATION, POSSIBLE BLADDER BIOPSY (Urethra) Diagnosis:     Surgeons: Raffy Cam MD Provider: Beltran Rosenberg MD    Anesthesia Type: general ASA Status: 4            Anesthesia Type: general    Vitals  Vitals Value Taken Time   /78 08/08/24 1116   Temp 97.1 °F (36.2 °C) 08/08/24 1116   Pulse 91 08/08/24 1116   Resp 16 08/08/24 1116   SpO2 99 % 08/08/24 1116           Post Anesthesia Care and Evaluation    Patient location during evaluation: PACU  Patient participation: waiting for patient participation  Level of consciousness: sleepy but conscious    Airway patency: patent  Anesthetic complications: No anesthetic complications  PONV Status: none  Cardiovascular status: blood pressure returned to baseline  Respiratory status: nasal cannula and spontaneous ventilation  Hydration status: acceptable  No anesthesia care post op

## 2024-08-08 NOTE — PROGRESS NOTES
postoperative note      Urine is now very clear on light CBI  Plan is to wean CBI to off as tolerated  I believe it is likely he can have Guaman catheter removed in the morning if urine is remaining clear.

## 2024-08-08 NOTE — ANESTHESIA PREPROCEDURE EVALUATION
Anesthesia Evaluation     Patient summary reviewed and Nursing notes reviewed                Airway   Mallampati: II  TM distance: >3 FB  Neck ROM: full  No difficulty expected  Dental - normal exam   (+) poor dentition    Pulmonary - normal exam   (+) a smoker Current, lung cancer, COPD,  Cardiovascular     Rhythm: irregular  Rate: normal    (+) hypertension, CAD, cardiac stents (RCA 2011) , dysrhythmias (ventricular rates are acceptable at current with goal <120 bpm and he is asymptomatic per cardiology) Atrial Fib, PVD, hyperlipidemia    ROS comment:   Echo 8/24: EF 45-50%, mod MR, mild TR    LHC 10/17: 50% L circ stenosis with normal IFR, normal EF    Neuro/Psych  (+) TIA  GI/Hepatic/Renal/Endo    (+) GERD, renal disease- CRI, diabetes mellitus, thyroid problem hypothyroidism    Musculoskeletal (-) negative ROS    Abdominal  - normal exam    Bowel sounds: normal.   Substance History   (+) alcohol use     OB/GYN negative ob/gyn ROS         Other      history of cancer (lung cancer s/p XRT, prostate cancer s/p radiation)    ROS/Med Hx Other: HCT 27                Anesthesia Plan    ASA 4     general     intravenous induction     Anesthetic plan, risks, benefits, and alternatives have been provided, discussed and informed consent has been obtained with: patient.    Plan discussed with CRNA.    CODE STATUS:    Level Of Support Discussed With: Patient  Code Status (Patient has no pulse and is not breathing): CPR (Attempt to Resuscitate)  Medical Interventions (Patient has pulse or is breathing): Full Support

## 2024-08-08 NOTE — CASE MANAGEMENT/SOCIAL WORK
Continued Stay Note  [facility]     Patient Name: Stephane Noe  MRN: [id number]  Today's Date: 8/8/2024    Admit Date: 8/4/2024    Plan: Home w/Home Health   Discharge Plan       Row Name 08/08/24 1212       Plan    Plan Home w/Home Health    Plan Comments Discussed patient in MDR.  Patient having cystoscopy today, 8/8.  His plan is still to return home with [facility] at discharge.  Resume orders in [facility] for PT, OT and skilled nursing.  CM will continue to follow and notify home health when patient discharges.                   Discharge Codes    No documentation.                 Expected Discharge Date and Time       Expected Discharge Date Expected Discharge Time    Aug 12, 2024               Fernanda Fernandes RN

## 2024-08-08 NOTE — PROGRESS NOTES
[facility] Medicine Services  PROGRESS NOTE    Patient Name: Stephane Noe  : 1950  MRN: [id number]    Date of Admission: 2024  Primary Care Physician: Nain Morel MD    Subjective   Subjective     CC:  Hematuria    HPI:  Seen postop, HR fluctuating into the 100s.  Patient not symptomatic      Objective   Objective     Vital Signs:   Temp:  [97.1 °F (36.2 °C)-98.3 °F (36.8 °C)] 97.3 °F (36.3 °C)  Heart Rate:  [] 114  Resp:  [16-18] 16  BP: (108-133)/(66-85) 128/75  Flow (L/min):  [2-4] 2     Physical Exam:  Constitutional: No acute distress, awake, alert  HENT: NCAT, mucous membranes moist  Respiratory: Respiratory effort normal   Cardiovascular: Irregular, variable heart rate  : Catheter with CBI running  Musculoskeletal: No bilateral ankle edema  Psychiatric: Appropriate affect, cooperative  Neurologic: Oriented x 2-3, speech clear  Skin: No rashes      Results Reviewed:  LAB RESULTS:      Lab 24  0756 24  1126 24  1647 24  1341 24  1250 24  0950   WBC 7.80 7.83 8.18  --   --  8.35   HEMOGLOBIN 8.4* 8.5* 9.5*  --   --  10.5*   HEMATOCRIT 27.0* 27.0* 30.2*  --   --  32.6*   PLATELETS 161 159 197  --   --  223   NEUTROS ABS 5.81 6.37 6.56  --   --  5.70   IMMATURE GRANS (ABS) 0.06* 0.04 0.06*  --   --  0.08*   LYMPHS ABS 1.23 0.78 0.86  --   --  1.76   MONOS ABS 0.56 0.58 0.64  --   --  0.66   EOS ABS 0.10 0.04 0.03  --   --  0.10   .5* 104.7* 102.4*  --   --  102.2*   SED RATE  --  40*  --   --   --   --    PROCALCITONIN  --   --   --   --   --  0.23   LACTATE  --   --  1.7  --  3.5*  --    PROTIME  --   --   --   --   --  13.3   APTT  --   --   --   --   --  31.3*   HEPARIN ANTI-XA  --   --   --  0.10*  --   --          Lab 24  0632 24  0756 24  1126 24  1647 24  0950   SODIUM 140 139 137 134* 134*   POTASSIUM 4.1 4.0 4.5 4.8 4.6   CHLORIDE 108* 107 107 104 102   CO2 20.0* 24.0 20.0*  19.0* 18.5*   ANION GAP 12.0 8.0 10.0 11.0 13.5   BUN 14 15 16 19 19   CREATININE 1.18 1.42* 1.50* 1.38* 1.48*   EGFR 64.8 51.9* 48.6* 53.7* 49.3*   GLUCOSE 86 73 113* 82 85   CALCIUM 8.6 8.3* 8.3* 8.2* 9.5   MAGNESIUM 1.6 1.8  --   --  1.8   TSH  --   --  11.530*  --   --          Lab 08/04/24  1647 08/04/24  0950   TOTAL PROTEIN 5.5* 6.9   ALBUMIN 2.6* 3.4*   GLOBULIN 2.9 3.5   ALT (SGPT) <5 5   AST (SGOT) 11 12   BILIRUBIN 0.4 0.2   ALK PHOS 75 97         Lab 08/04/24  1647 08/04/24  1211 08/04/24  0950   HSTROP T 87* 95* 91*   PROTIME  --   --  13.3   INR  --   --  0.97         Lab 08/05/24  1126   CHOLESTEROL 117   LDL CHOL 57   HDL CHOL 42   TRIGLYCERIDES 96         Lab 08/05/24  1126 08/04/24  1258   FOLATE 8.30  --    VITAMIN B 12 602  --    ABO TYPING  --  A   RH TYPING  --  Positive   ANTIBODY SCREEN  --  Negative         Brief Urine Lab Results  (Last result in the past 365 days)        Color   Clarity   Blood   Leuk Est   Nitrite   Protein   CREAT   Urine HCG        08/04/24 1717 Red   Turbid   Large (3+)   Moderate (2+)   Positive   >=300 mg/dL (3+)                   Microbiology Results Abnormal       Procedure Component Value - Date/Time    Urine Culture - Urine, Urine, Clean Catch [[id number]] Collected: 08/04/24 1717    Lab Status: Final result Specimen: Urine, Clean Catch Updated: 08/05/24 2051     Urine Culture <10,000 CFU/mL Normal Urogenital Jayne    Narrative:      Colonization of the urinary tract without infection is common. Treatment is discouraged unless the patient is symptomatic, pregnant, or undergoing an invasive urologic procedure.    S. Pneumo Ag Urine or CSF - Urine, Urine, Clean Catch [[id number]]  (Normal) Collected: 08/04/24 1717    Lab Status: Final result Specimen: Urine, Clean Catch Updated: 08/04/24 2155     Strep Pneumo Ag Negative    MRSA Screen, PCR (Inpatient) - Swab, Nares [[id number]]  (Normal) Collected: 08/04/24 1820    Lab Status: Final result Specimen: Swab from Nares  Updated: 08/04/24 1935     MRSA PCR Negative    Narrative:      The negative predictive value of this diagnostic test is high and should only be used to consider de-escalating anti-MRSA therapy. A positive result may indicate colonization with MRSA and must be correlated clinically.  MRSA Negative    Respiratory Panel PCR w/COVID-19(SARS-CoV-2) [facility] In-House, NP Swab in UTM/VTM, 2 HR TAT - Swab, Nasopharynx [[id number]]  (Normal) Collected: 08/04/24 1716    Lab Status: Final result Specimen: Swab from Nasopharynx Updated: 08/04/24 1808     ADENOVIRUS, PCR Not Detected     Coronavirus 229E Not Detected     Coronavirus HKU1 Not Detected     Coronavirus NL63 Not Detected     Coronavirus OC43 Not Detected     COVID19 Not Detected     Human Metapneumovirus Not Detected     Human Rhinovirus/Enterovirus Not Detected     Influenza A PCR Not Detected     Influenza B PCR Not Detected     Parainfluenza Virus 1 Not Detected     Parainfluenza Virus 2 Not Detected     Parainfluenza Virus 3 Not Detected     Parainfluenza Virus 4 Not Detected     RSV, PCR Not Detected     Bordetella pertussis pcr Not Detected     Bordetella parapertussis PCR Not Detected     Chlamydophila pneumoniae PCR Not Detected     Mycoplasma pneumo by PCR Not Detected    Narrative:      In the setting of a positive respiratory panel with a viral infection PLUS a negative procalcitonin without other underlying concern for bacterial infection, consider observing off antibiotics or discontinuation of antibiotics and continue supportive care. If the respiratory panel is positive for atypical bacterial infection (Bordetella pertussis, Chlamydophila pneumoniae, or Mycoplasma pneumoniae), consider antibiotic de-escalation to target atypical bacterial infection.            No radiology results from the last 24 hrs    Results for orders placed during the hospital encounter of 08/04/24    Adult Transthoracic Echo Complete W/ Cont if Necessary Per  Protocol    Interpretation Summary    Left ventricular systolic function is low normal. Calculated left ventricular EF = 47.9% Left ventricular ejection fraction appears to be 46 - 50%.    Saline test results are negative.  Normal bubble study.    Moderate mitral valve regurgitation is present.    Biatrial enlargement noted.  Correlate with any other signs of amyloidosis.    The right ventricular cavity moderately is dilated.      Current medications:  Scheduled Meds:aspirin, 81 mg, Oral, Daily  atorvastatin, 40 mg, Oral, Nightly  insulin lispro, 2-7 Units, Subcutaneous, 4x Daily AC & at Bedtime  levoFLOXacin, 500 mg, Oral, Q24H  levothyroxine, 50 mcg, Oral, Q AM  metoprolol tartrate, 12.5 mg, Oral, Q12H  nicotine, 1 patch, Transdermal, Q24H  pantoprazole, 40 mg, Oral, Q AM  sodium chloride, 10 mL, Intravenous, Q12H  vitamin B-12, 1,000 mcg, Oral, Daily      Continuous Infusions:lactated ringers, 9 mL/hr, Last Rate: 9 mL/hr (08/08/24 1019)      PRN Meds:.  acetaminophen **OR** acetaminophen **OR** acetaminophen    senna-docusate sodium **AND** polyethylene glycol **AND** bisacodyl **AND** bisacodyl    dextrose    dextrose    glucagon (human recombinant)    HYDROcodone-acetaminophen    ipratropium-albuterol    lactated ringers    melatonin    Morphine **AND** naloxone    nicotine polacrilex    sodium chloride    sodium chloride    Assessment & Plan   Assessment & Plan     Active Hospital Problems    Diagnosis  POA    **Gross hematuria [R31.0]  Yes    Transient ischemic attack (TIA) [G45.9]  Yes    Bladder mass [N32.89]  Yes    Acute UTI (urinary tract infection) [N39.0]  Yes    Bilateral pneumonia [J18.9]  Yes    Abdominal aortic aneurysm (AAA) without rupture [I71.40]  Yes    History of oropharyngeal cancer [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        Brief Hospital Course to date:  Stephane Noe is a 73 y.o. male with past medical history significant for atrial fib (prior on Eliquis but discontinued 4-5 months ago due to recurrent hematuria episodes), HTN, HLD, CAD, COPD and ongoing tobacco use, DM type II, prostate cancer, right lung cancer s/p XRT, AAA repair, recent extensive hospitalization at [facility] x 11 weeks due to LAY, required bilateral nephrostomy tubes for hydronephrosis and pneumonia.  Treated for Legionella at that time with azithro.  Developed gross hematuria again on 8/3 that remained persistent and presented first to [facility] then transferred here with concern for possible stroke.       While in the ED, post CT of the abdomen patient developed aphasia and code stroke was called.  Also found to be in A-fib RVR with hypotension.  Started on heparin drip and diltiazem in ER.  Given vancomycin and Zosyn.  Dr. Mayfield with neurology at [facility] spoke with [facility] stroke team.  They also spoke with Dr. Jernigan with urology regarding new bladder mass and hematuria, and Dr. Espinoza with cardiology regarding A-fib RVR.  He underwent cystoscopy with urology on 8/8 with evacuation of bladder clots and hemostasis of bladder lesion.    This patient's problems and plans were partially entered by my partner and updated as appropriate by me 08/08/24.       Gross hematuria  Bladder mass with hydro  Hx recent LAY and hydronephrosis s/p bilateral nephrostomy tubes Hx prostate cancer s/p radiation   -Urology evaluated, s/p  cystoscopy on 8/8.  Continue CBI   -No anticoagulation for now  -Rocephin DC'd, monitor off antibiotics  -Urine Cx neg  -Blood Cx neg  -renal function improved     Aphasia, resolved  Rule out CVA, TIA  --Seen by Tele-Neurologist Dr Mayfield at [facility]  -- Patient not a candidate for any acute thrombolytic therapy or any acute thrombectomy as his NIH stroke scale is 0   --CTA head/neck negative  --Stroke team evaluated, continue aspirin  --MRI brain with symmetric nonspecific decreased signal intensity within the basal ganglia.  --Echo EF 47%, neg saline test  --F/u  in 6 weeks     Hx of HTN now with hypotension  --Hold BP meds       A-fib RVR  Hx HTN/HLD/CAD  -- Add metoprolol 12.5 mg twice daily  --HR remains controlled currently  --Cont ASA.  Ideally would add anticoagulation once hematuria better  --Echo neg saline test--MRI not concerning for cardioembolic process.    --Would be a good candidate for watchman procedure, can follow-up outpatient     Bilateral pneumonia  Recent Urine Legionella POSITIVE (6/11/2024)  --Per CTA chest.  Recent hx of PNA and was treated.  No signs or sx of active infection.  --Still + for legionella, but no sx.  Monitor off abx.   --PRN nebs     Hx AAA  -- Reports had surgery several years ago.  -- CT A/P at [place] showed stable appearing infra renal abdominal aortic aneurysm with extension into the common iliac arteries.     Hx DM type II (A1c from 7/30/2024 was 9.2)  -- Reports now diet controlled, not on any meds  -- Continue low-dose SSI     COPD  Chronic tobacco use    ETOH use  --reports one shot of whiskey daily   --CIWA, if starts scoring, will add PRN's   --monitor     Hypothyroidism  --TSH is elevated-but trending in the right direction.  Per pharmacy fill records it looks like this was just started on 8/1.  Continue current dose of Synthroid.             Expected Discharge Location and Transportation: Home with   Expected Discharge   Expected Discharge Date: 8/12/2024;  Expected Discharge Time:      VTE Prophylaxis:  Mechanical VTE prophylaxis orders are present.         AM-PAC 6 Clicks Score (PT): 20 (08/08/24 0811)    CODE STATUS:   Code Status and Medical Interventions: CPR (Attempt to Resuscitate); Full Support   Ordered at: 08/04/24 1722     Level Of Support Discussed With:    Patient     Code Status (Patient has no pulse and is not breathing):    CPR (Attempt to Resuscitate)     Medical Interventions (Patient has pulse or is breathing):    Full Support       Delmi Zavaleta,   08/08/24

## 2024-08-08 NOTE — OP NOTE
Operative note    Preoperative diagnosis: Hematuria with bladder clot, history of prostate cancer and radiation therapy  Postoperative diagnosis: Hematuria with bladder clot, bladder lesion with hemorrhage, history of prostate cancer and radiation therapy    Procedure: Cystoscopy, evacuation of bladder clot-moderate, fulguration of bladder lesion    Anesthesia: General   Complications: None  EBL: Scant  Drains: 22 Frisian three-way catheter  Specimens: None      Indications: This patient has a complicated previous urologic history with previous prostate cancer radiation therapy and distal ureteral obstruction with previous left ureteral stent placement.  He has had recurrence of hematuria with clots requiring CBI.      Findings-corrugated roughened surface of much of the floor and lateral walls of the bladder totally obscuring anatomy of the trigone.  The ureteral orifices could not be identified.  A moderate amount of clot was irrigated from the bladder.  Oozing through some of the bladder lesion on the left floor of bladder and bladder neck area was fulgurated completely with Bugbee electrode with excellent hemostasis.  Existing left ureteral stent curl was noted and present in the dome to anterior portion of the bladder.      Procedure-this patient was brought to the operating room where general anesthesia was induced.  Preoperatively IV antibiotics were administered.  Antiembolism stockings have been placed as well.  He was placed in lithotomy position and extremities and all pressure points were padded and protected appropriately.  The genitalia were prepped and draped sterilely.  An appropriate timeout was conducted.  A 22 Frisian panendoscope was passed into the bladder and a thorough inspection was conducted.  Using a bladder evacuator a moderate amount of clot which filled much of the dome of the bladder was evacuated.  There was no residual clot.  Thorough inspection was repeated.  Some slight oozing was  noted in the areas described and a Bugbee electrode was used to fulgurate this extensively in this location.  The scope was removed and a 22 Italian catheter was placed with clear drainage.  The patient was awakened and transported to postop recovery in stable condition.  There were no complications.

## 2024-08-09 ENCOUNTER — TELEPHONE (OUTPATIENT)
Dept: CARDIAC SURGERY | Facility: CLINIC | Age: 74
End: 2024-08-09
Payer: MEDICARE

## 2024-08-09 LAB
BACTERIA SPEC AEROBE CULT: NORMAL
BACTERIA SPEC AEROBE CULT: NORMAL
GLUCOSE BLDC GLUCOMTR-MCNC: 111 MG/DL (ref 70–130)
GLUCOSE BLDC GLUCOMTR-MCNC: 145 MG/DL (ref 70–130)
GLUCOSE BLDC GLUCOMTR-MCNC: 152 MG/DL (ref 70–130)
GLUCOSE BLDC GLUCOMTR-MCNC: 99 MG/DL (ref 70–130)

## 2024-08-09 PROCEDURE — 82948 REAGENT STRIP/BLOOD GLUCOSE: CPT

## 2024-08-09 PROCEDURE — 92507 TX SP LANG VOICE COMM INDIV: CPT

## 2024-08-09 PROCEDURE — 25810000003 SODIUM CHLORIDE 0.9 % SOLUTION: Performed by: INTERNAL MEDICINE

## 2024-08-09 PROCEDURE — 92526 ORAL FUNCTION THERAPY: CPT

## 2024-08-09 PROCEDURE — 99232 SBSQ HOSP IP/OBS MODERATE 35: CPT | Performed by: INTERNAL MEDICINE

## 2024-08-09 RX ORDER — MIDODRINE HYDROCHLORIDE 5 MG/1
5 TABLET ORAL
Status: DISCONTINUED | OUTPATIENT
Start: 2024-08-09 | End: 2024-08-11 | Stop reason: HOSPADM

## 2024-08-09 RX ADMIN — LEVOFLOXACIN 500 MG: 500 TABLET, FILM COATED ORAL at 16:46

## 2024-08-09 RX ADMIN — CYANOCOBALAMIN TAB 1000 MCG 1000 MCG: 1000 TAB at 08:04

## 2024-08-09 RX ADMIN — ASPIRIN 81 MG: 81 TABLET, COATED ORAL at 08:04

## 2024-08-09 RX ADMIN — ATORVASTATIN CALCIUM 40 MG: 40 TABLET, FILM COATED ORAL at 21:19

## 2024-08-09 RX ADMIN — Medication 10 ML: at 21:19

## 2024-08-09 RX ADMIN — SODIUM CHLORIDE 500 ML: 9 INJECTION, SOLUTION INTRAVENOUS at 15:16

## 2024-08-09 RX ADMIN — ACETAMINOPHEN 650 MG: 325 TABLET ORAL at 21:19

## 2024-08-09 RX ADMIN — LEVOTHYROXINE SODIUM 50 MCG: 0.05 TABLET ORAL at 05:55

## 2024-08-09 RX ADMIN — MIDODRINE HYDROCHLORIDE 5 MG: 5 TABLET ORAL at 12:22

## 2024-08-09 RX ADMIN — PANTOPRAZOLE SODIUM 40 MG: 40 TABLET, DELAYED RELEASE ORAL at 05:55

## 2024-08-09 RX ADMIN — Medication 10 ML: at 08:17

## 2024-08-09 RX ADMIN — Medication 12.5 MG: at 08:04

## 2024-08-09 RX ADMIN — MIDODRINE HYDROCHLORIDE 5 MG: 5 TABLET ORAL at 16:46

## 2024-08-09 NOTE — CASE MANAGEMENT/SOCIAL WORK
Discharge Planning Assessment  [facility]     Patient Name: Stephane Noe  MRN: [id number]  Today's Date: 8/9/2024    Admit Date: 8/4/2024    Plan: Home with Home Health   Discharge Needs Assessment    No documentation.                  Discharge Plan       Row Name 08/09/24 1226       Plan    Plan Home with Home Health    Patient/Family in Agreement with Plan yes    Plan Comments Discussed Mr. Noe in MDR. Dishcarge plan is home with [facility] for PT, OT and SN. CM will notify Home Health when patient discharges. [facility] will contact patient with an appointment. CM will continue to follow up.    Final Discharge Disposition Code 06 - home with home health care                  Continued Care and Services - Admitted Since 8/4/2024       Home Medical Care       Service Provider Request Status Selected Services Address Phone Fax Patient Preferred    [facility]  Selected Home Rehabilitation ,  Home Nursing [address], [address] [address] [phone] --                        Krista Desai RN

## 2024-08-09 NOTE — PLAN OF CARE
Goal Outcome Evaluation:                          SLP Diagnosis: cognitive-linguistic disorder (08/09/24 1120)     SLP Swallowing Diagnosis: unable/unwilling to cooperate, suspect chronic, pharyngeal dysphagia (08/09/24 1120)  Treatment Assessment (SLP): no clinical signs of, aspiration, continued, mild, cognitive-linguistic disorder (08/09/24 1120)  Treatment Assessment Comments (SLP): Patient tolerated trials of pureed and regular solids and thin liquids by teaspoon, cup and straw without overt s/s of aspiration. Mildly prolonged masticationof regular solids d/t missing dentition, however grossly WFL. He continues with mild cognitive deficits. Unclear if this is his baseline. Will continue to monitor/address deficits in tx. (08/09/24 1120)  Plan for Continued Treatment (SLP): continue treatment per plan of care (08/09/24 1120)

## 2024-08-09 NOTE — PROGRESS NOTES
[facility] Medicine Services  PROGRESS NOTE    Patient Name: Stephane Noe  : 1950  MRN: [id number]    Date of Admission: 2024  Primary Care Physician: Nain Morel MD    Subjective   Subjective     CC:  Hematuria    HPI:  Blood pressure low after dose of metoprolol but heart rate is much better controlled.  Asymptomatic      Objective   Objective     Vital Signs:   Temp:  [97.3 °F (36.3 °C)-98.6 °F (37 °C)] 98 °F (36.7 °C)  Heart Rate:  [] 70  Resp:  [16-18] 16  BP: ()/(58-83) 112/83     Physical Exam:  Constitutional: sleepy but awakens easily  HENT: NCAT, mucous membranes moist  Respiratory: Respiratory effort normal   Cardiovascular: irregular, rate controlled  : hale with mildly pink tinged urine  Musculoskeletal: No bilateral ankle edema  Psychiatric: Appropriate affect, cooperative  Neurologic: Oriented x 3, speech clear  Skin: No rashes      Results Reviewed:  LAB RESULTS:      Lab 24  0756 24  1126 24  1647 24  1341 24  1250 24  0950   WBC 7.80 7.83 8.18  --   --  8.35   HEMOGLOBIN 8.4* 8.5* 9.5*  --   --  10.5*   HEMATOCRIT 27.0* 27.0* 30.2*  --   --  32.6*   PLATELETS 161 159 197  --   --  223   NEUTROS ABS 5.81 6.37 6.56  --   --  5.70   IMMATURE GRANS (ABS) 0.06* 0.04 0.06*  --   --  0.08*   LYMPHS ABS 1.23 0.78 0.86  --   --  1.76   MONOS ABS 0.56 0.58 0.64  --   --  0.66   EOS ABS 0.10 0.04 0.03  --   --  0.10   .5* 104.7* 102.4*  --   --  102.2*   SED RATE  --  40*  --   --   --   --    PROCALCITONIN  --   --   --   --   --  0.23   LACTATE  --   --  1.7  --  3.5*  --    PROTIME  --   --   --   --   --  13.3   APTT  --   --   --   --   --  31.3*   HEPARIN ANTI-XA  --   --   --  0.10*  --   --          Lab 24  0632 24  0756 24  1126 24  1647 24  0950   SODIUM 140 139 137 134* 134*   POTASSIUM 4.1 4.0 4.5 4.8 4.6   CHLORIDE 108* 107 107 104 102   CO2 20.0* 24.0 20.0*  19.0* 18.5*   ANION GAP 12.0 8.0 10.0 11.0 13.5   BUN 14 15 16 19 19   CREATININE 1.18 1.42* 1.50* 1.38* 1.48*   EGFR 64.8 51.9* 48.6* 53.7* 49.3*   GLUCOSE 86 73 113* 82 85   CALCIUM 8.6 8.3* 8.3* 8.2* 9.5   MAGNESIUM 1.6 1.8  --   --  1.8   TSH  --   --  11.530*  --   --          Lab 08/04/24  1647 08/04/24  0950   TOTAL PROTEIN 5.5* 6.9   ALBUMIN 2.6* 3.4*   GLOBULIN 2.9 3.5   ALT (SGPT) <5 5   AST (SGOT) 11 12   BILIRUBIN 0.4 0.2   ALK PHOS 75 97         Lab 08/04/24  1647 08/04/24  1211 08/04/24  0950   HSTROP T 87* 95* 91*   PROTIME  --   --  13.3   INR  --   --  0.97         Lab 08/05/24  1126   CHOLESTEROL 117   LDL CHOL 57   HDL CHOL 42   TRIGLYCERIDES 96         Lab 08/05/24  1126 08/04/24  1258   FOLATE 8.30  --    VITAMIN B 12 602  --    ABO TYPING  --  A   RH TYPING  --  Positive   ANTIBODY SCREEN  --  Negative         Brief Urine Lab Results  (Last result in the past 365 days)        Color   Clarity   Blood   Leuk Est   Nitrite   Protein   CREAT   Urine HCG        08/04/24 1717 Red   Turbid   Large (3+)   Moderate (2+)   Positive   >=300 mg/dL (3+)                   Microbiology Results Abnormal       Procedure Component Value - Date/Time    Urine Culture - Urine, Urine, Clean Catch [[id number]] Collected: 08/04/24 1717    Lab Status: Final result Specimen: Urine, Clean Catch Updated: 08/05/24 2051     Urine Culture <10,000 CFU/mL Normal Urogenital Jayne    Narrative:      Colonization of the urinary tract without infection is common. Treatment is discouraged unless the patient is symptomatic, pregnant, or undergoing an invasive urologic procedure.    S. Pneumo Ag Urine or CSF - Urine, Urine, Clean Catch [[id number]]  (Normal) Collected: 08/04/24 1717    Lab Status: Final result Specimen: Urine, Clean Catch Updated: 08/04/24 2155     Strep Pneumo Ag Negative    MRSA Screen, PCR (Inpatient) - Swab, Nares [[id number]]  (Normal) Collected: 08/04/24 1820    Lab Status: Final result Specimen: Swab from Nares  Updated: 08/04/24 1935     MRSA PCR Negative    Narrative:      The negative predictive value of this diagnostic test is high and should only be used to consider de-escalating anti-MRSA therapy. A positive result may indicate colonization with MRSA and must be correlated clinically.  MRSA Negative    Respiratory Panel PCR w/COVID-19(SARS-CoV-2) MORALES/DUNG/ERWIN/PAD/COR/ANDIE In-House, NP Swab in UTM/VTM, 2 HR TAT - Swab, Nasopharynx [[id number]]  (Normal) Collected: 08/04/24 1716    Lab Status: Final result Specimen: Swab from Nasopharynx Updated: 08/04/24 1800     ADENOVIRUS, PCR Not Detected     Coronavirus 229E Not Detected     Coronavirus HKU1 Not Detected     Coronavirus NL63 Not Detected     Coronavirus OC43 Not Detected     COVID19 Not Detected     Human Metapneumovirus Not Detected     Human Rhinovirus/Enterovirus Not Detected     Influenza A PCR Not Detected     Influenza B PCR Not Detected     Parainfluenza Virus 1 Not Detected     Parainfluenza Virus 2 Not Detected     Parainfluenza Virus 3 Not Detected     Parainfluenza Virus 4 Not Detected     RSV, PCR Not Detected     Bordetella pertussis pcr Not Detected     Bordetella parapertussis PCR Not Detected     Chlamydophila pneumoniae PCR Not Detected     Mycoplasma pneumo by PCR Not Detected    Narrative:      In the setting of a positive respiratory panel with a viral infection PLUS a negative procalcitonin without other underlying concern for bacterial infection, consider observing off antibiotics or discontinuation of antibiotics and continue supportive care. If the respiratory panel is positive for atypical bacterial infection (Bordetella pertussis, Chlamydophila pneumoniae, or Mycoplasma pneumoniae), consider antibiotic de-escalation to target atypical bacterial infection.            No radiology results from the last 24 hrs    Results for orders placed during the hospital encounter of 08/04/24    Adult Transthoracic Echo Complete W/ Cont if Necessary Per  Protocol    Interpretation Summary    Left ventricular systolic function is low normal. Calculated left ventricular EF = 47.9% Left ventricular ejection fraction appears to be 46 - 50%.    Saline test results are negative.  Normal bubble study.    Moderate mitral valve regurgitation is present.    Biatrial enlargement noted.  Correlate with any other signs of amyloidosis.    The right ventricular cavity moderately is dilated.      Current medications:  Scheduled Meds:aspirin, 81 mg, Oral, Daily  atorvastatin, 40 mg, Oral, Nightly  insulin lispro, 2-7 Units, Subcutaneous, 4x Daily AC & at Bedtime  levoFLOXacin, 500 mg, Oral, Q24H  levothyroxine, 50 mcg, Oral, Q AM  metoprolol tartrate, 12.5 mg, Oral, Q12H  midodrine, 5 mg, Oral, BID AC  nicotine, 1 patch, Transdermal, Q24H  pantoprazole, 40 mg, Oral, Q AM  sodium chloride, 10 mL, Intravenous, Q12H  vitamin B-12, 1,000 mcg, Oral, Daily      Continuous Infusions:lactated ringers, 9 mL/hr, Last Rate: 9 mL/hr (08/08/24 1019)      PRN Meds:.  acetaminophen **OR** acetaminophen **OR** acetaminophen    senna-docusate sodium **AND** polyethylene glycol **AND** bisacodyl **AND** bisacodyl    dextrose    dextrose    glucagon (human recombinant)    HYDROcodone-acetaminophen    ipratropium-albuterol    lactated ringers    melatonin    Morphine **AND** naloxone    nicotine polacrilex    sodium chloride    sodium chloride    Assessment & Plan   Assessment & Plan     Active Hospital Problems    Diagnosis  POA    **Gross hematuria [R31.0]  Yes    Transient ischemic attack (TIA) [G45.9]  Yes    Bladder mass [N32.89]  Yes    Acute UTI (urinary tract infection) [N39.0]  Yes    Bilateral pneumonia [J18.9]  Yes    Abdominal aortic aneurysm (AAA) without rupture [I71.40]  Yes    History of oropharyngeal cancer [Z85.819]  Yes    History of lung cancer [Z85.118]  Not Applicable    History of prostate cancer [Z85.46]  Not Applicable    Physical deconditioning [R53.81]  Yes    History of  laminectomy [Z98.890]  Not Applicable    Anemia [D64.9]  Yes    Cigarette smoker [F17.210]  Yes    CAD (coronary artery disease) [I25.10]  Yes    Primary hypertension [I10]  Yes    Type 2 diabetes mellitus with diabetic polyneuropathy, without long-term current use of insulin [E11.42]  Yes    Atrial fibrillation with rapid ventricular response [I48.91]  Yes      Resolved Hospital Problems   No resolved problems to display.        Brief Hospital Course to date:  Stephane Noe is a 73 y.o. male  with past medical history significant for atrial fib (prior on Eliquis but discontinued 4-5 months ago due to recurrent hematuria episodes), HTN, HLD, CAD, COPD and ongoing tobacco use, DM type II, prostate cancer, right lung cancer s/p XRT, AAA repair, recent extensive hospitalization at [facility] x 11 weeks due to LAY, required bilateral nephrostomy tubes for hydronephrosis and pneumonia.  Treated for Legionella at that time with azithro.  Developed gross hematuria again on 8/3 that remained persistent and presented first to [facility] then transferred here with concern for possible stroke.       While in the ED, post CT of the abdomen patient developed aphasia and code stroke was called.  Also found to be in A-fib RVR with hypotension.  Started on heparin drip and diltiazem in ER.  Given vancomycin and Zosyn.  Dr. Mayfield with neurology at [facility] spoke with [facility] stroke team.  They also spoke with Dr. Jernigan with urology regarding new bladder mass and hematuria, and Dr. Espinoza with cardiology regarding A-fib RVR.  He underwent cystoscopy with urology on 8/8 with evacuation of bladder clots and hemostasis of bladder lesion.     This patient's problems and plans were partially entered by my partner and updated as appropriate by me 08/09/24.        Gross hematuria, improving  Bladder mass with hydro  Hx recent LAY and hydronephrosis s/p bilateral nephrostomy tubes Hx prostate cancer s/p  radiation   -Urology evaluated, s/p cystoscopy on 8/8.  Wean cbi   -No anticoagulation for now  -Urine Cx neg  -Blood Cx neg  -renal function improved     Aphasia, resolved  Rule out CVA, TIA  --Seen by Tele-Neurologist Dr Mayfield at [facility]  -- Patient not a candidate for any acute thrombolytic therapy or any acute thrombectomy as his NIH stroke scale is 0   --CTA head/neck negative  --Stroke team evaluated, continue aspirin  --MRI brain with symmetric nonspecific decreased signal intensity within the basal ganglia.  --Echo EF 47%, neg saline test  --F/u  in 6 weeks     Hx of HTN now with hypotension  --add midodrine to allow dosing of metoprolol        A-fib RVR  Hx HTN/HLD/CAD  -- Continue metoprolol 12.5 mg twice daily, midodrine added  --HR remains controlled currently  --Cont ASA.  Ideally would add anticoagulation once hematuria better  --Echo neg saline test--MRI not concerning for cardioembolic process.    --Would be a good candidate for watchman procedure, can follow-up outpatient     Bilateral pneumonia  Recent Urine Legionella POSITIVE (6/11/2024)  --Per CTA chest.  Recent hx of PNA and was treated.  No signs or sx of active infection.  --Still + for legionella, but no sx.  Monitor off abx.   --PRN nebs     Hx AAA  -- Reports had surgery several years ago.  -- CT A/P at [place] showed stable appearing infra renal abdominal aortic aneurysm with extension into the common iliac arteries.     Hx DM type II (A1c from 7/30/2024 was 9.2)  -- Reports now diet controlled, not on any meds  -- Continue low-dose SSI     COPD  Chronic tobacco use     ETOH use  --reports one shot of whiskey daily   --monitor off CIWA     Hypothyroidism  --TSH is elevated-but trending in the right direction.  Per pharmacy fill records it looks like this was just started on 8/1.  Continue current dose of Synthroid.     Expected Discharge Location and Transportation: home  Expected Discharge   Expected Discharge Date: 8/12/2024; Expected  Discharge Time:      VTE Prophylaxis:  Mechanical VTE prophylaxis orders are present.         AM-PAC 6 Clicks Score (PT): 20 (08/09/24 0800)    CODE STATUS:   Code Status and Medical Interventions: CPR (Attempt to Resuscitate); Full Support   Ordered at: 08/04/24 1722     Level Of Support Discussed With:    Patient     Code Status (Patient has no pulse and is not breathing):    CPR (Attempt to Resuscitate)     Medical Interventions (Patient has pulse or is breathing):    Full Support       Delmi Zavaleta,   08/09/24

## 2024-08-09 NOTE — THERAPY TREATMENT NOTE
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 functional swallow) 1 week  -CH      Progress/Outcomes (Demonstrate functional swallow) good progress toward goal  -      Comment (Demonstrate functional swallow) No overt clinical s/s of aspiration with regular or thin liquids via spoon, cup or straw. Mildly prolonged mastication of regular solids d/t missing dentition, however grossly WFL. Pt. declined soft solids or chopped meats.  -CH         (STG) Patient will tolerate trials of    Consistencies Trialed (Tolerate trials) regular textures;thin liquids  -      Desired Outcome (Tolerate trials) without signs/symptoms of aspiration  -      [place] (Tolerate trials) independently (over 90% accuracy)  -      Time Frame (Tolerate trials) 1 week  -      Progress/Outcomes (Tolerate trials) good progress toward goal  -      Comment (Tolerate trials) No overt clinical s/s of aspiration with regular or thin liquids via spoon, cup or straw. Mildly prolonged mastication of regular solids d/t missing dentition, however grossly WFL. Pt. declined soft solids or chopped meats.  -         Patient will demonstrate functional  cognitive-linguistic skills for return to discharge environment    [place] Independently  -      Time frame 1 week  -CH         SLP Diagnostic Treatment     Patient will participate in further assessment in the following areas clarification of baseline cognitive communication status  -      Time Frame (Diagnostic) 1 week  -CH      Progress/Outcomes (Additional Goal 1, SLP) goal ongoing  -CH      Comment (Diagnostic) memory goal added.  -         Memory Skills Goal 1 (SLP)    Improve Memory Skills Through Goal 1 (SLP) recalling unrelated word lists immediately;recalling unrelated word lists with an imposed delay;90%;with minimal cues (75-90%)  -      Time Frame (Memory Skills Goal 1, SLP) 1 week  -CH      Progress (Memory Skills Goal 1, SLP) 70%;with minimal cues (75-90%)  -      Progress/Outcomes (Memory Skills Goal 1, SLP) continuing progress toward goal  -         Executive Functional Skills Goal 1 (SLP)    Improve Executive Function Skills Goal 1 (SLP) demonstrate awareness of deficit;identify strategies, strengths, limitations;identify anticipated needs;home management activity;100%;independently (over 90% accuracy)  -      Time Frame (Executive Function Skills Goal 1, SLP) 1 week  -CH      Progress (Executive Function Skills Goal 1, SLP) 80%;with minimal cues (75-90%)  -      Progress/Outcomes (Executive Function Skills Goal 1, SLP) continuing progress toward goal  -                User Key  (r) = Recorded By, (t) = Taken By, (c) = Cosigned By      Initials Name Provider Type    Lupe Lai MS CCC-SLP Speech and Language Pathologist                              Time Calculation:      Time Calculation- SLP       Row Name 08/09/24 1207             Time Calculation- SLP    SLP Start Time 1120  -      SLP Received On 08/09/24  -         Untimed Charges    62891-SD Treatment/ST Modification Prosth Aug Alter  39  -      65640-BR Treatment Swallow Minutes 30  -CH         Total  Minutes    Untimed Charges Total Minutes 69  -CH       Total Minutes 69  -CH                User Key  (r) = Recorded By, (t) = Taken By, (c) = Cosigned By      Initials Name Provider Type    Lupe Lai MS CCC-SLP Speech and Language Pathologist                    Therapy Charges for Today       Code Description Service Date Service Provider Modifiers Qty    82792649869 HC ST TREATMENT SWALLOW 2 8/9/2024 Lupe Flores MS CCC-SLP GN 1    58311072308 HC ST TREATMENT SPEECH 3 8/9/2024 Lupe Flores MS CCC-SLP GN 1                       MS STEFFI Dodge  8/9/2024

## 2024-08-10 LAB
ANION GAP SERPL CALCULATED.3IONS-SCNC: 6 MMOL/L (ref 5–15)
ANISOCYTOSIS BLD QL: NORMAL
BASOPHILS # BLD AUTO: 0.07 10*3/MM3 (ref 0–0.2)
BASOPHILS NFR BLD AUTO: 0.9 % (ref 0–1.5)
BUN SERPL-MCNC: 20 MG/DL (ref 8–23)
BUN/CREAT SERPL: 17.4 (ref 7–25)
CALCIUM SPEC-SCNC: 8.7 MG/DL (ref 8.6–10.5)
CHLORIDE SERPL-SCNC: 109 MMOL/L (ref 98–107)
CO2 SERPL-SCNC: 26 MMOL/L (ref 22–29)
CREAT SERPL-MCNC: 1.15 MG/DL (ref 0.76–1.27)
DACRYOCYTES BLD QL SMEAR: NORMAL
DEPRECATED RDW RBC AUTO: 70.8 FL (ref 37–54)
EGFRCR SERPLBLD CKD-EPI 2021: 66.8 ML/MIN/1.73
EOSINOPHIL # BLD AUTO: 0.15 10*3/MM3 (ref 0–0.4)
EOSINOPHIL NFR BLD AUTO: 1.8 % (ref 0.3–6.2)
ERYTHROCYTE [DISTWIDTH] IN BLOOD BY AUTOMATED COUNT: 18.5 % (ref 12.3–15.4)
GLUCOSE BLDC GLUCOMTR-MCNC: 119 MG/DL (ref 70–130)
GLUCOSE BLDC GLUCOMTR-MCNC: 135 MG/DL (ref 70–130)
GLUCOSE BLDC GLUCOMTR-MCNC: 149 MG/DL (ref 70–130)
GLUCOSE BLDC GLUCOMTR-MCNC: 86 MG/DL (ref 70–130)
GLUCOSE SERPL-MCNC: 110 MG/DL (ref 65–99)
HCT VFR BLD AUTO: 23 % (ref 37.5–51)
HGB BLD-MCNC: 7.2 G/DL (ref 13–17.7)
IMM GRANULOCYTES # BLD AUTO: 0.05 10*3/MM3 (ref 0–0.05)
IMM GRANULOCYTES NFR BLD AUTO: 0.6 % (ref 0–0.5)
LYMPHOCYTES # BLD AUTO: 1.14 10*3/MM3 (ref 0.7–3.1)
LYMPHOCYTES NFR BLD AUTO: 14.1 % (ref 19.6–45.3)
MACROCYTES BLD QL SMEAR: NORMAL
MCH RBC QN AUTO: 32.7 PG (ref 26.6–33)
MCHC RBC AUTO-ENTMCNC: 31.3 G/DL (ref 31.5–35.7)
MCV RBC AUTO: 104.5 FL (ref 79–97)
MONOCYTES # BLD AUTO: 0.6 10*3/MM3 (ref 0.1–0.9)
MONOCYTES NFR BLD AUTO: 7.4 % (ref 5–12)
NEUTROPHILS NFR BLD AUTO: 6.1 10*3/MM3 (ref 1.7–7)
NEUTROPHILS NFR BLD AUTO: 75.2 % (ref 42.7–76)
NRBC BLD AUTO-RTO: 0 /100 WBC (ref 0–0.2)
PLAT MORPH BLD: NORMAL
PLATELET # BLD AUTO: 211 10*3/MM3 (ref 140–450)
PMV BLD AUTO: 10 FL (ref 6–12)
POTASSIUM SERPL-SCNC: 3.6 MMOL/L (ref 3.5–5.2)
RBC # BLD AUTO: 2.2 10*6/MM3 (ref 4.14–5.8)
SODIUM SERPL-SCNC: 141 MMOL/L (ref 136–145)
WBC MORPH BLD: NORMAL
WBC NRBC COR # BLD AUTO: 8.11 10*3/MM3 (ref 3.4–10.8)

## 2024-08-10 PROCEDURE — 85025 COMPLETE CBC W/AUTO DIFF WBC: CPT | Performed by: INTERNAL MEDICINE

## 2024-08-10 PROCEDURE — 80048 BASIC METABOLIC PNL TOTAL CA: CPT | Performed by: INTERNAL MEDICINE

## 2024-08-10 PROCEDURE — 82948 REAGENT STRIP/BLOOD GLUCOSE: CPT

## 2024-08-10 PROCEDURE — 99232 SBSQ HOSP IP/OBS MODERATE 35: CPT | Performed by: INTERNAL MEDICINE

## 2024-08-10 PROCEDURE — 85007 BL SMEAR W/DIFF WBC COUNT: CPT | Performed by: INTERNAL MEDICINE

## 2024-08-10 RX ADMIN — Medication 10 ML: at 08:46

## 2024-08-10 RX ADMIN — Medication 10 ML: at 21:19

## 2024-08-10 RX ADMIN — MIDODRINE HYDROCHLORIDE 5 MG: 5 TABLET ORAL at 08:46

## 2024-08-10 RX ADMIN — ASPIRIN 81 MG: 81 TABLET, COATED ORAL at 08:46

## 2024-08-10 RX ADMIN — MIDODRINE HYDROCHLORIDE 5 MG: 5 TABLET ORAL at 17:36

## 2024-08-10 RX ADMIN — CYANOCOBALAMIN TAB 1000 MCG 1000 MCG: 1000 TAB at 08:46

## 2024-08-10 RX ADMIN — LEVOTHYROXINE SODIUM 50 MCG: 0.05 TABLET ORAL at 05:26

## 2024-08-10 RX ADMIN — ATORVASTATIN CALCIUM 40 MG: 40 TABLET, FILM COATED ORAL at 21:18

## 2024-08-10 RX ADMIN — PANTOPRAZOLE SODIUM 40 MG: 40 TABLET, DELAYED RELEASE ORAL at 05:26

## 2024-08-10 NOTE — PROGRESS NOTES
[facility] Medicine Services  PROGRESS NOTE    Patient Name: Stephane Noe  : 1950  MRN: [id number]    Date of Admission: 2024  Primary Care Physician: Nain Morel MD    Subjective   Subjective     CC:  Hematuria    HPI:  Feeling good, eating breakfast      Objective   Objective     Vital Signs:   Temp:  [97.8 °F (36.6 °C)-98.1 °F (36.7 °C)] 98.1 °F (36.7 °C)  Heart Rate:  [64-81] 71  Resp:  [16-18] 18  BP: ()/(50-90) 121/76     Physical Exam:  Constitutional: No acute distress, awake, alert  HENT: NCAT, mucous membranes moist  Respiratory: Clear to auscultation bilaterally, respiratory effort normal   Cardiovascular: Irregular, rate controlled  Gastrointestinal: Soft, [place]  Musculoskeletal: No bilateral ankle edema  Psychiatric: Appropriate affect, cooperative  Neurologic: Oriented x 3, speech clear  Skin: No rashes      Results Reviewed:  LAB RESULTS:      Lab 08/10/24  1030 24  0756 24  1126 24  1647 24  1341 24  1250 24  0950   WBC 8.11 7.80 7.83 8.18  --   --  8.35   HEMOGLOBIN 7.2* 8.4* 8.5* 9.5*  --   --  10.5*   HEMATOCRIT 23.0* 27.0* 27.0* 30.2*  --   --  32.6*   PLATELETS 211 161 159 197  --   --  223   NEUTROS ABS 6.10 5.81 6.37 6.56  --   --  5.70   IMMATURE GRANS (ABS) 0.05 0.06* 0.04 0.06*  --   --  0.08*   LYMPHS ABS 1.14 1.23 0.78 0.86  --   --  1.76   MONOS ABS 0.60 0.56 0.58 0.64  --   --  0.66   EOS ABS 0.15 0.10 0.04 0.03  --   --  0.10   .5* 105.5* 104.7* 102.4*  --   --  102.2*   SED RATE  --   --  40*  --   --   --   --    PROCALCITONIN  --   --   --   --   --   --  0.23   LACTATE  --   --   --  1.7  --  3.5*  --    PROTIME  --   --   --   --   --   --  13.3   APTT  --   --   --   --   --   --  31.3*   HEPARIN ANTI-XA  --   --   --   --  0.10*  --   --          Lab 08/10/24  1030 24  0632 24  0756 24  1126 24  1647 24  0950   SODIUM 141 140 139 137 134* 134*    POTASSIUM 3.6 4.1 4.0 4.5 4.8 4.6   CHLORIDE 109* 108* 107 107 104 102   CO2 26.0 20.0* 24.0 20.0* 19.0* 18.5*   ANION GAP 6.0 12.0 8.0 10.0 11.0 13.5   BUN 20 14 15 16 19 19   CREATININE 1.15 1.18 1.42* 1.50* 1.38* 1.48*   EGFR 66.8 64.8 51.9* 48.6* 53.7* 49.3*   GLUCOSE 110* 86 73 113* 82 85   CALCIUM 8.7 8.6 8.3* 8.3* 8.2* 9.5   MAGNESIUM  --  1.6 1.8  --   --  1.8   TSH  --   --   --  11.530*  --   --          Lab 08/04/24  1647 08/04/24  0950   TOTAL PROTEIN 5.5* 6.9   ALBUMIN 2.6* 3.4*   GLOBULIN 2.9 3.5   ALT (SGPT) <5 5   AST (SGOT) 11 12   BILIRUBIN 0.4 0.2   ALK PHOS 75 97         Lab 08/04/24  1647 08/04/24  1211 08/04/24  0950   HSTROP T 87* 95* 91*   PROTIME  --   --  13.3   INR  --   --  0.97         Lab 08/05/24  1126   CHOLESTEROL 117   LDL CHOL 57   HDL CHOL 42   TRIGLYCERIDES 96         Lab 08/05/24  1126 08/04/24  1258   FOLATE 8.30  --    VITAMIN B 12 602  --    ABO TYPING  --  A   RH TYPING  --  Positive   ANTIBODY SCREEN  --  Negative         Brief Urine Lab Results  (Last result in the past 365 days)        Color   Clarity   Blood   Leuk Est   Nitrite   Protein   CREAT   Urine HCG        08/04/24 1717 Red   Turbid   Large (3+)   Moderate (2+)   Positive   >=300 mg/dL (3+)                   Microbiology Results Abnormal       Procedure Component Value - Date/Time    Urine Culture - Urine, Urine, Clean Catch [[id number]] Collected: 08/04/24 1717    Lab Status: Final result Specimen: Urine, Clean Catch Updated: 08/05/24 2051     Urine Culture <10,000 CFU/mL Normal Urogenital Jayne    Narrative:      Colonization of the urinary tract without infection is common. Treatment is discouraged unless the patient is symptomatic, pregnant, or undergoing an invasive urologic procedure.    S. Pneumo Ag Urine or CSF - Urine, Urine, Clean Catch [[id number]]  (Normal) Collected: 08/04/24 1717    Lab Status: Final result Specimen: Urine, Clean Catch Updated: 08/04/24 2155     Strep Pneumo Ag Negative    MRSA  Screen, PCR (Inpatient) - Swab, Nares [[id number]]  (Normal) Collected: 08/04/24 1820    Lab Status: Final result Specimen: Swab from Nares Updated: 08/04/24 1935     MRSA PCR Negative    Narrative:      The negative predictive value of this diagnostic test is high and should only be used to consider de-escalating anti-MRSA therapy. A positive result may indicate colonization with MRSA and must be correlated clinically.  MRSA Negative    Respiratory Panel PCR w/COVID-19(SARS-CoV-2) MORALES/DUNG/ERWIN/PAD/COR/ANDIE In-House, NP Swab in UTM/VTM, 2 HR TAT - Swab, Nasopharynx [[id number]]  (Normal) Collected: 08/04/24 1716    Lab Status: Final result Specimen: Swab from Nasopharynx Updated: 08/04/24 1804     ADENOVIRUS, PCR Not Detected     Coronavirus 229E Not Detected     Coronavirus HKU1 Not Detected     Coronavirus NL63 Not Detected     Coronavirus OC43 Not Detected     COVID19 Not Detected     Human Metapneumovirus Not Detected     Human Rhinovirus/Enterovirus Not Detected     Influenza A PCR Not Detected     Influenza B PCR Not Detected     Parainfluenza Virus 1 Not Detected     Parainfluenza Virus 2 Not Detected     Parainfluenza Virus 3 Not Detected     Parainfluenza Virus 4 Not Detected     RSV, PCR Not Detected     Bordetella pertussis pcr Not Detected     Bordetella parapertussis PCR Not Detected     Chlamydophila pneumoniae PCR Not Detected     Mycoplasma pneumo by PCR Not Detected    Narrative:      In the setting of a positive respiratory panel with a viral infection PLUS a negative procalcitonin without other underlying concern for bacterial infection, consider observing off antibiotics or discontinuation of antibiotics and continue supportive care. If the respiratory panel is positive for atypical bacterial infection (Bordetella pertussis, Chlamydophila pneumoniae, or Mycoplasma pneumoniae), consider antibiotic de-escalation to target atypical bacterial infection.            No radiology results from the last 24  hrs    Results for orders placed during the hospital encounter of 24    Adult Transthoracic Echo Complete W/ Cont if Necessary Per Protocol    Interpretation Summary    Left ventricular systolic function is low normal. Calculated left ventricular EF = 47.9% Left ventricular ejection fraction appears to be 46 - 50%.    Saline test results are negative.  Normal bubble study.    Moderate mitral valve regurgitation is present.    Biatrial enlargement noted.  Correlate with any other signs of amyloidosis.    The right ventricular cavity moderately is dilated.      Current medications:  Scheduled Meds:aspirin, 81 mg, Oral, Daily  atorvastatin, 40 mg, Oral, Nightly  insulin lispro, 2-7 Units, Subcutaneous, 4x Daily AC & at Bedtime  levothyroxine, 50 mcg, Oral, Q AM  [Held by provider] metoprolol tartrate, 12.5 mg, Oral, Q12H  midodrine, 5 mg, Oral, BID AC  nicotine, 1 patch, Transdermal, Q24H  pantoprazole, 40 mg, Oral, Q AM  sodium chloride, 10 mL, Intravenous, Q12H  vitamin B-12, 1,000 mcg, Oral, Daily      Continuous Infusions:lactated ringers, 9 mL/hr, Last Rate: 9 mL/hr (24 1019)      PRN Meds:.  acetaminophen **OR** acetaminophen **OR** acetaminophen    senna-docusate sodium **AND** polyethylene glycol **AND** bisacodyl **AND** bisacodyl    dextrose    dextrose    glucagon (human recombinant)    ipratropium-albuterol    lactated ringers    melatonin    [] Morphine **AND** naloxone    nicotine polacrilex    sodium chloride    sodium chloride    Assessment & Plan   Assessment & Plan     Active Hospital Problems    Diagnosis  POA    **Gross hematuria [R31.0]  Yes    Transient ischemic attack (TIA) [G45.9]  Yes    Bladder mass [N32.89]  Yes    Acute UTI (urinary tract infection) [N39.0]  Yes    Bilateral pneumonia [J18.9]  Yes    Abdominal aortic aneurysm (AAA) without rupture [I71.40]  Yes    History of oropharyngeal cancer [Z85.819]  Yes    History of lung cancer [Z85.118]  Not Applicable    History  of prostate cancer [Z85.46]  Not Applicable    Physical deconditioning [R53.81]  Yes    History of laminectomy [Z98.890]  Not Applicable    Anemia [D64.9]  Yes    Cigarette smoker [F17.210]  Yes    CAD (coronary artery disease) [I25.10]  Yes    Primary hypertension [I10]  Yes    Type 2 diabetes mellitus with diabetic polyneuropathy, without long-term current use of insulin [E11.42]  Yes    Atrial fibrillation with rapid ventricular response [I48.91]  Yes      Resolved Hospital Problems   No resolved problems to display.        Brief Hospital Course to date:  Stephane Noe is a 73 y.o. male  with past medical history significant for atrial fib (prior on Eliquis but discontinued 4-5 months ago due to recurrent hematuria episodes), HTN, HLD, CAD, COPD and ongoing tobacco use, DM type II, prostate cancer, right lung cancer s/p XRT, AAA repair, recent extensive hospitalization at [facility] x 11 weeks due to LAY, required bilateral nephrostomy tubes for hydronephrosis and pneumonia.  Treated for Legionella at that time with azithro.  Developed gross hematuria again on 8/3 that remained persistent and presented first to [facility] then transferred here with concern for possible stroke.       While in the ED, post CT of the abdomen patient developed aphasia and code stroke was called.  Also found to be in A-fib RVR with hypotension.  Started on heparin drip and diltiazem in ER.  Given vancomycin and Zosyn.  Dr. Mayfield with neurology at [facility] spoke with [facility] stroke team.  They also spoke with Dr. Jernigan with urology regarding new bladder mass and hematuria, and Dr. Espinoza with cardiology regarding A-fib RVR.  He underwent cystoscopy with urology on 8/8 with evacuation of bladder clots and hemostasis of bladder lesion.           Gross hematuria, resolved  Bladder mass with hydro  Hx recent LAY and hydronephrosis s/p bilateral nephrostomy tubes Hx prostate cancer s/p radiation    -Urology evaluated, s/p cystoscopy on 8/8.  CBI discontinued, pull [place] today  -No anticoagulation for now  -Urine Cx neg  -Blood Cx neg  -renal function improved    Anemia  -Suspect secondary to significant hematuria  -Repeat CBC in a.m., transfuse if less than 7     Aphasia, resolved  Rule out CVA, TIA  --Seen by Tele-Neurologist Dr Mayfield at [facility]  -- Patient not a candidate for any acute thrombolytic therapy or any acute thrombectomy as his NIH stroke scale is 0   --CTA head/neck negative  --Stroke team evaluated, continue aspirin  --MRI brain with symmetric nonspecific decreased signal intensity within the basal ganglia.  --Echo EF 47%, neg saline test  --F/u  in 6 weeks     Hx of HTN now with hypotension  --added midodrine to allow dosing of metoprolol        A-fib RVR  Hx HTN/HLD/CAD  -- Continue metoprolol 12.5 mg twice daily, midodrine added  --HR remains controlled currently  --Cont ASA.  Ideally would add anticoagulation once hematuria better  --Echo neg saline test--MRI not concerning for cardioembolic process.    --Would be a good candidate for watchman procedure, can follow-up outpatient     Bilateral pneumonia  Recent Urine Legionella POSITIVE (6/11/2024)  --Per CTA chest.  Recent hx of PNA and was treated.  No signs or sx of active infection.  --Still + for legionella, but no sx.  Monitor off abx.   --PRN nebs     Hx AAA  -- Reports had surgery several years ago.  -- CT A/P at [place] showed stable appearing infra renal abdominal aortic aneurysm with extension into the common iliac arteries.     Hx DM type II (A1c from 7/30/2024 was 9.2)  -- Reports now diet controlled, not on any meds  -- Continue low-dose SSI     COPD  Chronic tobacco use     ETOH use  --reports one shot of whiskey daily   --monitor off CIWA     Hypothyroidism  --TSH is elevated-but trending in the right direction.  Per pharmacy fill records it looks like this was just started on 8/1.  Continue current dose of Synthroid.        Expected Discharge Location and Transportation: Home  Expected Discharge   Expected Discharge Date: 8/12/2024; Expected Discharge Time:      VTE Prophylaxis:  Mechanical VTE prophylaxis orders are present.         AM-PAC 6 Clicks Score (PT): 20 (08/10/24 0800)    CODE STATUS:   Code Status and Medical Interventions: CPR (Attempt to Resuscitate); Full Support   Ordered at: 08/04/24 1722     Level Of Support Discussed With:    Patient     Code Status (Patient has no pulse and is not breathing):    CPR (Attempt to Resuscitate)     Medical Interventions (Patient has pulse or is breathing):    Full Support       Delmi Zavaleta,   08/10/24

## 2024-08-10 NOTE — PROGRESS NOTES
"Daily Progress Note    Patient: Stephane JOSH [facility] Day: 6    Subjective: Resting comfortably.      Objective:     /71   Pulse 81   Temp 97.8 °F (36.6 °C) (Oral)   Resp 16   Ht 182.9 cm (72.01\")   Wt 71.3 kg (157 lb 3 oz)   SpO2 94%   BMI 21.31 kg/m²       Intake/Output Summary (Last 24 hours) at 8/10/2024 1011  Last data filed at 8/9/2024 1516  Gross per 24 hour   Intake 500 ml   Output --   Net 500 ml   Urine is blood-tinged only.    Review of Systems:    Physical Exam:   General Appearance: alert, appears stated age and cooperative  Head: normocephalic, without obvious abnormality and atraumatic  Lungs respirations regular  Abdomen no masses and soft non tender      Lab Results (last 24 hours)       Procedure Component Value Units Date/Time    POC Glucose Once [[id number]]  (Normal) Collected: 08/10/24 0721    Specimen: Blood Updated: 08/10/24 0725     Glucose 86 mg/dL     POC Glucose Once [[id number]]  (Abnormal) Collected: 08/09/24 1933    Specimen: Blood Updated: 08/09/24 1934     Glucose 152 mg/dL     POC Glucose Once [[id number]]  (Normal) Collected: 08/09/24 1707    Specimen: Blood Updated: 08/09/24 1708     Glucose 111 mg/dL     POC Glucose Once [[id number]]  (Abnormal) Collected: 08/09/24 1138    Specimen: Blood Updated: 08/09/24 1139     Glucose 145 mg/dL           Assessment/Plan: Postop day #2 from cystoscopy with clot evacuation and fulguration of bladder.  Doing well.  Will stop CBI          Gross hematuria    Atrial fibrillation with rapid ventricular response    Type 2 diabetes mellitus with diabetic polyneuropathy, without long-term current use of insulin    Primary hypertension    CAD (coronary artery disease)    Cigarette smoker    Anemia    History of laminectomy    Physical deconditioning    History of oropharyngeal cancer    History of prostate cancer    History of lung cancer    Transient ischemic attack (TIA)    Bladder mass    Acute UTI (urinary tract infection)    " Bilateral pneumonia    Abdominal aortic aneurysm (AAA) without rupture        Diagnosis Plan   1. Transient ischemic attack (TIA)  Ambulatory Referral to Home Health      2. Neurological deficit, transient  Ambulatory Referral to Neurology      3. Cognitive communication deficit              Osmin Cross MD - 8/10/2024, 10:11 EDT

## 2024-08-11 ENCOUNTER — READMISSION MANAGEMENT (OUTPATIENT)
Dept: CALL CENTER | Facility: HOSPITAL | Age: 74
End: 2024-08-11
Payer: MEDICARE

## 2024-08-11 VITALS
HEIGHT: 72 IN | DIASTOLIC BLOOD PRESSURE: 66 MMHG | OXYGEN SATURATION: 96 % | WEIGHT: 157.19 LBS | RESPIRATION RATE: 16 BRPM | TEMPERATURE: 98.2 F | HEART RATE: 104 BPM | SYSTOLIC BLOOD PRESSURE: 102 MMHG | BODY MASS INDEX: 21.29 KG/M2

## 2024-08-11 LAB
ALBUMIN SERPL-MCNC: 2.5 G/DL (ref 3.5–5.2)
ANION GAP SERPL CALCULATED.3IONS-SCNC: 6 MMOL/L (ref 5–15)
BASOPHILS # BLD AUTO: 0.06 10*3/MM3 (ref 0–0.2)
BASOPHILS NFR BLD AUTO: 0.7 % (ref 0–1.5)
BUN SERPL-MCNC: 21 MG/DL (ref 8–23)
BUN/CREAT SERPL: 18.4 (ref 7–25)
CALCIUM SPEC-SCNC: 8.4 MG/DL (ref 8.6–10.5)
CHLORIDE SERPL-SCNC: 108 MMOL/L (ref 98–107)
CO2 SERPL-SCNC: 26 MMOL/L (ref 22–29)
CREAT SERPL-MCNC: 1.14 MG/DL (ref 0.76–1.27)
DEPRECATED RDW RBC AUTO: 68.6 FL (ref 37–54)
EGFRCR SERPLBLD CKD-EPI 2021: 67.5 ML/MIN/1.73
EOSINOPHIL # BLD AUTO: 0.14 10*3/MM3 (ref 0–0.4)
EOSINOPHIL NFR BLD AUTO: 1.6 % (ref 0.3–6.2)
ERYTHROCYTE [DISTWIDTH] IN BLOOD BY AUTOMATED COUNT: 18.1 % (ref 12.3–15.4)
GLUCOSE BLDC GLUCOMTR-MCNC: 139 MG/DL (ref 70–130)
GLUCOSE BLDC GLUCOMTR-MCNC: 97 MG/DL (ref 70–130)
GLUCOSE SERPL-MCNC: 83 MG/DL (ref 65–99)
HCT VFR BLD AUTO: 23.9 % (ref 37.5–51)
HGB BLD-MCNC: 7.5 G/DL (ref 13–17.7)
IMM GRANULOCYTES # BLD AUTO: 0.05 10*3/MM3 (ref 0–0.05)
IMM GRANULOCYTES NFR BLD AUTO: 0.6 % (ref 0–0.5)
LYMPHOCYTES # BLD AUTO: 1.19 10*3/MM3 (ref 0.7–3.1)
LYMPHOCYTES NFR BLD AUTO: 13.8 % (ref 19.6–45.3)
MCH RBC QN AUTO: 32.6 PG (ref 26.6–33)
MCHC RBC AUTO-ENTMCNC: 31.4 G/DL (ref 31.5–35.7)
MCV RBC AUTO: 103.9 FL (ref 79–97)
MONOCYTES # BLD AUTO: 0.54 10*3/MM3 (ref 0.1–0.9)
MONOCYTES NFR BLD AUTO: 6.3 % (ref 5–12)
NEUTROPHILS NFR BLD AUTO: 6.65 10*3/MM3 (ref 1.7–7)
NEUTROPHILS NFR BLD AUTO: 77 % (ref 42.7–76)
NRBC BLD AUTO-RTO: 0 /100 WBC (ref 0–0.2)
PHOSPHATE SERPL-MCNC: 2.8 MG/DL (ref 2.5–4.5)
PLATELET # BLD AUTO: 239 10*3/MM3 (ref 140–450)
PMV BLD AUTO: 9.7 FL (ref 6–12)
POTASSIUM SERPL-SCNC: 3.4 MMOL/L (ref 3.5–5.2)
RBC # BLD AUTO: 2.3 10*6/MM3 (ref 4.14–5.8)
SODIUM SERPL-SCNC: 140 MMOL/L (ref 136–145)
WBC NRBC COR # BLD AUTO: 8.63 10*3/MM3 (ref 3.4–10.8)

## 2024-08-11 PROCEDURE — 97530 THERAPEUTIC ACTIVITIES: CPT

## 2024-08-11 PROCEDURE — 80069 RENAL FUNCTION PANEL: CPT | Performed by: INTERNAL MEDICINE

## 2024-08-11 PROCEDURE — 82948 REAGENT STRIP/BLOOD GLUCOSE: CPT

## 2024-08-11 PROCEDURE — 99239 HOSP IP/OBS DSCHRG MGMT >30: CPT | Performed by: INTERNAL MEDICINE

## 2024-08-11 PROCEDURE — 85025 COMPLETE CBC W/AUTO DIFF WBC: CPT | Performed by: INTERNAL MEDICINE

## 2024-08-11 PROCEDURE — 97116 GAIT TRAINING THERAPY: CPT

## 2024-08-11 RX ORDER — ASPIRIN 81 MG/1
81 TABLET ORAL DAILY
Qty: 30 TABLET | Refills: 0 | Status: SHIPPED | OUTPATIENT
Start: 2024-08-12 | End: 2024-09-11

## 2024-08-11 RX ORDER — ATORVASTATIN CALCIUM 40 MG/1
40 TABLET, FILM COATED ORAL NIGHTLY
Qty: 90 TABLET | Refills: 0 | Status: SHIPPED | OUTPATIENT
Start: 2024-08-11 | End: 2024-11-09

## 2024-08-11 RX ORDER — POTASSIUM CHLORIDE 750 MG/1
40 CAPSULE, EXTENDED RELEASE ORAL ONCE
Status: COMPLETED | OUTPATIENT
Start: 2024-08-11 | End: 2024-08-11

## 2024-08-11 RX ADMIN — CYANOCOBALAMIN TAB 1000 MCG 1000 MCG: 1000 TAB at 08:27

## 2024-08-11 RX ADMIN — ASPIRIN 81 MG: 81 TABLET, COATED ORAL at 08:27

## 2024-08-11 RX ADMIN — LEVOTHYROXINE SODIUM 50 MCG: 0.05 TABLET ORAL at 05:41

## 2024-08-11 RX ADMIN — MIDODRINE HYDROCHLORIDE 5 MG: 5 TABLET ORAL at 08:27

## 2024-08-11 RX ADMIN — POTASSIUM CHLORIDE 40 MEQ: 750 CAPSULE, EXTENDED RELEASE ORAL at 13:43

## 2024-08-11 NOTE — DISCHARGE SUMMARY
[facility]  DISCHARGE SUMMARY    Patient Name: Stephane Noe  : 1950  MRN: [id number]    Date of Admission: 2024  4:05 PM  Date of Discharge: 2024  Primary Care Physician: Nain Moerl MD    Consults       Date and Time Order Name Status Description    2024  5:14 PM Inpatient Neurology Consult Stroke Completed     2024  5:14 PM Inpatient Urology Consult      2024  5:14 PM Inpatient Cardiology Consult Completed             Hospital Course     Presenting Problem: AMS    Active Hospital Problems    Diagnosis  POA   • **Gross hematuria [R31.0]  Yes   • Transient ischemic attack (TIA) [G45.9]  Yes   • Bladder mass [N32.89]  Yes   • Acute UTI (urinary tract infection) [N39.0]  Yes   • Bilateral pneumonia [J18.9]  Yes   • Abdominal aortic aneurysm (AAA) without rupture [I71.40]  Yes   • History of oropharyngeal cancer [Z85.819]  Yes   • History of lung cancer [Z85.118]  Not Applicable   • History of prostate cancer [Z85.46]  Not Applicable   • Physical deconditioning [R53.81]  Yes   • History of laminectomy [Z98.890]  Not Applicable   • Anemia [D64.9]  Yes   • Cigarette smoker [F17.210]  Yes   • CAD (coronary artery disease) [I25.10]  Yes   • Primary hypertension [I10]  Yes   • Type 2 diabetes mellitus with diabetic polyneuropathy, without long-term current use of insulin [E11.42]  Yes   • Atrial fibrillation with rapid ventricular response [I48.91]  Yes      Resolved Hospital Problems   No resolved problems to display.          Hospital Course:  Stephane Noe is a 73 y.o. male with past medical history significant for atrial fib (prior on Eliquis but discontinued 4-5 months ago due to recurrent hematuria episodes), HTN, HLD, CAD, COPD and ongoing tobacco use, DM type II, prostate cancer, right lung cancer s/p XRT, AAA repair, recent extensive hospitalization at [facility] x 11 weeks due to LAY, required bilateral nephrostomy tubes for  hydronephrosis and pneumonia.  Treated for Legionella at that time with azithro.  Developed gross hematuria again on 8/3 that remained persistent and presented first to [facility] then transferred here with concern for possible stroke.       While in the ED, post CT of the abdomen patient developed aphasia and code stroke was called.  Also found to be in A-fib RVR with hypotension.  Started on heparin drip and diltiazem in ER.  Given vancomycin and Zosyn.  Dr. Mayfield with neurology at [facility] spoke with [facility] stroke team.  They also spoke with Dr. Jernigan with urology regarding new bladder mass and hematuria, and Dr. Espinoza with cardiology regarding A-fib RVR.  He underwent cystoscopy with urology on 8/8 with evacuation of bladder clots and hemostasis of bladder lesion.        Gross hematuria, resolved  Bladder mass with hydro  Hx recent LAY and hydronephrosis s/p bilateral nephrostomy tubes Hx prostate cancer s/p radiation   -Urology evaluated, s/p cystoscopy on 8/8.  CBI discontinued, and Guaman removed.  Has been voiding without difficulty prior to DC  -No anticoagulation for now  -Urine Cx neg  -Blood Cx neg  -renal function improved     Anemia  -Suspect secondary to significant hematuria     Aphasia, resolved  Rule out CVA, TIA  --Seen by Tele-Neurologist Dr Mayfield at [facility]  -- Patient not a candidate for any acute thrombolytic therapy or any acute thrombectomy as his NIH stroke scale is 0   --CTA head/neck negative  --Stroke team evaluated, continue aspirin  --MRI brain with symmetric nonspecific decreased signal intensity within the basal ganglia.  --Echo EF 47%, neg saline test  --F/u  in 6 weeks     Hx of HTN now with hypotension  -- Continue metoprolol once daily as below        A-fib RVR  Hx HTN/HLD/CAD  -- Continue metoprolol 12.5 mg daily, should be dosed twice daily but has issue with blood pressure and without beta-blocker his heart rate shoots into the  130s-140s    --Cont ASA.  Ideally would add anticoagulation once hematuria better  --Echo neg saline test  --MRI not concerning for cardioembolic process.    --Would be a good candidate for watchman procedure, can follow-up outpatient     Bilateral pneumonia  Recent Urine Legionella POSITIVE (6/11/2024)  --Per CTA chest.  Recent hx of PNA and was treated.  No signs or sx of active infection.       Hx AAA  -- Reports had surgery several years ago.  -- CT A/P at [facility] showed stable appearing infra renal abdominal aortic aneurysm with extension into the common iliac arteries.     Hx DM type II (A1c from 7/30/2024 was 9.2)  -- Reports now diet controlled, not on any meds  -- Continue low-dose SSI     COPD  Chronic tobacco use     ETOH use  --reports one shot of whiskey daily   --monitor off CIWA     Hypothyroidism  -- Continue Synthroid, needs repeat TSH in 5 weeks    Potassium replaced prior to DC    Discharge Follow Up Recommendations for outpatient labs/diagnostics:  PCP in 1 week    Day of Discharge     HPI:   Doing well, voiding without difficulty.  Wants to go home    Review of Systems  Gen- No fevers, chills  CV- No chest pain, palpitations  Resp- No cough, dyspnea  GI- No N/V/D, abd pain      Vital Signs:   Temp:  [98 °F (36.7 °C)-98.5 °F (36.9 °C)] 98.2 °F (36.8 °C)  Heart Rate:  [] 104  Resp:  [16-18] 16  BP: (102-118)/(62-72) 102/66      Physical Exam:  Constitutional: No acute distress, awake, alert  HENT: NCAT, mucous membranes moist  Respiratory: Clear to auscultation bilaterally, respiratory effort normal   Cardiovascular: irregular  Gastrointestinal: soft, nontender, nondistended  Musculoskeletal: No LE edema  Psychiatric: Appropriate affect, cooperative  Neurologic: Oriented x 3, speech clear  Skin: No rashes      Pertinent  and/or Most Recent Results     LAB RESULTS:      Lab 08/11/24  0619 08/10/24  1030 08/06/24  0756 08/05/24  1126 08/04/24  1647 08/04/24  1341   WBC 8.63 8.11 7.80 7.83  8.18  --    HEMOGLOBIN 7.5* 7.2* 8.4* 8.5* 9.5*  --    HEMATOCRIT 23.9* 23.0* 27.0* 27.0* 30.2*  --    PLATELETS 239 211 161 159 197  --    NEUTROS ABS 6.65 6.10 5.81 6.37 6.56  --    IMMATURE GRANS (ABS) 0.05 0.05 0.06* 0.04 0.06*  --    LYMPHS ABS 1.19 1.14 1.23 0.78 0.86  --    MONOS ABS 0.54 0.60 0.56 0.58 0.64  --    EOS ABS 0.14 0.15 0.10 0.04 0.03  --    .9* 104.5* 105.5* 104.7* 102.4*  --    SED RATE  --   --   --  40*  --   --    LACTATE  --   --   --   --  1.7  --    HEPARIN ANTI-XA  --   --   --   --   --  0.10*         Lab 08/11/24  0619 08/10/24  1030 08/07/24  0632 08/06/24  0756 08/05/24  1126   SODIUM 140 141 140 139 137   POTASSIUM 3.4* 3.6 4.1 4.0 4.5   CHLORIDE 108* 109* 108* 107 107   CO2 26.0 26.0 20.0* 24.0 20.0*   ANION GAP 6.0 6.0 12.0 8.0 10.0   BUN 21 20 14 15 16   CREATININE 1.14 1.15 1.18 1.42* 1.50*   EGFR 67.5 66.8 64.8 51.9* 48.6*   GLUCOSE 83 110* 86 73 113*   CALCIUM 8.4* 8.7 8.6 8.3* 8.3*   MAGNESIUM  --   --  1.6 1.8  --    PHOSPHORUS 2.8  --   --   --   --    TSH  --   --   --   --  11.530*         Lab 08/11/24  0619 08/04/24  1647   TOTAL PROTEIN  --  5.5*   ALBUMIN 2.5* 2.6*   GLOBULIN  --  2.9   ALT (SGPT)  --  <5   AST (SGOT)  --  11   BILIRUBIN  --  0.4   ALK PHOS  --  75         Lab 08/04/24  1647   HSTROP T 87*         Lab 08/05/24  1126   CHOLESTEROL 117   LDL CHOL 57   HDL CHOL 42   TRIGLYCERIDES 96         Lab 08/05/24  1126   FOLATE 8.30   VITAMIN B 12 602         Brief Urine Lab Results  (Last result in the past 365 days)        Color   Clarity   Blood   Leuk Est   Nitrite   Protein   CREAT   Urine HCG        08/04/24 1717 Red   Turbid   Large (3+)   Moderate (2+)   Positive   >=300 mg/dL (3+)                 Microbiology Results (last 10 days)       Procedure Component Value - Date/Time    MRSA Screen, PCR (Inpatient) - Swab, Nares [[id number]]  (Normal) Collected: 08/04/24 1820    Lab Status: Final result Specimen: Swab from Nares Updated: 08/04/24 1935      MRSA PCR Negative    Narrative:      The negative predictive value of this diagnostic test is high and should only be used to consider de-escalating anti-MRSA therapy. A positive result may indicate colonization with MRSA and must be correlated clinically.  MRSA Negative    Legionella Antigen, Urine - Urine, Urine, Clean Catch [[id number]]  (Abnormal) Collected: 08/04/24 1717    Lab Status: Final result Specimen: Urine, Clean Catch Updated: 08/04/24 2155     LEGIONELLA ANTIGEN, URINE Positive    S. Pneumo Ag Urine or CSF - Urine, Urine, Clean Catch [[id number]]  (Normal) Collected: 08/04/24 1717    Lab Status: Final result Specimen: Urine, Clean Catch Updated: 08/04/24 2155     Strep Pneumo Ag Negative    Urine Culture - Urine, Urine, Clean Catch [[id number]] Collected: 08/04/24 1717    Lab Status: Final result Specimen: Urine, Clean Catch Updated: 08/05/24 2051     Urine Culture <10,000 CFU/mL Normal Urogenital Jayne    Narrative:      Colonization of the urinary tract without infection is common. Treatment is discouraged unless the patient is symptomatic, pregnant, or undergoing an invasive urologic procedure.    Respiratory Panel PCR w/COVID-19(SARS-CoV-2) MORALES/DUNG/ERWIN/PAD/COR/ANDIE In-House, NP Swab in UTM/VTM, 2 HR TAT - Swab, Nasopharynx [[id number]]  (Normal) Collected: 08/04/24 1716    Lab Status: Final result Specimen: Swab from Nasopharynx Updated: 08/04/24 1804     ADENOVIRUS, PCR Not Detected     Coronavirus 229E Not Detected     Coronavirus HKU1 Not Detected     Coronavirus NL63 Not Detected     Coronavirus OC43 Not Detected     COVID19 Not Detected     Human Metapneumovirus Not Detected     Human Rhinovirus/Enterovirus Not Detected     Influenza A PCR Not Detected     Influenza B PCR Not Detected     Parainfluenza Virus 1 Not Detected     Parainfluenza Virus 2 Not Detected     Parainfluenza Virus 3 Not Detected     Parainfluenza Virus 4 Not Detected     RSV, PCR Not Detected     Bordetella pertussis pcr Not  Detected     Bordetella parapertussis PCR Not Detected     Chlamydophila pneumoniae PCR Not Detected     Mycoplasma pneumo by PCR Not Detected    Narrative:      In the setting of a positive respiratory panel with a viral infection PLUS a negative procalcitonin without other underlying concern for bacterial infection, consider observing off antibiotics or discontinuation of antibiotics and continue supportive care. If the respiratory panel is positive for atypical bacterial infection (Bordetella pertussis, Chlamydophila pneumoniae, or Mycoplasma pneumoniae), consider antibiotic de-escalation to target atypical bacterial infection.    Blood Culture - Blood, Hand, Right [[id number]]  (Normal) Collected: 08/04/24 1258    Lab Status: Final result Specimen: Blood from Hand, Right Updated: 08/09/24 1315     Blood Culture No growth at 5 days    Blood Culture - Blood, Hand, Left [[id number]]  (Normal) Collected: 08/04/24 1250    Lab Status: Final result Specimen: Blood from Hand, Left Updated: 08/09/24 1315     Blood Culture No growth at 5 days            Adult Transthoracic Echo Complete W/ Cont if Necessary Per Protocol    Result Date: 8/6/2024  •  Left ventricular systolic function is low normal. Calculated left ventricular EF = 47.9% Left ventricular ejection fraction appears to be 46 - 50%. •  Saline test results are negative.  Normal bubble study. •  Moderate mitral valve regurgitation is present. •  Biatrial enlargement noted.  Correlate with any other signs of amyloidosis. •  The right ventricular cavity moderately is dilated.     MRI Brain With & Without Contrast    Result Date: 8/5/2024  MRI BRAIN W WO CONTRAST Date of Exam: 8/4/2024 10:32 PM EDT Indication: Stroke, follow up stroke workup, possible bladder CA.  Comparison: 12/12/2019 Technique:  Routine multiplanar/multisequence sequence images of the brain were obtained before and after the uneventful administration of 10 cc Multihance. Findings: There is no  diffusion restriction to suggest acute infarct. There is no evidence of acute or chronic intracranial hemorrhage. No mass effect or midline shift. There is generalized cerebral atrophy. No abnormal extra-axial collections. Minimal progression of mild to moderate nonspecific white matter signal abnormality without mass effect nor enhancement suggestive of chronic microvascular ischemic disease. Symmetric nonspecific decreasing signal intensity within the basal ganglia which  may be senescent in etiology. Midline structures are intact. No significant cortical abnormality is identified. Calvarial and superficial soft tissue signal is within normal limits. Orbits appear unremarkable. The paranasal sinuses and the mastoid air cells appear well aerated. There is no abnormal intracranial enhancement. There is normal enhancement within the intracranial vasculature including the dural venous sinuses.     Impression: 1.No evidence of acute intracranial process nor metastatic disease. 2.Minimal progression of nonspecific white matter changes likely related to microvascular ischemic disease. 3.Symmetric nonspecific decreased signal intensity within the basal ganglia which can be senescent in nature. Electronically Signed: Steve Peace MD  8/5/2024 10:30 AM EDT  Workstation ID: [id number]    CT Angiogram Head    Result Date: 8/4/2024  PROCEDURE: CT ANGIOGRAM HEAD-, CT ANGIOGRAM NECK-  HISTORY: acute onset aphasia; R31.9-Hematuria, unspecified; I48.91-Unspecified atrial fibrillation; J18.9-Pneumonia, unspecified organism; R47.01-Aphasia; N39.0-Urinary tract infection, site not specified; R31.9-Hematuria, unspecified  TECHNIQUE: Thin section axial CT with IV contrast supplemented with multiplanar 3 D reconstructions of the head and neck. This study was performed with techniques to keep radiation doses as low as reasonably achievable, (ALARA). Individualized dose reduction techniques using automated exposure control or adjustment of  mA and/or kV according to the patient size were employed.  FINDINGS:  Head CT: The ventricles are moderately enlarged indicating atrophy appropriate for age. There is contrast in the transverse, straight and sagittal sinuses from contrast exams performed less than 2 hours prior. Some contrast persists in the basilar and vertebral arteries as well. There is no evidence of hemorrhage. No masses are identified.  No extra-axial fluid is seen. The sinuses are unremarkable.  CTA:  Aortic arch:  Arch shows no significant narrowing. Great vessel origins are widely patent. Lung apices demonstrate peripheral groundglass opacities suggesting pneumonia, possibly viral. This was demonstrated on the CT chest. Mild emphysematous change noted. Right carotid: There is mild calcified plaque in the right common carotid artery. In right bulb there is mild calcified plaque. This causes less than 50% stenosis.  Left carotid: There is mild calcified plaque in the left carotid bulb causing less than 50% stenosis.  Vertebrals: The vertebrals are patent. No significant stenosis is present. System is left vertebral dominant.  The cranial circulation is unremarkable. There is no significant stenosis, aneurysm, or occlusion.      Less than 50% stenosis bilateral internal carotid arteries.  Normal intracranial circulation.  Peripheral bilateral pneumonia, possibly viral.    CTDI: 32.17 mGy DLP:601.33 mGy.cm   This report was signed and finalized on 8/4/2024 1:50 PM by Rebecca Godwin MD.      CT Angiogram Neck    Result Date: 8/4/2024  PROCEDURE: CT ANGIOGRAM HEAD-, CT ANGIOGRAM NECK-  HISTORY: acute onset aphasia; R31.9-Hematuria, unspecified; I48.91-Unspecified atrial fibrillation; J18.9-Pneumonia, unspecified organism; R47.01-Aphasia; N39.0-Urinary tract infection, site not specified; R31.9-Hematuria, unspecified  TECHNIQUE: Thin section axial CT with IV contrast supplemented with multiplanar 3 D reconstructions of the head and neck. This study  was performed with techniques to keep radiation doses as low as reasonably achievable, (ALARA). Individualized dose reduction techniques using automated exposure control or adjustment of mA and/or kV according to the patient size were employed.  FINDINGS:  Head CT: The ventricles are moderately enlarged indicating atrophy appropriate for age. There is contrast in the transverse, straight and sagittal sinuses from contrast exams performed less than 2 hours prior. Some contrast persists in the basilar and vertebral arteries as well. There is no evidence of hemorrhage. No masses are identified.  No extra-axial fluid is seen. The sinuses are unremarkable.  CTA:  Aortic arch:  Arch shows no significant narrowing. Great vessel origins are widely patent. Lung apices demonstrate peripheral groundglass opacities suggesting pneumonia, possibly viral. This was demonstrated on the CT chest. Mild emphysematous change noted. Right carotid: There is mild calcified plaque in the right common carotid artery. In right bulb there is mild calcified plaque. This causes less than 50% stenosis.  Left carotid: There is mild calcified plaque in the left carotid bulb causing less than 50% stenosis.  Vertebrals: The vertebrals are patent. No significant stenosis is present. System is left vertebral dominant.  The cranial circulation is unremarkable. There is no significant stenosis, aneurysm, or occlusion.      Less than 50% stenosis bilateral internal carotid arteries.  Normal intracranial circulation.  Peripheral bilateral pneumonia, possibly viral.    CTDI: 32.17 mGy DLP:601.33 mGy.cm   This report was signed and finalized on 8/4/2024 1:50 PM by Rebecca Godwin MD.      CT Abdomen Pelvis With Contrast    Result Date: 8/4/2024  PROCEDURE: CT ABDOMEN PELVIS W CONTRAST-  HISTORY: recent nephrostomy, hematuria, difficulty urinating rule out stone  COMPARISON: 06/11/2024.  PROCEDURE: The patient was injected with IV contrast. Oral contrast was  administered. Axial images were obtained from the lung bases to the pubic symphysis by computed tomography. This study was performed with techniques to keep radiation doses as low as reasonably achievable, (ALARA). Individualized dose reduction techniques using automated exposure control or adjustment of mA and/or kV according to the patient size were employed.  FINDINGS:  ABDOMEN: The lung bases are clear. The heart is proper size. The liver is homogenous with no focal abnormality. Gallbladder present with no CT visible stones. The spleen is unremarkable. No adrenal mass is present. The pancreas demonstrates no definite abnormality but sensitivity significantly decreased from motion and streaking artifact. The kidneys demonstrate bilateral hydronephrosis which is stable on the left despite interval placement of a ureteral stent. Stent appears to be in good position with the proximal pigtail in the left renal pelvis and the distal pigtail extending through the focal bladder wall thickening/mass into the right side of the bladder. Hydronephrosis on the right is worsened from prior exam. Hydronephrosis appears to be secondary to the enhancing nodular thickening of the posterior wall of the bladder; some enhancing nodularity extends into the distal right ureter consistent with mass and suspected cause of the hydronephrosis. Prostate is normal in size with fiducial markers. There is small amount of presacral fluid/infiltration that is similar to prior exam. There is moderate bilateral perirenal stranding improved bilaterally. The aorta is again noted to be ectatic and dilated with vascular calcifications and mural thrombus/plaque. Maximum AP diameter is 48 mm, stable from prior. Aneurysm begins just distal to the renal arteries and extends to the bifurcation with dilatation of the common iliac arteries as well. Appearance is stable from prior. There is no free fluid or adenopathy. Streak artifact from patient's arm  position noted. There is motion artifact on some images as well.  PELVIS: The GI tract demonstrates no obstruction.  The appendix is not definitely identified but could be missed secondary to motion/streak artifact. The urinary bladder is unremarkable. There is no free fluid, adenopathy, or inflammatory process.      Persistent, moderate left hydronephrosis despite ureteral stent placement.  Worsened right moderate hydronephrosis which appears to be secondary to a right posterior bladder wall mass with some extension of the enhancing mass into the distal right ureter.  Stable appearance of the infrarenal abdominal aortic aneurysm with extension into the common iliac arteries.  CTDI: 8.21 mGy DLP:737.75 mGy.cm  This report was signed and finalized on 8/4/2024 1:07 PM by Rebecca Godwin MD.      CT Angiogram Chest Pulmonary Embolism    Result Date: 8/4/2024  PROCEDURE: CT ANGIOGRAM CHEST PULMONARY EMBOLISM-  HISTORY: tachycardia, rule out PE not on anticoagulation hx of afib, history of stage IIIa adenocarcinoma of the right lung.  COMPARISON: June 11, 2024.  TECHNIQUE: The patient was injected with  IV contrast. Axial images were obtained through the chest in a CTA/ PE protocol. 3 D Reconstruction images were also performed. This study was performed with techniques to keep radiation doses as low as reasonably achievable, (ALARA). Individualized dose reduction techniques using automated exposure control or adjustment of mA and/or kV according to the patient size were employed.  FINDINGS: There is no axillary adenopathy. There is no hilar or mediastinal adenopathy.  The heart is proper size. There is no pericardial or pleural effusion. There is mild emphysematous change. No filling defects are identified to suggest PE. There is minimal dilatation of the ascending aorta up to 41 mm, stable from prior. Cannot exclude dissection secondary to timing of the contrast bolus.. Limited images of the upper abdomen demonstrate  partial visualization of the gallbladder which appears normal. The residual mass/central right upper lobe airspace disease is stable from the prior exam. Multiple air bronchograms noted in this region, stable. There are new, faint, peripheral groundglass opacities suggesting pneumonia, possibly viral. Vascular calcifications noted. Streak artifact from patient's arm position noted.      No evidence of pulmonary embolism.  Suspected pneumonia, possibly viral.   CTDI: 8.21 mGy DLP:737.75 mGy.cm  This report was signed and finalized on 8/4/2024 12:11 PM by Rebecca Godwin MD.      XR Chest 1 View    Result Date: 8/4/2024  PROCEDURE: XR CHEST 1 VW-  HISTORY: Weak/Dizzy/AMS triage protocol, penile bleeding for 4 to 5 months.  COMPARISON: June 11, 2024.  FINDINGS: The heart is normal in size. Left lung is clear. There is right perihilar mass/airspace disease and linear densities which are stable from prior exam.. The mediastinum is unremarkable. There is no pneumothorax.  There are no acute osseous abnormalities. Tortuosity of the thoracic aorta noted. Apical lordotic positioning noted. Aortic mural calcifications noted.      Stable chest..     This report was signed and finalized on 8/4/2024 10:15 AM by Rebecca Godwin MD.       Results for orders placed in visit on 04/11/12    SCANNED VASCULAR STUDIES      Results for orders placed in visit on 04/11/12    SCANNED VASCULAR STUDIES      Results for orders placed during the hospital encounter of 08/04/24    Adult Transthoracic Echo Complete W/ Cont if Necessary Per Protocol    Interpretation Summary  •  Left ventricular systolic function is low normal. Calculated left ventricular EF = 47.9% Left ventricular ejection fraction appears to be 46 - 50%.  •  Saline test results are negative.  Normal bubble study.  •  Moderate mitral valve regurgitation is present.  •  Biatrial enlargement noted.  Correlate with any other signs of amyloidosis.  •  The right ventricular cavity  moderately is dilated.      Plan for Follow-up of Pending Labs/Results:     Discharge Details        Discharge Medications        New Medications        Instructions Start Date   Aspirin Low Dose 81 MG EC tablet  Generic drug: aspirin   81 mg, Oral, Daily   Start Date: August 12, 2024     atorvastatin 40 MG tablet  Commonly known as: LIPITOR   40 mg, Oral, Nightly             Changes to Medications        Instructions Start Date   metoprolol tartrate 25 MG tablet  Commonly known as: LOPRESSOR  What changed: when to take this   Take 0.5 (one-half) tablet by mouth Daily for 30 days.             Continue These Medications        Instructions Start Date   acetaminophen 650 MG 8 hr tablet  Commonly known as: TYLENOL   650 mg, Oral, Every 6 Hours PRN      Cholecalciferol 125 MCG (5000 UT) tablet   1 tablet, Oral, Daily      cloNIDine 0.1 MG tablet  Commonly known as: CATAPRES   0.1 mg, Oral, Every 6 Hours PRN      levothyroxine 50 MCG tablet  Commonly known as: Synthroid   50 mcg, Oral, Daily      oxybutynin XL 5 MG 24 hr tablet  Commonly known as: DITROPAN-XL   10 mg, Oral, Daily      tamsulosin 0.4 MG capsule 24 hr capsule  Commonly known as: FLOMAX   0.4 mg, Oral, Daily      vitamin B-12 1000 MCG tablet  Commonly known as: CYANOCOBALAMIN   1,000 mcg, Oral, Daily             Stop These Medications      sulfamethoxazole-trimethoprim 800-160 MG per tablet  Commonly known as: Bactrim DS              Allergies   Allergen Reactions   • Quetiapine Unknown - Low Severity   • Lisinopril Cough         Discharge Disposition:  Home or Self Care    Diet:  Hospital:  Diet Order   Procedures   • Diet: Cardiac, Diabetic; Healthy Heart (2-3 Na+); Consistent Carbohydrate; Fluid Consistency: Thin (IDDSI 0)            Activity:      Restrictions or Other Recommendations:       CODE STATUS:    Code Status and Medical Interventions: CPR (Attempt to Resuscitate); Full Support   Ordered at: 08/04/24 9381     Level Of Support Discussed With:     Patient     Code Status (Patient has no pulse and is not breathing):    CPR (Attempt to Resuscitate)     Medical Interventions (Patient has pulse or is breathing):    Full Support       Future Appointments   Date Time Provider Department Center   8/29/2024  1:00 PM Juventino Abdalla MD [facility]   9/25/2024  8:30 AM Mirella Mirza APRN [facility]       Additional Instructions for the Follow-ups that You Need to Schedule       Ambulatory Referral to Home Health   As directed      Face to Face Visit Date: 8/8/2024   Follow-up provider for Plan of Care?: I treated the patient in an acute care facility and will not continue treatment after discharge.   Follow-up provider: JUVENTINO ABDALLA [[id number]]   Reason/Clinical Findings: hematuria   Describe mobility limitations that make leaving home difficult: impaired functional mobility, balance, gait and endurance   Nursing/Therapeutic Services Requested: Skilled Nursing Physical Therapy Occupational Therapy   Skilled nursing orders: Other (resume home health)   PT orders: Other (add comment) (resume home health)   Occupational orders: Other (resume home health)   Frequency: 1 Week 1        Discharge Follow-up with PCP   As directed       Currently Documented PCP:    Juventino Abdalla MD    PCP Phone Number:    [phone]     Follow Up Details: 1 week        Discharge Follow-up with Specified Provider: Dr Tutu martin; 2 Weeks   As directed      To: Dr Tutu martin   Follow Up: 2 Weeks                      Delmi Zavaleta DO  08/11/24      Time Spent on Discharge:  I spent  36  minutes on this discharge activity which included: face-to-face encounter with the patient, reviewing the data in the system, coordination of the care with the nursing staff as well as consultants, documentation, and entering orders.
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
## 2024-08-11 NOTE — PROGRESS NOTES
"Daily Progress Note    Patient: Stephane MORENO Hasmukh  [facility] Day: 7    Subjective: Awake and alert.  No complaints.  Guaman was removed this morning (?).  He has voided some since then and urine and has only a slight blood-tinge.      Objective:     /66   Pulse 104   Temp 98.2 °F (36.8 °C) (Oral)   Resp 16   Ht 182.9 cm (72.01\")   Wt 71.3 kg (157 lb 3 oz)   SpO2 96%   BMI 21.31 kg/m²       Intake/Output Summary (Last 24 hours) at 8/11/2024 1106  Last data filed at 8/10/2024 2100  Gross per 24 hour   Intake --   Output 100 ml   Net -100 ml       Review of Systems:    Physical Exam:   General Appearance: alert, appears stated age and cooperative  Head: normocephalic, without obvious abnormality and atraumatic  Lungs respirations regular  Abdomen no masses and soft non tender  Male Genitalia circumcised    Lab Results (last 24 hours)       Procedure Component Value Units Date/Time    POC Glucose Once [[id number]]  (Normal) Collected: 08/11/24 0735    Specimen: Blood Updated: 08/11/24 0736     Glucose 97 mg/dL     Renal Function Panel [[id number]]  (Abnormal) Collected: 08/11/24 0619    Specimen: Blood Updated: 08/11/24 0718     Glucose 83 mg/dL      BUN 21 mg/dL      Creatinine 1.14 mg/dL      Sodium 140 mmol/L      Potassium 3.4 mmol/L      Chloride 108 mmol/L      CO2 26.0 mmol/L      Calcium 8.4 mg/dL      Albumin 2.5 g/dL      Phosphorus 2.8 mg/dL      Anion Gap 6.0 mmol/L      BUN/Creatinine Ratio 18.4     eGFR 67.5 mL/min/1.73     Narrative:      GFR Normal >60  Chronic Kidney Disease <60  Kidney Failure <15    The GFR formula is only valid for adults with stable renal function between ages 18 and 70.    CBC & Differential [[id number]]  (Abnormal) Collected: 08/11/24 0619    Specimen: Blood Updated: 08/11/24 0714    Narrative:      The following orders were created for panel order CBC & Differential.  Procedure                               Abnormality         Status                     ---------           "                     -----------         ------                     CBC Auto Differential[[id number]]        Abnormal            Final result               Scan Slide[[id number]]                                                                    Please view results for these tests on the individual orders.    CBC Auto Differential [[id number]]  (Abnormal) Collected: 08/11/24 0619    Specimen: Blood Updated: 08/11/24 0714     WBC 8.63 10*3/mm3      RBC 2.30 10*6/mm3      Hemoglobin 7.5 g/dL      Hematocrit 23.9 %      .9 fL      MCH 32.6 pg      MCHC 31.4 g/dL      RDW 18.1 %      RDW-SD 68.6 fl      MPV 9.7 fL      Platelets 239 10*3/mm3      Neutrophil % 77.0 %      Lymphocyte % 13.8 %      Monocyte % 6.3 %      Eosinophil % 1.6 %      Basophil % 0.7 %      Immature Grans % 0.6 %      Neutrophils, Absolute 6.65 10*3/mm3      Lymphocytes, Absolute 1.19 10*3/mm3      Monocytes, Absolute 0.54 10*3/mm3      Eosinophils, Absolute 0.14 10*3/mm3      Basophils, Absolute 0.06 10*3/mm3      Immature Grans, Absolute 0.05 10*3/mm3      nRBC 0.0 /100 WBC     POC Glucose Once [[id number]]  (Abnormal) Collected: 08/10/24 2025    Specimen: Blood Updated: 08/10/24 2026     Glucose 149 mg/dL     POC Glucose Once [[id number]]  (Normal) Collected: 08/10/24 1634    Specimen: Blood Updated: 08/10/24 1636     Glucose 119 mg/dL     POC Glucose Once [[id number]]  (Abnormal) Collected: 08/10/24 1129    Specimen: Blood Updated: 08/10/24 1131     Glucose 135 mg/dL     CBC & Differential [[id number]]  (Abnormal) Collected: 08/10/24 1030    Specimen: Blood Updated: 08/10/24 1127    Narrative:      The following orders were created for panel order CBC & Differential.  Procedure                               Abnormality         Status                     ---------                               -----------         ------                     CBC Auto Differential[[id number]]        Abnormal            Final result               Scan Slide[[id number]]                                        Final result                 Please view results for these tests on the individual orders.    CBC Auto Differential [[id number]]  (Abnormal) Collected: 08/10/24 1030    Specimen: Blood Updated: 08/10/24 1127     WBC 8.11 10*3/mm3      RBC 2.20 10*6/mm3      Hemoglobin 7.2 g/dL      Hematocrit 23.0 %      .5 fL      MCH 32.7 pg      MCHC 31.3 g/dL      RDW 18.5 %      RDW-SD 70.8 fl      MPV 10.0 fL      Platelets 211 10*3/mm3      Neutrophil % 75.2 %      Lymphocyte % 14.1 %      Monocyte % 7.4 %      Eosinophil % 1.8 %      Basophil % 0.9 %      Immature Grans % 0.6 %      Neutrophils, Absolute 6.10 10*3/mm3      Lymphocytes, Absolute 1.14 10*3/mm3      Monocytes, Absolute 0.60 10*3/mm3      Eosinophils, Absolute 0.15 10*3/mm3      Basophils, Absolute 0.07 10*3/mm3      Immature Grans, Absolute 0.05 10*3/mm3      nRBC 0.0 /100 WBC     Narrative:      Appended report. These results have been appended to a previously verified report.    Scan Slide [[id number]] Collected: 08/10/24 1030    Specimen: Blood Updated: 08/10/24 1127     Anisocytosis Slight/1+     Dacrocytes Slight/1+     Macrocytes Slight/1+     WBC Morphology Normal     Platelet Morphology Normal    Basic Metabolic Panel [[id number]]  (Abnormal) Collected: 08/10/24 1030    Specimen: Blood Updated: 08/10/24 1117     Glucose 110 mg/dL      BUN 20 mg/dL      Creatinine 1.15 mg/dL      Sodium 141 mmol/L      Potassium 3.6 mmol/L      Chloride 109 mmol/L      CO2 26.0 mmol/L      Calcium 8.7 mg/dL      BUN/Creatinine Ratio 17.4     Anion Gap 6.0 mmol/L      eGFR 66.8 mL/min/1.73     Narrative:      GFR Normal >60  Chronic Kidney Disease <60  Kidney Failure <15    The GFR formula is only valid for adults with stable renal function between ages 18 and 70.          Assessment/Plan: Postop day #3 from cystoscopy with clot evacuation and fulguration of the bladder.  He is voiding some after catheter removal.  Home anytime  from  standpoint.          Gross hematuria    Atrial fibrillation with rapid ventricular response    Type 2 diabetes mellitus with diabetic polyneuropathy, without long-term current use of insulin    Primary hypertension    CAD (coronary artery disease)    Cigarette smoker    Anemia    History of laminectomy    Physical deconditioning    History of oropharyngeal cancer    History of prostate cancer    History of lung cancer    Transient ischemic attack (TIA)    Bladder mass    Acute UTI (urinary tract infection)    Bilateral pneumonia    Abdominal aortic aneurysm (AAA) without rupture        Diagnosis Plan   1. Transient ischemic attack (TIA)  Ambulatory Referral to Home Health      2. Neurological deficit, transient  Ambulatory Referral to Neurology      3. Cognitive communication deficit              Osmin Cross MD - 8/11/2024, 11:06 EDT

## 2024-08-11 NOTE — NURSING NOTE
AXO4; RA; A-FIB; NO COMPLAINTS OF PAIN.    BELONGINGS SENT WITH PT. TELE REMOVED, CLEANED, AND PLACED IN PROPER SPOT.    DISCHARGE TEACHING COMPLETED AND PT HAD NO CONCERNS OR QUESTIONS.

## 2024-08-11 NOTE — PLAN OF CARE
Goal Outcome Evaluation:  Plan of Care Reviewed With: patient        Progress: improving  Outcome Evaluation: Pt amb in hallway [phone] with RW and supervision. Pt had no LOB and dem good safety awareness. Increased HR and a-fib noted throughout activity, however pt denied SOA, dizziness, or discomfort. Good standing and sitting balance noted during ADL's. PT ok with d/c home with HHPT.      Anticipated Discharge Disposition (PT): home with home health

## 2024-08-12 NOTE — OUTREACH NOTE
Prep Survey      Flowsheet Row Responses   Denominational facility patient discharged from? [facility]   Is LACE score < 7 ? No   Eligibility Readm Mgmt   Discharge diagnosis Gross hematuria,  CYSTOSCOPY BLADDER CLOT EVACUATION WITH FULGURATION   Does the patient have one of the following disease processes/diagnoses(primary or secondary)? Other   Does the patient have Home health ordered? Yes   What is the Home health agency?  [facility]    Is there a DME ordered? No   Prep survey completed? Yes            Ml CASTRO - Registered Nurse

## 2024-08-13 ENCOUNTER — TELEPHONE (OUTPATIENT)
Dept: FAMILY MEDICINE CLINIC | Facility: CLINIC | Age: 74
End: 2024-08-13
Payer: MEDICARE

## 2024-08-13 NOTE — TELEPHONE ENCOUNTER
Phone conversation with home health nurse regarding this patient who is just sent home from [facility] after being transferred from [facility] after having developed symptoms of aphasia and associate with A-fib and RVR with hypotension.  Also noted to have a new bladder mass with hematuria during hospitalization undergoing cystoscopy with evacuation of clots and hemostasis of the bladder lesion.  His aphasia apparently resolved given NIH stroke scale being 0, and he was ultimately discharged home in stable condition.  Home health nurse indicates that he seems comfortable and his saturation is now under 88%, apparently having had normal saturation upon time of discharge patient's baseline pulse oximetry being in the mid to upper 90s despite a history of COPD with ongoing steroid abuse..  She does indicate that his hands are cold and she is not sure if this is a truly accurate pulse oximetry.  She asked for advice regarding current management as well as whether or not he should finish up previous prescription of Bactrim prescribed prior to his hospitalization.  I advised nurse that if he truly has had development of hypoxia then he needs to be formally evaluated in an ER setting.  In the interim, however she will try to warm up his hands to get a more accurate pulse oximetry.  He did have a positive Legionella urine antigen screen with CTA of the chest revealing no clear evidence of an active pneumonia, and certainly if he is stable I will treat him with a Z-Dagoberto given his underlying COPD.  Again if not stable then he is to go to the ER nurse will advise accordingly.

## 2024-08-14 ENCOUNTER — READMISSION MANAGEMENT (OUTPATIENT)
Dept: CALL CENTER | Facility: HOSPITAL | Age: 74
End: 2024-08-14
Payer: MEDICARE

## 2024-08-14 NOTE — OUTREACH NOTE
Medical Week 1 Survey      Flowsheet Row Responses   [facility] patient discharged from? [place]   Does the patient have one of the following disease processes/diagnoses(primary or secondary)? Other   Week 1 attempt successful? No   Unsuccessful attempts Attempt 1            Gabriela FENTON - Registered Nurse

## 2024-08-21 ENCOUNTER — READMISSION MANAGEMENT (OUTPATIENT)
Dept: CALL CENTER | Facility: HOSPITAL | Age: 74
End: 2024-08-21
Payer: MEDICARE

## 2024-08-21 NOTE — OUTREACH NOTE
Medical Week 2 Survey      Flowsheet Row Responses   [facility] patient discharged from? [place]   Does the patient have one of the following disease processes/diagnoses(primary or secondary)? Other   Week 2 attempt successful? No   Unsuccessful attempts Attempt 1            Home ANDERSON - Registered Nurse

## 2024-08-23 ENCOUNTER — TELEPHONE (OUTPATIENT)
Dept: FAMILY MEDICINE CLINIC | Facility: CLINIC | Age: 74
End: 2024-08-23
Payer: MEDICARE

## 2024-08-23 NOTE — TELEPHONE ENCOUNTER
Caller: JAS - [facility]    Relationship:     Best call back number: [phone]     What form or medical record are you requesting: DIAGNOSIS CODE FOR PRIMARY FOCUS OF CARE.    Who is requesting this form or medical record from you: JAS    How would you like to receive the form or medical records (pick-up, mail, fax): CALLBACK    PHONE # [phone]    Timeframe paperwork needed: ASAP    Additional notes: WHAT IS CAUSING THE HEMATURIA?

## 2024-08-27 NOTE — TELEPHONE ENCOUNTER
----- Message from Nain Morel sent at 8/26/2024  8:13 PM EDT -----  Please advise patient to discuss his lab test results with the physician who ordered this testing.    I have spoke with him and have let him know this. TF

## 2024-08-28 NOTE — OUTREACH NOTE
Medical Week 3 Survey      Flowsheet Row Responses   [facility] patient discharged from? [facility]   Does the patient have one of the following disease processes/diagnoses(primary or secondary)? Other   Week 3 attempt successful? No   Unsuccessful attempts Attempt 1            Leslie CHARLES - Licensed Nurse

## 2024-08-30 ENCOUNTER — TELEPHONE (OUTPATIENT)
Dept: FAMILY MEDICINE CLINIC | Facility: CLINIC | Age: 74
End: 2024-08-30

## (undated) DEVICE — CANNULA,ADULT,SOFT-TOUCH,7'TUBE,UC: Brand: PENDING

## (undated) DEVICE — PATIENT RETURN ELECTRODE, SINGLE-USE, CONTACT QUALITY MONITORING, ADULT, WITH 9FT CORD, FOR PATIENTS WEIGING OVER 33LBS. (15KG): Brand: MEGADYNE

## (undated) DEVICE — PK CATH CARD 10

## (undated) DEVICE — COL SPUTUM SYS 50ML

## (undated) DEVICE — PK EXTREM LOWR 10

## (undated) DEVICE — TRAP FLD MINIVAC MEGADYNE 100ML

## (undated) DEVICE — PK CYSTO-TUR BASIC 10

## (undated) DEVICE — SOL IRR NACL 0.9PCT BT 1000ML

## (undated) DEVICE — GUIDE CATHETER: Brand: MACH1™

## (undated) DEVICE — MODEL AT P54, P/N 700608-035KIT CONTENTS: HAND CONTROLLER, 3-WAY HIGH-PRESSURE STOPCOCK WITH ROTATING END AND PREMIUM HIGH-PRESSURE TUBING: Brand: ANGIOTOUCH® KIT

## (undated) DEVICE — MODEL BT2000 P/N 700287-012KIT CONTENTS: MANIFOLD WITH SALINE AND CONTRAST PORTS, SALINE TUBING WITH SPIKE AND HAND SYRINGE, TRANSDUCER: Brand: BT2000 AUTOMATED MANIFOLD KIT

## (undated) DEVICE — ST NDL BRONCH ASP VIZISHOT 2 FLX 19GA

## (undated) DEVICE — DEV COMP RAD PRELUDESYNC 29CM

## (undated) DEVICE — KT VLV HEMO MAP ACC PLS LG/BORE MTL/INTRO W/TORQ/DEV

## (undated) DEVICE — DRSNG GZ PETROLTM XEROFORM CURAD 4X4IN STRL

## (undated) DEVICE — ADAPT SWVL FIBROPTIC BRONCH

## (undated) DEVICE — GLV SURG SENSICARE PI MIC PF SZ8 LF STRL

## (undated) DEVICE — BOWL UTIL STRL 32OZ

## (undated) DEVICE — DRAINBAG,ANTI-REFLUX TOWER,L/F,2000ML,LL: Brand: MEDLINE

## (undated) DEVICE — ANTIBACTERIAL UNDYED BRAIDED (POLYGLACTIN 910), SYNTHETIC ABSORBABLE SUTURE: Brand: COATED VICRYL

## (undated) DEVICE — CATH DIAG EXPO .056 FL3.5 6F 100CM

## (undated) DEVICE — FRCP BX RADJAW4 PULM WO NDL STD1.8X2 100

## (undated) DEVICE — Device: Brand: SINGLE USE ASPIRATION NEEDLE NA-U401SX

## (undated) DEVICE — Device

## (undated) DEVICE — SPNG GZ WOVN 4X4IN 12PLY 10/BX STRL

## (undated) DEVICE — TUBING,OXYGEN,CRUSH RES,7',CLEAR,UC: Brand: MEDLINE INDUSTRIES, INC.

## (undated) DEVICE — GLIDESHEATH SLENDER STAINLESS STEEL KIT: Brand: GLIDESHEATH SLENDER

## (undated) DEVICE — LUER-LOK 360°: Brand: CONNECTA, LUER-LOK

## (undated) DEVICE — BANDAGE,GAUZE,BULKEE II,4.5"X4.1YD,STRL: Brand: MEDLINE

## (undated) DEVICE — TBG PENCL TELESCP MEGADYNE SMOKE EVAC 10FT

## (undated) DEVICE — GW PRESS VERRATA STR 185CM

## (undated) DEVICE — 3M™ STERI-DRAPE™ ISOLATION BAG, 10 PER CARTON / 4 CARTONS PER CASE, 1003: Brand: 3M™ STERI-DRAPE™

## (undated) DEVICE — Device: Brand: BALLOON

## (undated) DEVICE — A2000 MULTI-USE SYRINGE KIT, P/N 701277-003KIT CONTENTS: 100ML CONTRAST RESERVOIR AND TUBING WITH CONTRAST SPIKE AND CLAMP: Brand: A2000 MULTI-USE SYRINGE KIT

## (undated) DEVICE — CONTAINER,SPECIMEN,OR STERILE,4OZ: Brand: MEDLINE

## (undated) DEVICE — TRAP,MUCUS SPECIMEN,40CC: Brand: MEDLINE

## (undated) DEVICE — 450 ML BOTTLE OF 0.05% CHLORHEXIDINE GLUCONATE IN 99.95% STERILE WATER FOR IRRIGATION, USP AND APPLICATOR.: Brand: IRRISEPT ANTIMICROBIAL WOUND LAVAGE

## (undated) DEVICE — ELECTRD BLD EZ CLN MOD XLNG 2.75IN

## (undated) DEVICE — SINGLE USE SUCTION VALVE MAJ-209: Brand: SINGLE USE SUCTION VALVE (STERILE)

## (undated) DEVICE — SINGLE USE BIOPSY VALVE MAJ-210: Brand: SINGLE USE BIOPSY VALVE (STERILE)

## (undated) DEVICE — GLV SURG PREMIERPRO MIC LTX PF SZ7 BRN

## (undated) DEVICE — ST EXT MICROBORE FIX M LL 38IN

## (undated) DEVICE — SUT ETHLN 2/0 PS 18IN 585H

## (undated) DEVICE — SUT ETHLN 3/0 FS1 30IN 669H

## (undated) DEVICE — CATH DIAG EXPO M/ PK 6FR FL4/FR4 PIG 3PK

## (undated) DEVICE — DRAPE,TOP,102X53,STERILE: Brand: MEDLINE

## (undated) DEVICE — EVAC BLDR UROVAC W ADAPT

## (undated) DEVICE — CATH FOLEY LUBRICATH 3WY 22F 30CC